# Patient Record
Sex: FEMALE | Employment: OTHER | URBAN - METROPOLITAN AREA
[De-identification: names, ages, dates, MRNs, and addresses within clinical notes are randomized per-mention and may not be internally consistent; named-entity substitution may affect disease eponyms.]

---

## 2018-10-21 ENCOUNTER — HOSPITAL ENCOUNTER (INPATIENT)
Age: 78
LOS: 3 days | Discharge: NURSING, REHAB OR LONG-TERM CARE | DRG: 536 | End: 2018-10-25
Attending: EMERGENCY MEDICINE | Admitting: FAMILY MEDICINE
Payer: MEDICARE
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
## 2018-10-21 DIAGNOSIS — S72.115A NONDISPLACED FRACTURE OF GREATER TROCHANTER OF LEFT FEMUR, INITIAL ENCOUNTER FOR CLOSED FRACTURE (HCC): Primary | ICD-10-CM

## 2018-10-21 PROCEDURE — 99284 EMERGENCY DEPT VISIT MOD MDM: CPT

## 2018-10-21 PROCEDURE — 96374 THER/PROPH/DIAG INJ IV PUSH: CPT

## 2018-10-21 PROCEDURE — 96376 TX/PRO/DX INJ SAME DRUG ADON: CPT

## 2018-10-21 RX ORDER — ASPIRIN 81 MG/1
81 TABLET ORAL DAILY
COMMUNITY
End: 2021-12-03

## 2018-10-22 ENCOUNTER — APPOINTMENT (OUTPATIENT)
Dept: GENERAL RADIOLOGY | Age: 78
DRG: 536 | End: 2018-10-22
Attending: FAMILY MEDICINE
Payer: MEDICARE

## 2018-10-22 ENCOUNTER — APPOINTMENT (OUTPATIENT)
Dept: GENERAL RADIOLOGY | Age: 78
DRG: 536 | End: 2018-10-22
Attending: NURSE PRACTITIONER
Payer: MEDICARE

## 2018-10-22 ENCOUNTER — APPOINTMENT (OUTPATIENT)
Dept: CT IMAGING | Age: 78
DRG: 536 | End: 2018-10-22
Attending: NURSE PRACTITIONER
Payer: MEDICARE

## 2018-10-22 PROBLEM — M25.552 ACUTE HIP PAIN, LEFT: Status: ACTIVE | Noted: 2018-10-22

## 2018-10-22 PROBLEM — S72.112A FRACTURE OF GREATER TROCHANTER OF LEFT FEMUR (HCC): Status: ACTIVE | Noted: 2018-10-22

## 2018-10-22 LAB
ABO + RH BLD: NORMAL
ALBUMIN SERPL-MCNC: 3.3 G/DL (ref 3.5–5)
ALBUMIN/GLOB SERPL: 1 {RATIO} (ref 1.1–2.2)
ALP SERPL-CCNC: 141 U/L (ref 45–117)
ALT SERPL-CCNC: 20 U/L (ref 12–78)
ANION GAP SERPL CALC-SCNC: 8 MMOL/L (ref 5–15)
APTT PPP: 26.9 SEC (ref 22.1–32)
AST SERPL-CCNC: 14 U/L (ref 15–37)
ATRIAL RATE: 96 BPM
BASOPHILS # BLD: 0.1 K/UL (ref 0–0.1)
BASOPHILS NFR BLD: 0 % (ref 0–1)
BILIRUB SERPL-MCNC: 0.3 MG/DL (ref 0.2–1)
BLOOD GROUP ANTIBODIES SERPL: NORMAL
BUN SERPL-MCNC: 17 MG/DL (ref 6–20)
BUN/CREAT SERPL: 16 (ref 12–20)
CALCIUM SERPL-MCNC: 8.5 MG/DL (ref 8.5–10.1)
CALCULATED P AXIS, ECG09: 35 DEGREES
CALCULATED R AXIS, ECG10: 26 DEGREES
CALCULATED T AXIS, ECG11: 34 DEGREES
CHLORIDE SERPL-SCNC: 109 MMOL/L (ref 97–108)
CK MB CFR SERPL CALC: 1.8 % (ref 0–2.5)
CK MB SERPL-MCNC: 1.4 NG/ML (ref 5–25)
CK SERPL-CCNC: 76 U/L (ref 26–192)
CO2 SERPL-SCNC: 25 MMOL/L (ref 21–32)
COMMENT, HOLDF: NORMAL
CREAT SERPL-MCNC: 1.07 MG/DL (ref 0.55–1.02)
DIAGNOSIS, 93000: NORMAL
DIFFERENTIAL METHOD BLD: ABNORMAL
EOSINOPHIL # BLD: 0.4 K/UL (ref 0–0.4)
EOSINOPHIL NFR BLD: 3 % (ref 0–7)
ERYTHROCYTE [DISTWIDTH] IN BLOOD BY AUTOMATED COUNT: 12.9 % (ref 11.5–14.5)
GLOBULIN SER CALC-MCNC: 3.4 G/DL (ref 2–4)
GLUCOSE SERPL-MCNC: 102 MG/DL (ref 65–100)
HCT VFR BLD AUTO: 36.2 % (ref 35–47)
HGB BLD-MCNC: 11.9 G/DL (ref 11.5–16)
IMM GRANULOCYTES # BLD: 0.1 K/UL (ref 0–0.04)
IMM GRANULOCYTES NFR BLD AUTO: 1 % (ref 0–0.5)
INR PPP: 1 (ref 0.9–1.1)
LYMPHOCYTES # BLD: 1.9 K/UL (ref 0.8–3.5)
LYMPHOCYTES NFR BLD: 13 % (ref 12–49)
MAGNESIUM SERPL-MCNC: 2.1 MG/DL (ref 1.6–2.4)
MCH RBC QN AUTO: 30.5 PG (ref 26–34)
MCHC RBC AUTO-ENTMCNC: 32.9 G/DL (ref 30–36.5)
MCV RBC AUTO: 92.8 FL (ref 80–99)
MONOCYTES # BLD: 1 K/UL (ref 0–1)
MONOCYTES NFR BLD: 7 % (ref 5–13)
NEUTS SEG # BLD: 11.1 K/UL (ref 1.8–8)
NEUTS SEG NFR BLD: 76 % (ref 32–75)
NRBC # BLD: 0 K/UL (ref 0–0.01)
NRBC BLD-RTO: 0 PER 100 WBC
P-R INTERVAL, ECG05: 152 MS
PLATELET # BLD AUTO: 302 K/UL (ref 150–400)
PMV BLD AUTO: 10 FL (ref 8.9–12.9)
POTASSIUM SERPL-SCNC: 3.9 MMOL/L (ref 3.5–5.1)
PROT SERPL-MCNC: 6.7 G/DL (ref 6.4–8.2)
PROTHROMBIN TIME: 9.8 SEC (ref 9–11.1)
Q-T INTERVAL, ECG07: 340 MS
QRS DURATION, ECG06: 72 MS
QTC CALCULATION (BEZET), ECG08: 429 MS
RBC # BLD AUTO: 3.9 M/UL (ref 3.8–5.2)
SAMPLES BEING HELD,HOLD: NORMAL
SODIUM SERPL-SCNC: 142 MMOL/L (ref 136–145)
SPECIMEN EXP DATE BLD: NORMAL
THERAPEUTIC RANGE,PTTT: NORMAL SECS (ref 58–77)
TROPONIN I SERPL-MCNC: <0.05 NG/ML
VENTRICULAR RATE, ECG03: 96 BPM
WBC # BLD AUTO: 14.6 K/UL (ref 3.6–11)

## 2018-10-22 PROCEDURE — 84484 ASSAY OF TROPONIN QUANT: CPT | Performed by: FAMILY MEDICINE

## 2018-10-22 PROCEDURE — 85025 COMPLETE CBC W/AUTO DIFF WBC: CPT | Performed by: FAMILY MEDICINE

## 2018-10-22 PROCEDURE — 97165 OT EVAL LOW COMPLEX 30 MIN: CPT | Performed by: OCCUPATIONAL THERAPIST

## 2018-10-22 PROCEDURE — 74011250636 HC RX REV CODE- 250/636: Performed by: NURSE PRACTITIONER

## 2018-10-22 PROCEDURE — 73700 CT LOWER EXTREMITY W/O DYE: CPT

## 2018-10-22 PROCEDURE — 93005 ELECTROCARDIOGRAM TRACING: CPT

## 2018-10-22 PROCEDURE — 71045 X-RAY EXAM CHEST 1 VIEW: CPT

## 2018-10-22 PROCEDURE — 86900 BLOOD TYPING SEROLOGIC ABO: CPT | Performed by: FAMILY MEDICINE

## 2018-10-22 PROCEDURE — 77030036660

## 2018-10-22 PROCEDURE — 97116 GAIT TRAINING THERAPY: CPT

## 2018-10-22 PROCEDURE — 74011250636 HC RX REV CODE- 250/636

## 2018-10-22 PROCEDURE — 82550 ASSAY OF CK (CPK): CPT | Performed by: FAMILY MEDICINE

## 2018-10-22 PROCEDURE — 83735 ASSAY OF MAGNESIUM: CPT | Performed by: FAMILY MEDICINE

## 2018-10-22 PROCEDURE — 85730 THROMBOPLASTIN TIME PARTIAL: CPT | Performed by: FAMILY MEDICINE

## 2018-10-22 PROCEDURE — 73552 X-RAY EXAM OF FEMUR 2/>: CPT

## 2018-10-22 PROCEDURE — 74011250637 HC RX REV CODE- 250/637: Performed by: PHYSICIAN ASSISTANT

## 2018-10-22 PROCEDURE — 36415 COLL VENOUS BLD VENIPUNCTURE: CPT | Performed by: NURSE PRACTITIONER

## 2018-10-22 PROCEDURE — 80053 COMPREHEN METABOLIC PANEL: CPT | Performed by: FAMILY MEDICINE

## 2018-10-22 PROCEDURE — 85610 PROTHROMBIN TIME: CPT | Performed by: FAMILY MEDICINE

## 2018-10-22 PROCEDURE — 97535 SELF CARE MNGMENT TRAINING: CPT | Performed by: OCCUPATIONAL THERAPIST

## 2018-10-22 PROCEDURE — 74011250637 HC RX REV CODE- 250/637: Performed by: FAMILY MEDICINE

## 2018-10-22 PROCEDURE — 73502 X-RAY EXAM HIP UNI 2-3 VIEWS: CPT

## 2018-10-22 PROCEDURE — 97161 PT EVAL LOW COMPLEX 20 MIN: CPT

## 2018-10-22 PROCEDURE — 74011250636 HC RX REV CODE- 250/636: Performed by: FAMILY MEDICINE

## 2018-10-22 PROCEDURE — 73590 X-RAY EXAM OF LOWER LEG: CPT

## 2018-10-22 PROCEDURE — 65270000029 HC RM PRIVATE

## 2018-10-22 RX ORDER — FENTANYL CITRATE 50 UG/ML
25 INJECTION, SOLUTION INTRAMUSCULAR; INTRAVENOUS
Status: COMPLETED | OUTPATIENT
Start: 2018-10-22 | End: 2018-10-22

## 2018-10-22 RX ORDER — SODIUM CHLORIDE 0.9 % (FLUSH) 0.9 %
5-10 SYRINGE (ML) INJECTION EVERY 8 HOURS
Status: DISCONTINUED | OUTPATIENT
Start: 2018-10-22 | End: 2018-10-25 | Stop reason: HOSPADM

## 2018-10-22 RX ORDER — ACETAMINOPHEN 325 MG/1
650 TABLET ORAL
Status: DISCONTINUED | OUTPATIENT
Start: 2018-10-22 | End: 2018-10-25 | Stop reason: HOSPADM

## 2018-10-22 RX ORDER — PANTOPRAZOLE SODIUM 40 MG/1
40 TABLET, DELAYED RELEASE ORAL
Status: DISCONTINUED | OUTPATIENT
Start: 2018-10-22 | End: 2018-10-25 | Stop reason: HOSPADM

## 2018-10-22 RX ORDER — FENTANYL CITRATE 50 UG/ML
25 INJECTION, SOLUTION INTRAMUSCULAR; INTRAVENOUS
Status: DISCONTINUED | OUTPATIENT
Start: 2018-10-22 | End: 2018-10-25 | Stop reason: HOSPADM

## 2018-10-22 RX ORDER — HYDROCODONE BITARTRATE AND ACETAMINOPHEN 5; 325 MG/1; MG/1
1 TABLET ORAL
Status: DISCONTINUED | OUTPATIENT
Start: 2018-10-22 | End: 2018-10-25 | Stop reason: HOSPADM

## 2018-10-22 RX ORDER — SODIUM CHLORIDE 0.9 % (FLUSH) 0.9 %
5-10 SYRINGE (ML) INJECTION AS NEEDED
Status: DISCONTINUED | OUTPATIENT
Start: 2018-10-22 | End: 2018-10-25 | Stop reason: HOSPADM

## 2018-10-22 RX ORDER — FENTANYL CITRATE 50 UG/ML
INJECTION, SOLUTION INTRAMUSCULAR; INTRAVENOUS
Status: DISPENSED
Start: 2018-10-22 | End: 2018-10-22

## 2018-10-22 RX ORDER — BUPROPION HYDROCHLORIDE 150 MG/1
150 TABLET, EXTENDED RELEASE ORAL DAILY
Status: DISCONTINUED | OUTPATIENT
Start: 2018-10-22 | End: 2018-10-25 | Stop reason: HOSPADM

## 2018-10-22 RX ORDER — LANSOPRAZOLE 15 MG/1
15 TABLET, ORALLY DISINTEGRATING, DELAYED RELEASE ORAL
Status: DISCONTINUED | OUTPATIENT
Start: 2018-10-22 | End: 2018-10-22 | Stop reason: CLARIF

## 2018-10-22 RX ADMIN — Medication 10 ML: at 05:10

## 2018-10-22 RX ADMIN — HYDROCODONE BITARTRATE AND ACETAMINOPHEN 1 TABLET: 5; 325 TABLET ORAL at 09:51

## 2018-10-22 RX ADMIN — FENTANYL CITRATE 25 MCG: 50 INJECTION, SOLUTION INTRAMUSCULAR; INTRAVENOUS at 01:16

## 2018-10-22 RX ADMIN — BUPROPION HYDROCHLORIDE 150 MG: 150 TABLET, EXTENDED RELEASE ORAL at 09:51

## 2018-10-22 RX ADMIN — HYDROCODONE BITARTRATE AND ACETAMINOPHEN 1 TABLET: 5; 325 TABLET ORAL at 22:22

## 2018-10-22 RX ADMIN — ACETAMINOPHEN 650 MG: 325 TABLET, FILM COATED ORAL at 07:46

## 2018-10-22 RX ADMIN — PANTOPRAZOLE SODIUM 40 MG: 40 TABLET, DELAYED RELEASE ORAL at 07:30

## 2018-10-22 RX ADMIN — HYDROCODONE BITARTRATE AND ACETAMINOPHEN 1 TABLET: 5; 325 TABLET ORAL at 18:14

## 2018-10-22 RX ADMIN — FENTANYL CITRATE 25 MCG: 50 INJECTION, SOLUTION INTRAMUSCULAR; INTRAVENOUS at 03:01

## 2018-10-22 RX ADMIN — FENTANYL CITRATE 25 MCG: 50 INJECTION, SOLUTION INTRAMUSCULAR; INTRAVENOUS at 05:10

## 2018-10-22 RX ADMIN — Medication 5 ML: at 22:00

## 2018-10-22 RX ADMIN — Medication 10 ML: at 13:15

## 2018-10-22 RX ADMIN — HYDROCODONE BITARTRATE AND ACETAMINOPHEN 1 TABLET: 5; 325 TABLET ORAL at 13:45

## 2018-10-22 NOTE — H&P
[address]  HISTORY AND PHYSICAL Brigitte Lira MR#: [id number] : 1940 ACCOUNT #: [de-identified] ADMIT DATE: 10/21/2018 CHIEF COMPLAINT:  Left hip pain. HISTORY OF PRESENT ILLNESS:  A 81-year-old white female with past medical history of GERD, hiatal hernia who presented to the emergency department with chief complaint of left hip pain status post fall. The patient reportedly was outside of the [facility] [address] [facility] [address] where she tripped and fell over a construction sign or object, falling to the ground. She subsequently had left hip pain which was severe to the point where she was unable to bear any weight. Pain remained constant, aching, 10/10, involving the left hip, aggravated with any movement or palpation, or weightbearing or touch. The pain with mostly located around the left hip and radiating towards her left buttock, left thigh region. On arrival to the emergency department, initial recorded vital signs of blood pressure 154/80, heart rate 94, respiratory rate 18, O2 saturation 98% room air. Workup included CT of the left lower extremity without contrast shows subtle nondisplaced fracture of the left greater trochanter. Orthopedic surgery service was consulted. The patient is now seen for admission to the hospitalist service. The patient was given fentanyl 25 mcg IV x2 doses for pain. She does not report hitting her head. She does not report any loss of consciousness. There are no reports of dizziness, lightheadedness, syncope, new onset numbness, paresthesias, slurred speech, facial droop, visual disturbance, headache, neck pain, back pain, chest pain, shortness of breath, cough, congestion, abdominal pain, nausea, vomiting, diarrhea, melena, dysuria, hematuria, calf pain, swelling, edema, fever, chills, or rash. PAST MEDICAL HISTORY:   
1. GERD. 2.  Hiatal hernia. 3.  Depression -- question on Wellbutrin. MEDICATIONS:  Complete medication list reviewed and noted on chart records. Taking Last Dose Start Date End Date Provider   
 aspirin delayed-release 81 mg tablet    --  -- Yesenia Noonan MD  
 Take 81 mg by mouth daily. buPROPion SR (WELLBUTRIN SR) 150 mg SR tablet    03/29/11  --  Provider, Historical  
 Take 150 mg by mouth daily. Notes:  Patient instructed to take pre-op day of surgery with sip of water per anesthesia guidelines. lansoprazole (PREVACID) 15 mg disintegrating tablet    --  --  Provider, Historical  
 Take 15 mg by mouth daily (before breakfast). Notes:  Patient instructed to take pre-op day of surgery with sip of water per anesthesia guidelines. ALLERGIES:  NO KNOWN DRUG ALLERGIES. SOCIAL HISTORY:  Patient denies smoking. Reportedly quit. Denies alcohol. No reports of illicit drugs. PAST SURGICAL HISTORY:   
1.  Lumbar laminectomy in 2009.   
2.  Appendectomy as a child. 3.  Cholecystectomy in 2012. FAMILY HISTORY:   
1. Hypertension, father. 2.  Heart disease, mother. 3.  Hypertension, mother and father. 4.  Unspecified cancer, mother. REVIEW OF SYSTEMS:  Pertinent positives as noted in the HPI. All other systems are reviewed and negative. PHYSICAL EXAMINATION:   
VITAL SIGNS:  Temperature 98.0 degrees Fahrenheit, blood pressure 143/76, heart rate of 97, respiratory rate of 14, O2 saturation 94% on room air. GENERAL:  Elderly female in no acute respiratory distress. PSYCHIATRIC:  The patient is awake, alert, and oriented x3. NEUROLOGIC:  GCS 15. Moves extremities x4. Sensation grossly intact. No slurred speech or facial droop. HEENT:  Normocephalic, atraumatic. PERRLA. EOMs intact. Sclerae are anicteric. Conjunctivae are clear. Nares are patent. Oropharynx is clear. Tongue is midline, not edematous. NECK:  Supple, without lymphadenopathy, JVD, carotid bruits, or thyromegaly. LYMPHATIC:  Negative for cervical or supraclavicular adenopathy. RESPIRATORY:  Lungs clear to auscultation bilaterally. CARDIOVASCULAR:  Heart regular rate and rhythm, normal S1, S2, without murmurs, rubs, or gallops. GASTROINTESTINAL:  Abdomen is soft, nontender, nondistended, normoactive bowel sounds. No rebound, no guarding, no rigidity, no auscultated abdominal bruits, no pulsatile or palpable abdominal mass. BACK:  No CVA tenderness, no step-off deformity. MUSCULOSKELETAL:  Tenderness on palpation of the left hip. No other acute palpable bony deformity. Negative for calf tenderness. VASCULAR:  2+ radial, 1+ dorsalis pedis pulse without cyanosis, clubbing, edema. SKIN:  Warm and dry. LABORATORY DATA:  I reviewed as follows. Sodium 142, potassium 3.9, chloride 109, CO2 of 25, BUN of 17, creatinine 1.07, glucose 102, anion gap of 8, calcium is 8.5, magnesium 2.1, GFR 50, total bilirubin 0.3, total protein 6.7, albumin is 3.3, ALT is 20, AST of 14, alkaline phosphatase 141, CK total of 76, CK-MB of 1.4, troponin I less of than 0.05, WBC of 14.6, hemoglobin 11.9, hematocrit of 36.2, platelets of 994, neutrophils 76%, INR 1.0, PT of 9.8, and PTT of 26.9. IMAGING:  CT of the left lower extremity without contrast results I reviewed and noted in the HPI. Chest x-ray, portable:  No acute process. Left femur 2-view x-ray showed no acute fracture and left hip x-rays results showed no acute fracture. Left tib/fib x-ray:  No acute fracture. Twelve-lead EKG:  Normal sinus rhythm at 96 beats a minute, which I have reviewed. IMPRESSION AND PLAN:     
1. Left greater trochanter nondisplaced closed fracture. Admit patient to orthopedic floor. Consult with orthopedic surgeon today for further evaluation and treatments. Placed on fall precautions. 2.  Status post fall. Plan as noted above. 3.  Acute left hip pain. Provide pain management and supportive cares. 4. Inability to walk due to noted fracture. Consult with physical and occupational therapy. 5.  Generalized weakness. Plan as noted above. 6.  Gastroesophageal reflux disease. Continue on home medication. 7.  Venous thromboembolism prophylaxis. Sequential compression devices to bilateral lower extremities. MD TAZ Herrera/ 
D: 10/22/2018 05:15    
T: 10/22/2018 06:07 JOB #: [id number]

## 2018-10-22 NOTE — ED TRIAGE NOTES
TRIAGE: Pt arrives with friend for GLF outside at the [facility], pt reports tripping over contruction sign, denies hitting head. Pain to Left hip, reports inability to bear weight, friends helped pt walk

## 2018-10-22 NOTE — PROGRESS NOTES
Bedside and Verbal shift change report given to CRISTAL Banegas (oncoming nurse) by Annika Carlton RN (offgoing nurse). Report included the following information SBAR, Kardex, Intake/Output, MAR and Recent Results.

## 2018-10-22 NOTE — CONSULTS
FRACTURE CONSULT NOTE    Subjective:     Date of Consultation:  2018      Rukhsana Bustamante is a 69 y.o. female who is being seen for left hip greater trochanter fracture. She was walking last night after seeing a Psych show at [address] [facility] theatre when she tripped over a sign that was propped up in the side walk. She landed on the left side of her body and immediately had pain and was unable to bear weight. Her pain was sharp in nature and located primarily over the lateral aspect of her hip. She denied radicular pain or paresthesias. Severity was 10/10 when she attempted to weight bear. Currently her pain is minimal when resting but it still does bother her when she tries to move. Patient Active Problem List    Diagnosis Date Noted    Acute hip pain, left 10/22/2018    Fracture of greater trochanter of left femur ([address] 10/22/2018     Family History   Problem Relation Age of Onset    Hypertension Mother     Heart Disease Mother     Cancer Mother     Hypertension Father       Social History     Tobacco Use    Smoking status: Former Smoker     Packs/day: 1.00     Last attempt to quit: 2011     Years since quittin.9   Substance Use Topics    Alcohol use: Not on file     Comment: 2 drinks per month     Past Medical History:   Diagnosis Date    GERD (gastroesophageal reflux disease)     Hiatal hernia     Nausea & vomiting     Unspecified adverse effect of anesthesia     prolonged sedation      Past Surgical History:   Procedure Laterality Date    HX APPENDECTOMY  child, open    HX CHOLECYSTECTOMY      HX ORTHOPAEDIC  2009    lumbar gonzalez.      Prior to Admission medications    Medication Sig Start Date End Date Taking? Authorizing Provider   aspirin delayed-release 81 mg tablet Take 81 mg by mouth daily. Yes Other, MD Yesenia   lansoprazole (PREVACID) 15 mg disintegrating tablet Take 15 mg by mouth daily (before breakfast).     Provider, Historical   buPROPion SR [facility] SR) 150 mg SR tablet Take 150 mg by mouth daily. 3/29/11   Provider, Historical     Current Facility-Administered Medications   Medication Dose Route Frequency    buPROPion SR (WELLBUTRIN SR) tablet 150 mg  150 mg Oral DAILY    sodium chloride (NS) flush 5-10 mL  5-10 mL IntraVENous Q8H    sodium chloride (NS) flush 5-10 mL  5-10 mL IntraVENous PRN    acetaminophen (TYLENOL) tablet 650 mg  650 mg Oral Q6H PRN    fentaNYL citrate (PF) injection 25 mcg  25 mcg IntraVENous Q4H PRN    pantoprazole (PROTONIX) tablet 40 mg  40 mg Oral ACB      No Known Allergies     Review of Systems:    Negative for fevers, chills, nausea, vomiting, chest pain, shortness of breath, headaches. Positive for         Objective:     Patient Vitals for the past 24 hrs:   Temp Pulse Resp BP SpO2   10/22/18 0503 98.2 °F (36.8 °C) 97 16 127/74 97 %   10/22/18 0410 97.9 °F (36.6 °C) 94 18 143/76 94 %   10/21/18 2242 98 °F (36.7 °C) 94 18 164/80 98 %       Temp (24hrs), Av °F (36.7 °C), Min:97.9 °F (36.6 °C), Max:98.2 °F (36.8 °C)      Gen: NAD, A&Ox3  Resp: Non-labored breathing  CV: Extremities well perfused  Abd: soft, NT  LLE: TTP over the greater trochanter. Skin intact with no erythema or ecchymosis. She has no TTP over the knee or ankle. No pain with passive hip flexion or log roll. Full pain free ROM of the knee and ankle. SILT over DP/SP/S/S/T distributions. RLE: no pain with ROM, no swelling about knee or ankle, skin intact, warm well perfused, SILT in al distributions L3-S1, palpable pedal pulses, no calf tenderness    Imaging Review: x rays and CT of the left hip show a non displaced greater trochanter fracture.      Labs:   Recent Results (from the past 24 hour(s))   SAMPLES BEING HELD    Collection Time: 10/22/18  2:52 AM   Result Value Ref Range    SAMPLES BEING HELD  1red, 1blue, 1green, 1lav     COMMENT        Add-on orders for these samples will be processed based on acceptable specimen integrity and analyte stability, which may vary by analyte. CBC WITH AUTOMATED DIFF    Collection Time: 10/22/18  2:52 AM   Result Value Ref Range    WBC 14.6 (H) 3.6 - 11.0 K/uL    RBC 3.90 3.80 - 5.20 M/uL    HGB 11.9 11.5 - 16.0 g/dL    HCT 36.2 35.0 - 47.0 %    MCV 92.8 80.0 - 99.0 FL    MCH 30.5 26.0 - 34.0 PG    MCHC 32.9 30.0 - 36.5 g/dL    RDW 12.9 11.5 - 14.5 %    PLATELET 054 678 - 054 K/uL    MPV 10.0 8.9 - 12.9 FL    NRBC 0.0 0  WBC    ABSOLUTE NRBC 0.00 0.00 - 0.01 K/uL    NEUTROPHILS 76 (H) 32 - 75 %    LYMPHOCYTES 13 12 - 49 %    MONOCYTES 7 5 - 13 %    EOSINOPHILS 3 0 - 7 %    BASOPHILS 0 0 - 1 %    IMMATURE GRANULOCYTES 1 (H) 0.0 - 0.5 %    ABS. NEUTROPHILS 11.1 (H) 1.8 - 8.0 K/UL    ABS. LYMPHOCYTES 1.9 0.8 - 3.5 K/UL    ABS. MONOCYTES 1.0 0.0 - 1.0 K/UL    ABS. EOSINOPHILS 0.4 0.0 - 0.4 K/UL    ABS. BASOPHILS 0.1 0.0 - 0.1 K/UL    ABS. IMM. GRANS. 0.1 (H) 0.00 - 0.04 K/UL    DF AUTOMATED     CK W/ CKMB & INDEX    Collection Time: 10/22/18  2:52 AM   Result Value Ref Range    CK 76 26 - 192 U/L    CK - MB 1.4 <3.6 NG/ML    CK-MB Index 1.8 0 - 2.5     MAGNESIUM    Collection Time: 10/22/18  2:52 AM   Result Value Ref Range    Magnesium 2.1 1.6 - 2.4 mg/dL   METABOLIC PANEL, COMPREHENSIVE    Collection Time: 10/22/18  2:52 AM   Result Value Ref Range    Sodium 142 136 - 145 mmol/L    Potassium 3.9 3.5 - 5.1 mmol/L    Chloride 109 (H) 97 - 108 mmol/L    CO2 25 21 - 32 mmol/L    Anion gap 8 5 - 15 mmol/L    Glucose 102 (H) 65 - 100 mg/dL    BUN 17 6 - 20 MG/DL    Creatinine 1.07 (H) 0.55 - 1.02 MG/DL    BUN/Creatinine ratio 16 12 - 20      GFR est AA >60 >60 ml/min/1.73m2    GFR est non-AA 50 (L) >60 ml/min/1.73m2    Calcium 8.5 8.5 - 10.1 MG/DL    Bilirubin, total 0.3 0.2 - 1.0 MG/DL    ALT (SGPT) 20 12 - 78 U/L    AST (SGOT) 14 (L) 15 - 37 U/L    Alk.  phosphatase 141 (H) 45 - 117 U/L    Protein, total 6.7 6.4 - 8.2 g/dL    Albumin 3.3 (L) 3.5 - 5.0 g/dL    Globulin 3.4 2.0 - 4.0 g/dL    A-G Ratio 1.0 (L) 1.1 - 2.2     PROTHROMBIN TIME + INR    Collection Time: 10/22/18  2:52 AM   Result Value Ref Range    INR 1.0 0.9 - 1.1      Prothrombin time 9.8 9.0 - 11.1 sec   PTT    Collection Time: 10/22/18  2:52 AM   Result Value Ref Range    aPTT 26.9 22.1 - 32.0 sec    aPTT, therapeutic range     58.0 - 77.0 SECS   TROPONIN I    Collection Time: 10/22/18  2:52 AM   Result Value Ref Range    Troponin-I, Qt. <0.05 <0.05 ng/mL   TYPE & SCREEN    Collection Time: 10/22/18  2:52 AM   Result Value Ref Range    Crossmatch Expiration 10/25/2018     ABO/Rh(D) Tam Guzman NEGATIVE     Antibody screen NEG    EKG, 12 LEAD, INITIAL    Collection Time: 10/22/18  3:41 AM   Result Value Ref Range    Ventricular Rate 96 BPM    Atrial Rate 96 BPM    P-R Interval 152 ms    QRS Duration 72 ms    Q-T Interval 340 ms    QTC Calculation (Bezet) 429 ms    Calculated P Axis 35 degrees    Calculated R Axis 26 degrees    Calculated T Axis 34 degrees    Diagnosis Normal sinus rhythm  No previous ECGs available            Current Facility-Administered Medications   Medication Dose Route Frequency    buPROPion SR (WELLBUTRIN SR) tablet 150 mg  150 mg Oral DAILY    sodium chloride (NS) flush 5-10 mL  5-10 mL IntraVENous Q8H    sodium chloride (NS) flush 5-10 mL  5-10 mL IntraVENous PRN    acetaminophen (TYLENOL) tablet 650 mg  650 mg Oral Q6H PRN    fentaNYL citrate (PF) injection 25 mcg  25 mcg IntraVENous Q4H PRN    pantoprazole (PROTONIX) tablet 40 mg  40 mg Oral ACB         Impression:     Principal Problem:    Fracture of greater trochanter of left femur (HCC) (10/22/2018)    Active Problems:    Acute hip pain, left (10/22/2018)        Plan:   We discussed that this fracture is amenable to conservative management. At this time we will allow her to mobilize with protective weight bearing on the left side. She should mobilize with PT. No active abduction of the hip. She can follow up with Dr. Yaritza Bernal in the office in 2 weeks.          Luiza Reece MD

## 2018-10-22 NOTE — PROGRESS NOTES
Hospitalist Progress Note Lola Sellers MD 
Office: [phone] Date of Service:  10/22/2018 NAME:  Rukhsana Bustamante :  1940 MRN:  [id number] Pt seen and examined discussed care plan Conservative MGMT per ortho not operable Hospital Problems  Date Reviewed: 10/22/2018 Codes Class Noted POA Acute hip pain, left ICD-10-CM: M25.552 ICD-9-CM: 719.45  10/22/2018 Unknown * (Principal) Fracture of greater trochanter of left femur ([address] ICD-10-CM: F14.492Z ICD-9-CM: 820.20  10/22/2018 Unknown Review of Systems: A comprehensive review of systems was negative. Vital Signs:  
 Last 24hrs VS reviewed since prior progress note. Most recent are: 
Visit Vitals /71 (BP 1 Location: Right arm, BP Patient Position: At rest) Pulse (!) 102 Temp 98.1 °F (36.7 °C) Resp 16 SpO2 92% Physical Examination:  
 
 
     
   
   
Resp:  CTA bilaterally. No wheezing/rhonchi/rales. No accessory muscle use CV:  Regular rhythm, normal rate, no murmurs, gallops, rubs GI:  Soft, non distended, non tender. normoactive bowel sounds, no hepatosplenomegaly Musculoskeletal:  No edema, Neurologic:  alert and awake PLAN:  
 
1. Supportive care , conservative MGMT 2. CM - need rehab  
______________________________________________________________________ EXPECTED LENGTH OF STAY: - - - 
ACTUAL LENGTH OF STAY:          0 Lola Sellers MD

## 2018-10-22 NOTE — PROGRESS NOTES
Problem: Mobility Impaired (Adult and Pediatric) Goal: *Acute Goals and Plan of Care (Insert Text) Physical Therapy Goals Initiated 10/22/2018 1. Patient will move from supine to sit and sit to supine  and roll side to side in bed with modified independence within 7 day(s). 2.  Patient will transfer from bed to chair and chair to bed with modified independence using the least restrictive device within 7 day(s). 3.  Patient will perform sit to stand with modified independence within 7 day(s). 4.  Patient will ambulate with modified independence for 150 feet with the least restrictive device within 7 day(s). 5.  Patient will ascend/descend 4 stairs with 1 handrail(s) with modified independence within 7 day(s). physical Therapy EVALUATION Patient: Jennyfer Smith (29 y.o. female) Date: 10/22/2018 Primary Diagnosis: Fracture of greater trochanter of left femur (HCC) Acute hip pain, left Precautions:   Fall, PWB(LLE, no active L hip abduction) ASSESSMENT : 
Based on the objective data described below, the patient presents with decreased mobility after GLF with L greater trochanter fracture. It is being managed non-operatively with limiting weight bearing and no abduction of the LLE. Prior to admission, patient was independent with mobility without an assistive device, but reports that she \"drags\" her R foot due to \"arthritis. \"  She has had a spine surgery in the distant past and note weakness in the R foot DF. Her gait was slow with the walker and she did need cues to maintain limited weight bearing on the LLE. Although her VS were initially stable with position change, she then became hypotensive and lightheaded and was returned to supine. Her VS recovered to baseline quickly as did her symptoms. Patient tends to move somewhat impulsively but has a supportive daughter and son-in-law with whom she will be staying on discharge.   Note that patient reports that her  passed away only 3 weeks ago. Educated patient and family about limiting her activity to short bouts of walking until she is cleared for increased weight bearing. She will need a walker for home use and one will be ordered today. Expect that she will progress well as her pain and VS are more stable and that she will be able to return home with family support in the next several days. Patient will benefit from skilled intervention to address the above impairments. Patients rehabilitation potential is considered to be Good Factors which may influence rehabilitation potential include:  
[]         None noted 
[]         Mental ability/status [x]         Medical condition 
[]         Home/family situation and support systems 
[x]         Safety awareness [x]         Pain tolerance/management 
[]         Other: PLAN : 
Recommendations and Planned Interventions: 
[x]           Bed Mobility Training             []    Neuromuscular Re-Education 
[x]           Transfer Training                   []    Orthotic/Prosthetic Training 
[x]           Gait Training                         []    Modalities [x]           Therapeutic Exercises           []    Edema Management/Control 
[x]           Therapeutic Activities            [x]    Patient and Family Training/Education 
[]           Other (comment): Frequency/Duration: Patient will be followed by physical therapy  daily to address goals. Discharge Recommendations: Home Health Further Equipment Recommendations for Discharge: rolling walker SUBJECTIVE:  
Patient stated I don't know how well I am going to be able to do.  OBJECTIVE DATA SUMMARY:  
HISTORY:   
Past Medical History:  
Diagnosis Date  GERD (gastroesophageal reflux disease)  Hiatal hernia  Nausea & vomiting  Unspecified adverse effect of anesthesia 9-2009  
 prolonged sedation Past Surgical History:  
Procedure Laterality Date  HX APPENDECTOMY  child, open  HX CHOLECYSTECTOMY  2012  HX ORTHOPAEDIC  2009  
 lumbar gonzalez.  
 
Prior Level of Function/Home Situation: independent, lives alone but full bath is upstairs, so plan is for D/C to daughter's home Personal factors and/or comorbidities impacting plan of care: L hip pain and fracture, restricted weight bearing, impulsive Home Situation Home Environment: Private residence # Steps to Enter: 4 Rails to Enter: Yes One/Two Story Residence: Two story(will stay with daughter at discharge) Living Alone: Yes Support Systems: Child(mayra), Family member(s) Patient Expects to be Discharged to[de-identified] Private residence Current DME Used/Available at Home: Grab bars Tub or Shower Type: Shower EXAMINATION/PRESENTATION/DECISION MAKING: Critical Behavior: 
Neurologic State: Alert, Appropriate for age Orientation Level: Oriented X4 Cognition: Appropriate for age attention/concentration, Follows commands Safety/Judgement: Awareness of environment, Fall prevention, Driving appropriateness, Good awareness of safety precautions, Insight into deficits, Home safety Hearing: Auditory Auditory Impairment: NoneSkin:  intact Edema: none noted Range Of Motion: 
AROM: Generally decreased, functional 
  
  
  
PROM: Within functional limits Strength:   
Strength: Generally decreased, functional 
  
  
  
  
  
  
Tone & Sensation:  
Tone: Normal 
  
  
  
  
Sensation: Intact Coordination: 
Coordination: Within functional limits Vision:  
Acuity: Impaired near vision Corrective Lenses: Glasses Functional Mobility: 
Bed Mobility: 
  
Supine to Sit: Supervision Sit to Supine: Minimum assistance; Additional time(A for no L hip abd) Transfers: 
Sit to Stand: Minimum assistance; Additional time;Assist x1(for safety) Stand to Sit: Minimum assistance; Additional time;Assist x1(for safety) Balance:  
Sitting: Intact Standing: Intact; With support(RW and LLE PWB)Ambulation/Gait Training:Distance (ft): 30 Feet (ft) Assistive Device: Gait belt;Walker, rolling Ambulation - Level of Assistance: Minimal assistance; Adaptive equipment; Additional time Gait Abnormalities: Antalgic;Decreased step clearance; Step to gait Right Side Weight Bearing: Full Left Side Weight Bearing: Partial (%) Base of Support: Shift to left Stance: Left decreased Speed/Anusha: Pace decreased (<100 feet/min) Step Length: Left shortened;Right shortened Swing Pattern: Left asymmetrical 
  
Interventions: Safety awareness training; Tactile cues; Verbal cues; Visual/Demos Stairs: Therapeutic Exercises:  
 
 
Functional Measure: 
Barthel Index: 
 
Bathin Bladder: 10 Bowels: 10 
Groomin Dressin Feeding: 10 Mobility: 0 Stairs: 0 Toilet Use: 5 Transfer (Bed to Chair and Back): 10 Total: 55 Barthel and G-code impairment scale: 
Percentage of impairment CH 
0% CI 
1-19% CJ 
20-39% CK 
40-59% CL 
60-79% CM 
80-99% CN 
100% Barthel Score 0-100 100 99-80 79-60 59-40 20-39 1-19 
 0 Barthel Score 0-20 20 17-19 13-16 9-12 5-8 1-4 0 The Barthel ADL Index: Guidelines 1. The index should be used as a record of what a patient does, not as a record of what a patient could do. 2. The main aim is to establish degree of independence from any help, physical or verbal, however minor and for whatever reason. 3. The need for supervision renders the patient not independent. 4. A patient's performance should be established using the best available evidence. Asking the patient, friends/relatives and nurses are the usual sources, but direct observation and common sense are also important. However direct testing is not needed. 5. Usually the patient's performance over the preceding 24-48 hours is important, but occasionally longer periods will be relevant. 6. Middle categories imply that the patient supplies over 50 per cent of the effort. 7. Use of aids to be independent is allowed. Staci Duran., Barthel, D.W. (0314). Functional evaluation: the Barthel Index. [address] (14)2. DENNIS Weber, Arleen Rizzo., [facility]., [place], [address] (1999). Measuring the change indisability after inpatient rehabilitation; comparison of the responsiveness of the Barthel Index and Functional [place] Measure. Journal of Neurology, Neurosurgery, and Psychiatry, 66(4), 089-226. IVELISSE VazquezA, ARIANA Anderson, & Louie Chavez M.A. (2004.) Assessment of post-stroke quality of life in cost-effectiveness studies: The usefulness of the Barthel Index and the EuroQoL-5D. [facility], 13, 216-20 G codes: In compliance with CMSs Claims Based Outcome Reporting, the following G-code set was chosen for this patient based on their primary functional limitation being treated: The outcome measure chosen to determine the severity of the functional limitation was the Barthel with a score of 55/100 which was correlated with the impairment scale. ? Mobility - Walking and Moving Around:  
  - CURRENT STATUS: CK - 40%-59% impaired, limited or restricted  - GOAL STATUS: CI - 1%-19% impaired, limited or restricted  - D/C STATUS:  ---------------To be determined--------------- Physical Therapy Evaluation Charge Determination History Examination Presentation Decision-Making HIGH Complexity :3+ comorbidities / personal factors will impact the outcome/ POC  MEDIUM Complexity : 3 Standardized tests and measures addressing body structure, function, activity limitation and / or participation in recreation  MEDIUM Complexity : Evolving with changing characteristics  LOW Complexity : FOTO score of  Based on the above components, the patient evaluation is determined to be of the following complexity level: LOW Pain: 
Pain Scale 1: Visual 
Pain Intensity 1: 6 Pain Location 1: Hip Pain Orientation 1: Left Pain Description 1: Aching Pain Intervention(s) 1: Medication (see MAR) Activity Tolerance:  
Fair Please refer to the flowsheet for vital signs taken during this treatment. After treatment:  
[]         Patient left in no apparent distress sitting up in chair 
[x]         Patient left in no apparent distress in bed, ice in place [x]         Call bell left within reach [x]         Nursing notified 
[x]         Caregiver present 
[]         Bed alarm activated COMMUNICATION/EDUCATION:  
The patients plan of care was discussed with: Occupational Therapist and Registered Nurse. [x]         Fall prevention education was provided and the patient/caregiver indicated understanding. [x]         Patient/family have participated as able in goal setting and plan of care. [x]         Patient/family agree to work toward stated goals and plan of care. []         Patient understands intent and goals of therapy, but is neutral about his/her participation. []         Patient is unable to participate in goal setting and plan of care. Thank you for this referral. 
Aissatou Briceno, PT Time Calculation: 30 mins

## 2018-10-22 NOTE — ED PROVIDER NOTES
69 y.o. female with past medical history significant for GERD, hiatal hernia, lumbar lamenectomy who presents from home accompanied by family with chief complaint of fall. Patient reports that she fell around 2200 tonight. She was leaving the theater and tripped over a construction sign. She landed on her left hip and leg. Denies hitting her head. Denies LOC. Since then, has been unable to ambulate. Currently rates her pain a 7/10, has not had anything for pain PTA. Her last tdap was in 2017. Denies any previous injuries or surgeries to the hips. Notes that she has chronic right hip pain that her primary care has been treated for arthritis. Denies fever, vision changes, numbness, syncope, dizziness, back pain, abdominal pain, vomiting/diarrhea, urinary symptoms. There are no other acute medical concerns at this time. Social hx: former smoker, EtOH on occasion PCP: Margarita Huang MD 
 
 
 
 
The history is provided by the patient. Fall Pertinent negatives include no fever, no numbness, no abdominal pain and no vomiting. Past Medical History:  
Diagnosis Date  GERD (gastroesophageal reflux disease)  Hiatal hernia  Nausea & vomiting  Unspecified adverse effect of anesthesia 9-2009  
 prolonged sedation Past Surgical History:  
Procedure Laterality Date  HX APPENDECTOMY  child, open  HX CHOLECYSTECTOMY  2012  HX ORTHOPAEDIC  2009  
 lumbar gonzalez.  
 
   
Family History:  
Problem Relation Age of Onset  Hypertension Mother  Heart Disease Mother  Cancer Mother  Hypertension Father Social History Socioeconomic History  Marital status:  Spouse name: Not on file  Number of children: Not on file  Years of education: Not on file  Highest education level: Not on file Social Needs  Financial resource strain: Not on file  Food insecurity - worry: Not on file  Food insecurity - inability: Not on file  Transportation needs - medical: Not on file  Transportation needs - non-medical: Not on file Occupational History  Not on file Tobacco Use  Smoking status: Former Smoker Packs/day: 1.00 Last attempt to quit: 2011 Years since quittin.9 Substance and Sexual Activity  Alcohol use: Not on file Comment: 2 drinks per month  Drug use: Not on file  Sexual activity: Not on file Other Topics Concern  Not on file Social History Narrative  Not on file ALLERGIES: Patient has no known allergies. Review of Systems Constitutional: Negative for fever. HENT: Negative for facial swelling. Eyes: Negative for visual disturbance. Respiratory: Negative for shortness of breath. Cardiovascular: Negative for chest pain. Gastrointestinal: Negative for abdominal pain, diarrhea and vomiting. Genitourinary: Negative for difficulty urinating and dysuria. Musculoskeletal: Positive for arthralgias, back pain and gait problem. Skin: Positive for wound (abrasion to left knee). Neurological: Negative for dizziness, syncope and numbness. Psychiatric/Behavioral: Negative for confusion and decreased concentration. All other systems reviewed and are negative. Vitals:  
 10/21/18 2242 BP: 164/80 Pulse: 94 Resp: 18 Temp: 98 °F (36.7 °C) SpO2: 98% Physical Exam  
Constitutional: She is oriented to person, place, and time. She appears well-developed and well-nourished. No distress. HENT:  
Head: Normocephalic and atraumatic. Head is without raccoon's eyes and without Stone's sign. Right Ear: Tympanic membrane and external ear normal. No hemotympanum. Left Ear: Tympanic membrane and external ear normal. No hemotympanum. Mouth/Throat: No trismus in the jaw. Eyes: Conjunctivae and EOM are normal. Pupils are equal, round, and reactive to light. Neck: Normal range of motion. Neck supple.  No spinous process tenderness present. No neck rigidity. No edema, no erythema and normal range of motion present. Cardiovascular: Normal rate, regular rhythm, normal heart sounds and intact distal pulses. No murmur heard. Pulmonary/Chest: Effort normal and breath sounds normal. No respiratory distress. She has no wheezes. She has no rales. Abdominal: Soft. Bowel sounds are normal. She exhibits no distension and no mass. There is no tenderness. Musculoskeletal: She exhibits no edema or deformity. Left hip: She exhibits decreased range of motion and bony tenderness. Left knee: She exhibits normal range of motion, no swelling, no effusion, no ecchymosis, no deformity, no erythema and normal patellar mobility. Left ankle: Normal.  
     Left lower leg: She exhibits no tenderness and no bony tenderness. Left foot: Normal.  
Left hip: pain with abduction of the left hip. Sensation intact distally. Brisk capillary refill. 2+ DP and PT pulses. No Tib/fib tenderness but small area of bruising noted over anterior lower leg. Dime sized abrasion noted to the left knee. No TLS spine tenderness. Neurological: She is alert and oriented to person, place, and time. Coordination normal.  
CN II-XII intact. 5/5 strength upper and lower extremities. Sensation intact Skin: Skin is warm and dry. Capillary refill takes less than 2 seconds. No rash noted. She is not diaphoretic. Psychiatric: She has a normal mood and affect. Her behavior is normal. Judgment and thought content normal.  
Nursing note and vitals reviewed. MDM Number of Diagnoses or Management Options Nondisplaced fracture of greater trochanter of left femur, initial encounter for closed fracture [facility]):  
Diagnosis management comments: Patient is a very well appearing 70 yo female who presents to the ER for left hip and leg pain after falling onto the sidewalk PTA.  Fall was not preceded by chest pain or shortness of breath. She is neurologically intact and is in no acute distress. No midline neck or TLS spine tenderness. XR left hip with pelvis, left femur, and left tib/fib unremarkable. Patient has been unable to ambulate since the fall CT left lower extremity ordered which revealed a subtle nondisplaced fracture of the left greater trochanter Consulted ortho and hospitalist for admission. Patient in agreement with this plan Attending Wendy Dawn MD examined the patient and is in agreement with the plan of care Amount and/or Complexity of Data Reviewed Discuss the patient with other providers: yes (Attending Wendy Dawn MD) Procedures 2:22 AM 
Consult: spoke with ortho (Dr. Janette Sung) via [employer] who recommended admission for pain control and PT. Recommended touch down weight bearing and no abduction of the hip. They will see the patient in the morning to further evaluate Michael Germain NP 
 
2:40 AM 
Consult: spoke with hospitalist Krystal Fraser MD via [employer] to admit the patient.  Patient in agreement to stay in the hospital   
Michael Germain NP

## 2018-10-22 NOTE — PROGRESS NOTES
Care Management Interventions PCP Verified by CM: Yes(Dr. Corrine Rubinstein) Palliative Care Criteria Met (RRAT>21 & CHF Dx)?: No 
Mode of Transport at Discharge: Other (see comment)(family) Transition of Care Consult (CM Consult): Home Health, Discharge Planning [address] Yes MyChart Signup: No 
Discharge Durable Medical Equipment: Yes(PT ordering RW) Health Maintenance Reviewed: Yes Physical Therapy Consult: Yes Occupational Therapy Consult: Yes Speech Therapy Consult: No 
Current Support Network: Own Home, Lives Alone, Family Lives Nearby(patient will be staying at her daughter's house.) Confirm Follow Up Transport: Family Plan discussed with Pt/Family/Caregiver: Yes Freedom of Choice Offered: Yes [employer] Information Provided?: No 
Discharge Location Discharge Placement: Home with home health Reason for Admission:  Fracture of greater trochanter of left femur RRAT Score:     5 Plan for utilizing home health:  [facility]. Referral sent. Orders pending. CM left FYI for MD requesting orders. Likelihood of Readmission:  low Transition of Care Plan:    Home with home health. Patient will stay at daughter's house post discharge. Daughter's address is [address]. [address] [place]/[place]

## 2018-10-22 NOTE — ED NOTES
Patient voided in bed luciano. 0400-Dr. Lyons at bedside. 0415-EKG given to Dr. Lani Lester.  2nd sample sent to blood bank per policy.

## 2018-10-22 NOTE — PROGRESS NOTES
Problem: Self Care Deficits Care Plan (Adult) Goal: *Acute Goals and Plan of Care (Insert Text) Occupational Therapy Goals Initiated 10/22/2018 1. Patient will perform lower body dressing using adaptive equipment as needed at supervision/set-up level within 7 days. 2. Patient will perform toilet transfers at supervision/set-up level using Walkers, Type: Rolling Walker and LLE PWB  within 7 days. 3. Patient will perform all aspects of toileting at supervision/set-up level within 7 days. 4. Patient will demonstrate her L hip precautions of PWB and no active L hip abduction without cues within 7 days. Occupational Therapy EVALUATION Patient: Marcus Andrews (85 y.o. female) Date: 10/22/2018 Primary Diagnosis: Fracture of greater trochanter of left femur (HCC) Acute hip pain, left Precautions:  Fall, PWB(LLE, no active L hip abduction) ASSESSMENT : 
Based on the objective data described below, the patient presents with c/o 5/10 L hip pain, decreased endurance, mobility and safety following fall last night after leaving a show [place] [address] with resulting non-operative L femur fx. She is LLE PWB and no active abduction. She currently requires up to min A for LE ADLs, toileting and functional mobility using RW to amb and following the above precautions. Pt lives alone as her  passed away 3 weeks ago and will stay with her daughter who is a RN at discharge. Recommend HH therapy at discharge. Patient will benefit from skilled intervention to address the above impairments. Patients rehabilitation potential is considered to be Good Factors which may influence rehabilitation potential include:  
[]                None noted []                Mental ability/status []                Medical condition []                Home/family situation and support systems [x]                Safety awareness [x]                Pain tolerance/management 
[]                Other: PLAN : 
 Recommendations and Planned Interventions: 
[x]                  Self Care Training                  [x]           Therapeutic Activities [x]                  Functional Mobility Training    []           Cognitive Retraining 
[x]                  Therapeutic Exercises           [x]           Endurance Activities [x]                  Balance Training                   []           Neuromuscular Re-Education []                  Visual/Perceptual Training     [x]      Home Safety Training 
[x]                  Patient Education                 [x]           Family Training/Education []                  Other (comment): Frequency/Duration: Patient will be followed by occupational therapy 5 times a week to address goals. Discharge Recommendations: Home Health Further Equipment Recommendations for Discharge: rolling walker, possible BSC SUBJECTIVE:  
Patient stated I feel ok, but know I can't walk.  The patient stated 1/2 hip precautions. Reviewed all 2 with patient. OBJECTIVE DATA SUMMARY:  
HISTORY:  
Past Medical History:  
Diagnosis Date  GERD (gastroesophageal reflux disease)  Hiatal hernia  Nausea & vomiting  Unspecified adverse effect of anesthesia 9-2009  
 prolonged sedation Past Surgical History:  
Procedure Laterality Date  HX APPENDECTOMY  child, open  HX CHOLECYSTECTOMY  2012  HX ORTHOPAEDIC  2009  
 lumbar gonzalez.  
 
 
Prior Level of Function/Environment/Context: Independent, active, drives, her  passed away 3 weeks ago Home Situation Home Environment: Private residence # Steps to Enter: 4 Rails to Enter: Yes One/Two Story Residence: Two story(will stay with daughter at discharge) Living Alone: Yes Support Systems: Child(mayra), Family member(s) Patient Expects to be Discharged to[de-identified] Private residence Current DME Used/Available at Home: Grab bars Tub or Shower Type: Shower Hand dominance: RightEXAMINATION OF PERFORMANCE DEFICITS: 
 Cognitive/Behavioral Status: 
Neurologic State: Alert; Appropriate for age Orientation Level: Oriented X4 Cognition: Appropriate for age attention/concentration; Follows commands Perception: Appears intact Perseveration: No perseveration noted Safety/Judgement: Awareness of environment; Fall prevention;Driving appropriateness;Good awareness of safety precautions; Insight into deficits;Home safety Hearing: Auditory Auditory Impairment: None Vision/Perceptual:   
 
Acuity: Impaired near vision Corrective Lenses: Glasses Range of Motion: 
AROM: Generally decreased, functional 
PROM: Within functional limits Strength: 
Strength: Generally decreased, functional 
  
  
  
  
Coordination: 
Coordination: Within functional limits Fine Motor Skills-Upper: Left Intact; Right Intact Gross Motor Skills-Upper: Left Intact; Right Intact Tone & Sensation: 
Tone: Normal 
Sensation: Intact Balance: 
Sitting: Intact Standing: Intact; With support(RW and LLE PWB) Functional Mobility and Transfers for ADLs:Bed Mobility: 
Supine to Sit: Supervision Sit to Supine: Minimum assistance; Additional time(A for no L hip abd) Transfers: 
Sit to Stand: Minimum assistance; Additional time;Assist x1(for safety) Stand to Sit: Minimum assistance; Additional time;Assist x1(for safety) Toilet Transfer : Minimum assistance; Additional time;Assist x1(using BSC and RW, LLE PWB) ADL Assessment and Intervention: 
Feeding: Independent Oral Facial Hygiene/Grooming: Setup; Additional time(seated) Bathing: Minimum assistance; Additional time;Assist x1(seated- bending forward to reach feet) Upper Body Dressing: Setup Lower Body Dressing: Minimum assistance; Additional time(seated- bending forward to reach feet- A for standing) Toileting: Minimum assistance; Additional time;Assist x1(for safety with standing) ADL Intervention and task modifications: 
Cognitive Retraining Safety/Judgement: Awareness of environment; Fall prevention;Driving appropriateness;Good awareness of safety precautions; Insight into deficits;Home safety Functional Measure: 
Barthel Index: 
 
Bathin Bladder: 10 Bowels: 10 
Groomin Dressin Feeding: 10 Mobility: 0 Stairs: 0 Toilet Use: 5 Transfer (Bed to Chair and Back): 10 Total: 55 Barthel and G-code impairment scale: 
Percentage of impairment CH 
0% CI 
1-19% CJ 
20-39% CK 
40-59% CL 
60-79% CM 
80-99% CN 
100% Barthel Score 0-100 100 99-80 79-60 59-40 20-39 1-19 
 0 Barthel Score 0-20 20 17-19 13-16 9-12 5-8 1-4 0 The Barthel ADL Index: Guidelines 1. The index should be used as a record of what a patient does, not as a record of what a patient could do. 2. The main aim is to establish degree of independence from any help, physical or verbal, however minor and for whatever reason. 3. The need for supervision renders the patient not independent. 4. A patient's performance should be established using the best available evidence. Asking the patient, friends/relatives and nurses are the usual sources, but direct observation and common sense are also important. However direct testing is not needed. 5. Usually the patient's performance over the preceding 24-48 hours is important, but occasionally longer periods will be relevant. 6. Middle categories imply that the patient supplies over 50 per cent of the effort. 7. Use of aids to be independent is allowed. Berry Loja., Barthel, D.W. (5404). Functional evaluation: the Barthel Index. [address] [facility] (14)2. [place]  donaldo DENNIS Reagan, Johanna Alcaraz., Will Means., Jocelyn, [address] (). Measuring the change indisability after inpatient rehabilitation; comparison of the responsiveness of the Barthel Index and Functional [place] Measure. Journal of Neurology, Neurosurgery, and Psychiatry, 66(4), 242-477. ANNA Ozuna, ARIANA Anderson, & Starr Mesa M.A. (2004.) Assessment of post-stroke quality of life in cost-effectiveness studies: The usefulness of the Barthel Index and the EuroQoL-5D. [facility], 34, 346-77 G codes: In compliance with CMSs Claims Based Outcome Reporting, the following G-code set was chosen for this patient based on their primary functional limitation being treated: The outcome measure chosen to determine the severity of the functional limitation was the Barthel Index with a score of 55/100 which was correlated with the impairment scale. ? Self Care:  
  - CURRENT STATUS: CK - 40%-59% impaired, limited or restricted  - GOAL STATUS: CJ - 20%-39% impaired, limited or restricted  - D/C STATUS:  ---------------To be determined--------------- Occupational Therapy Evaluation Charge Determination History Examination Decision-Making LOW Complexity : Brief history review  MEDIUM Complexity : 3-5 performance deficits relating to physical, cognitive , or psychosocial skils that result in activity limitations and / or participation restrictions LOW Complexity : No comorbidities that affect functional and no verbal or physical assistance needed to complete eval tasks Based on the above components, the patient evaluation is determined to be of the following complexity level: LOW Pain: 
Pain Scale 1: Numeric (0 - 10) Pain Intensity 1: 6 Pain Location 1: Hip Pain Orientation 1: Left Pain Description 1: Aching Pain Intervention(s) 1: Medication (see MAR) Activity Tolerance:  
fair Please refer to the flowsheet for vital signs taken during this treatment. After treatment:  
[] Patient left in no apparent distress sitting up in chair 
[x] Patient left in no apparent distress in bed 
[x] Call bell left within reach [x] Nursing notified 
[x] Caregiver present 
[] Bed alarm activated COMMUNICATION/EDUCATION:  
 The patients plan of care was discussed with: Registered Nurse. [x]    Home safety education was provided and the patient/caregiver indicated understanding. [x]    Patient/family have participated as able in goal setting and plan of care. [x]    Patient/family agree to work toward stated goals and plan of care. []    Patient understands intent and goals of therapy, but is neutral about his/her participation. []    Patient is unable to participate in goal setting and plan of care. This patients plan of care is appropriate for delegation to [facility]. Thank you for this referral. 
Jaiden Loja, OT Time Calculation: 33 mins

## 2018-10-22 NOTE — ROUTINE PROCESS
TRANSFER - OUT REPORT: 
 
Verbal report given to RN (name) on Facundo Show  being transferred to [id number] (unit) for routine progression of care Report consisted of patients Situation, Background, Assessment and  
Recommendations(SBAR). Information from the following report(s) SBAR, ED Summary, [facility] and Recent Results was reviewed with the receiving nurse. Lines:  
Peripheral IV 10/22/18 Right Antecubital (Active) Site Assessment Clean, dry, & intact 10/22/2018  2:00 AM  
Phlebitis Assessment 0 10/22/2018  2:00 AM  
Infiltration Assessment 0 10/22/2018  2:00 AM  
Dressing Status Clean, dry, & intact 10/22/2018  2:00 AM  
Dressing Type Transparent 10/22/2018  2:00 AM  
Hub Color/Line Status Pink 10/22/2018  2:00 AM  
  
 
Opportunity for questions and clarification was provided.    
 
Patient transported with:

## 2018-10-22 NOTE — PROGRESS NOTES
TRANSFER - IN REPORT: 
 
Verbal report received from [facility] RN(name) on Nick Asher  being received from ED(unit) for routine progression of care Report consisted of patients Situation, Background, Assessment and  
Recommendations(SBAR). Information from the following report(s) ED Summary was reviewed with the receiving nurse. Opportunity for questions and clarification was provided. Assessment completed upon patients arrival to unit and care assumed.

## 2018-10-23 ENCOUNTER — APPOINTMENT (OUTPATIENT)
Dept: CT IMAGING | Age: 78
DRG: 536 | End: 2018-10-23
Attending: HOSPITALIST
Payer: MEDICARE

## 2018-10-23 LAB
ANION GAP SERPL CALC-SCNC: 8 MMOL/L (ref 5–15)
BASOPHILS # BLD: 0 K/UL (ref 0–0.1)
BASOPHILS NFR BLD: 1 % (ref 0–1)
BUN SERPL-MCNC: 17 MG/DL (ref 6–20)
BUN/CREAT SERPL: 17 (ref 12–20)
CALCIUM SERPL-MCNC: 8.8 MG/DL (ref 8.5–10.1)
CHLORIDE SERPL-SCNC: 107 MMOL/L (ref 97–108)
CO2 SERPL-SCNC: 24 MMOL/L (ref 21–32)
CREAT SERPL-MCNC: 1.01 MG/DL (ref 0.55–1.02)
DIFFERENTIAL METHOD BLD: ABNORMAL
EOSINOPHIL # BLD: 0.6 K/UL (ref 0–0.4)
EOSINOPHIL NFR BLD: 7 % (ref 0–7)
ERYTHROCYTE [DISTWIDTH] IN BLOOD BY AUTOMATED COUNT: 13 % (ref 11.5–14.5)
GLUCOSE SERPL-MCNC: 100 MG/DL (ref 65–100)
HCT VFR BLD AUTO: 35.9 % (ref 35–47)
HGB BLD-MCNC: 11.6 G/DL (ref 11.5–16)
IMM GRANULOCYTES # BLD: 0 K/UL (ref 0–0.04)
IMM GRANULOCYTES NFR BLD AUTO: 0 % (ref 0–0.5)
LYMPHOCYTES # BLD: 2.1 K/UL (ref 0.8–3.5)
LYMPHOCYTES NFR BLD: 26 % (ref 12–49)
MCH RBC QN AUTO: 30 PG (ref 26–34)
MCHC RBC AUTO-ENTMCNC: 32.3 G/DL (ref 30–36.5)
MCV RBC AUTO: 92.8 FL (ref 80–99)
MONOCYTES # BLD: 0.8 K/UL (ref 0–1)
MONOCYTES NFR BLD: 9 % (ref 5–13)
NEUTS SEG # BLD: 4.6 K/UL (ref 1.8–8)
NEUTS SEG NFR BLD: 58 % (ref 32–75)
NRBC # BLD: 0 K/UL (ref 0–0.01)
NRBC BLD-RTO: 0 PER 100 WBC
PLATELET # BLD AUTO: 264 K/UL (ref 150–400)
PMV BLD AUTO: 9.5 FL (ref 8.9–12.9)
POTASSIUM SERPL-SCNC: 4.1 MMOL/L (ref 3.5–5.1)
RBC # BLD AUTO: 3.87 M/UL (ref 3.8–5.2)
SODIUM SERPL-SCNC: 139 MMOL/L (ref 136–145)
WBC # BLD AUTO: 8 K/UL (ref 3.6–11)

## 2018-10-23 PROCEDURE — 80048 BASIC METABOLIC PNL TOTAL CA: CPT | Performed by: FAMILY MEDICINE

## 2018-10-23 PROCEDURE — 36415 COLL VENOUS BLD VENIPUNCTURE: CPT | Performed by: FAMILY MEDICINE

## 2018-10-23 PROCEDURE — 65270000029 HC RM PRIVATE

## 2018-10-23 PROCEDURE — 85025 COMPLETE CBC W/AUTO DIFF WBC: CPT | Performed by: FAMILY MEDICINE

## 2018-10-23 PROCEDURE — 74011250637 HC RX REV CODE- 250/637: Performed by: HOSPITALIST

## 2018-10-23 PROCEDURE — 74011250637 HC RX REV CODE- 250/637: Performed by: FAMILY MEDICINE

## 2018-10-23 PROCEDURE — 74011250637 HC RX REV CODE- 250/637: Performed by: PHYSICIAN ASSISTANT

## 2018-10-23 PROCEDURE — 70450 CT HEAD/BRAIN W/O DYE: CPT

## 2018-10-23 PROCEDURE — 77030027138 HC INCENT SPIROMETER -A

## 2018-10-23 PROCEDURE — 97116 GAIT TRAINING THERAPY: CPT

## 2018-10-23 PROCEDURE — 93306 TTE W/DOPPLER COMPLETE: CPT

## 2018-10-23 PROCEDURE — 97530 THERAPEUTIC ACTIVITIES: CPT

## 2018-10-23 RX ORDER — POLYETHYLENE GLYCOL 3350 17 G/17G
17 POWDER, FOR SOLUTION ORAL DAILY
Status: DISCONTINUED | OUTPATIENT
Start: 2018-10-24 | End: 2018-10-25 | Stop reason: HOSPADM

## 2018-10-23 RX ORDER — ASPIRIN 81 MG/1
81 TABLET ORAL DAILY
Status: DISCONTINUED | OUTPATIENT
Start: 2018-10-24 | End: 2018-10-25 | Stop reason: HOSPADM

## 2018-10-23 RX ORDER — DOCUSATE SODIUM 100 MG/1
100 CAPSULE, LIQUID FILLED ORAL EVERY 12 HOURS
Status: DISCONTINUED | OUTPATIENT
Start: 2018-10-23 | End: 2018-10-25 | Stop reason: HOSPADM

## 2018-10-23 RX ADMIN — HYDROCODONE BITARTRATE AND ACETAMINOPHEN 1 TABLET: 5; 325 TABLET ORAL at 09:45

## 2018-10-23 RX ADMIN — BUPROPION HYDROCHLORIDE 150 MG: 150 TABLET, EXTENDED RELEASE ORAL at 09:45

## 2018-10-23 RX ADMIN — HYDROCODONE BITARTRATE AND ACETAMINOPHEN 1 TABLET: 5; 325 TABLET ORAL at 05:38

## 2018-10-23 RX ADMIN — PANTOPRAZOLE SODIUM 40 MG: 40 TABLET, DELAYED RELEASE ORAL at 07:11

## 2018-10-23 RX ADMIN — Medication 10 ML: at 05:10

## 2018-10-23 RX ADMIN — DOCUSATE SODIUM 100 MG: 100 CAPSULE, LIQUID FILLED ORAL at 15:51

## 2018-10-23 RX ADMIN — HYDROCODONE BITARTRATE AND ACETAMINOPHEN 1 TABLET: 5; 325 TABLET ORAL at 18:21

## 2018-10-23 RX ADMIN — HYDROCODONE BITARTRATE AND ACETAMINOPHEN 1 TABLET: 5; 325 TABLET ORAL at 13:46

## 2018-10-23 RX ADMIN — HYDROCODONE BITARTRATE AND ACETAMINOPHEN 1 TABLET: 5; 325 TABLET ORAL at 22:25

## 2018-10-23 NOTE — PROGRESS NOTES
ORTH FRACTURE PROGRESS NOTE 2018 Admit Date:  
10/21/2018 Post Op day: * No surgery found * Subjective:   
Earnest Angel states that she is feeling relatively well. Pain still an issue with movement or when rolling over. States she felt good mobilizing (PT states that she is not maintaining PWB status). No new complaints. Remains orthostatic. Tolerating diet Denies N/V/SOB or CP  
 
PT/OT:  
Gait:  Gait Base of Support: Shift to left Speed/Anusha: Pace decreased (<100 feet/min) Step Length: Left shortened, Right shortened Swing Pattern: Left asymmetrical 
Stance: Left decreased Gait Abnormalities: Antalgic, Decreased step clearance, Step to gait Ambulation - Level of Assistance: Minimal assistance, Adaptive equipment, Additional time Distance (ft): 30 Feet (ft) Assistive Device: Gait belt, Walker, rolling Interventions: Safety awareness training, Tactile cues, Verbal cues, Visual/Demos Interventions: Safety awareness training, Tactile cues, Verbal cues, Visual/Demos Vital Signs:   
Patient Vitals for the past 8 hrs: 
 BP Temp Pulse Resp SpO2  
10/23/18 1014 131/75  88    
10/23/18 1011 96/62  94    
10/23/18 1003 139/77  88    
10/23/18 0957 142/64  87    
10/23/18 0945 151/73  82    
10/23/18 0826 108/64 97.9 °F (36.6 °C) 81 18 93 % 10/23/18 0427 115/67 98 °F (36.7 °C) 90 20 91 % Temp (24hrs), Av.2 °F (36.8 °C), Min:97.9 °F (36.6 °C), Max:98.7 °F (37.1 °C) Pain Control:  
Pain Assessment Pain Scale 1: Numeric (0 - 10) Pain Intensity 1: 5 Pain Onset 1: with movement Pain Location 1: Leg 
Pain Orientation 1: Left Pain Description 1: Aching Pain Intervention(s) 1: Medication (see MAR) Meds: 
 
Current Facility-Administered Medications Medication Dose Route Frequency  [START ON 10/24/2018] aspirin delayed-release tablet 81 mg  81 mg Oral DAILY  buPROPion SR (WELLBUTRIN SR) tablet 150 mg  150 mg Oral DAILY  sodium chloride (NS) flush 5-10 mL  5-10 mL IntraVENous Q8H  
 sodium chloride (NS) flush 5-10 mL  5-10 mL IntraVENous PRN  
 acetaminophen (TYLENOL) tablet 650 mg  650 mg Oral Q6H PRN  
 fentaNYL citrate (PF) injection 25 mcg  25 mcg IntraVENous Q4H PRN  pantoprazole (PROTONIX) tablet 40 mg  40 mg Oral ACB  
 HYDROcodone-acetaminophen (NORCO) 5-325 mg per tablet 1 Tab  1 Tab Oral Q4H PRN  
 
 
LAB:   
Recent Labs 10/23/18 
3042 10/22/18 
2181 HCT 35.9 36.2 HGB 11.6 11.9 INR  --  1.0 Transfuse PRBC's:   
 
Assessment & Physician's Comment: 
Neurovascular checks within normal limits Orientation:  Oriented Principal Problem: 
  Fracture of greater trochanter of left femur ([address] (10/22/2018) Active Problems: 
  Acute hip pain, left (10/22/2018) Plan: 
 
Cont PT/OT - PWB through left leg Cont current pain management Ice packs to hip PRN 
ASA for DVT prophylaxis Medical management per primary team 
Case Management for disposition planning - will need rehab or snf Lee Alvarenga PA-C [facility]

## 2018-10-23 NOTE — PROGRESS NOTES
Problem: Pressure Injury - Risk of 
Goal: *Prevention of pressure injury Document Robe Scale and appropriate interventions in the flowsheet. Outcome: Progressing Towards Goal 
Pressure Injury Interventions: 
Sensory Interventions: Check visual cues for pain Moisture Interventions: Limit adult briefs Activity Interventions: Increase time out of bed, Pressure redistribution bed/mattress(bed type), PT/OT evaluation Mobility Interventions: HOB 30 degrees or less, Pressure redistribution bed/mattress (bed type), PT/OT evaluation Nutrition Interventions: Discuss nutritional consult with provider, Document food/fluid/supplement intake Problem: Falls - Risk of 
Goal: *Absence of Falls Document Rivera Levels Fall Risk and appropriate interventions in the flowsheet. Outcome: Progressing Towards Goal 
Fall Risk Interventions: 
Mobility Interventions: Patient to call before getting OOB Medication Interventions: Patient to call before getting OOB, Evaluate medications/consider consulting pharmacy Elimination Interventions: Toilet paper/wipes in reach, Elevated toilet seat, Patient to call for help with toileting needs History of Falls Interventions: Investigate reason for fall, Evaluate medications/consider consulting pharmacy

## 2018-10-23 NOTE — PROGRESS NOTES
Chart reviewed. CM met with patient and daughter at bedside. Patient has decided that she would like to go to [facility]. Preference is [address] [place] [address] location. CM sent referral via [employer]. Received notification from [facility], they are unable to accept this patient. CM met with patient/daughter to discuss alternate plan. Patient does not want to go to SNF. She would like to try for [facility]. CM sent referral. 
 
LUBNA Mills/CRM

## 2018-10-23 NOTE — PROGRESS NOTES
Bedside and Verbal shift change report given to [facility] (oncoming nurse) by Deborah Smith (offgoing nurse). Report included the following information SBAR, Kardex, Procedure Summary, Intake/Output, MAR and Recent Results.

## 2018-10-23 NOTE — PROGRESS NOTES
Bedside shift change report given to [employer] (oncoming nurse) by Damian Stewart (offgoing nurse). Report included the following information SBAR.

## 2018-10-23 NOTE — PROGRESS NOTES
Occupational Therapy Attempted to see for treatment however off the floor at this time at [place]. Will continue to follow.   
Laura Avila OTR/L

## 2018-10-23 NOTE — PROGRESS NOTES
Problem: Mobility Impaired (Adult and Pediatric) Goal: *Acute Goals and Plan of Care (Insert Text) Physical Therapy Goals Initiated 10/22/2018 1. Patient will move from supine to sit and sit to supine  and roll side to side in bed with modified independence within 7 day(s). 2.  Patient will transfer from bed to chair and chair to bed with modified independence using the least restrictive device within 7 day(s). 3.  Patient will perform sit to stand with modified independence within 7 day(s). 4.  Patient will ambulate with modified independence for 150 feet with the least restrictive device within 7 day(s). 5.  Patient will ascend/descend 4 stairs with 1 handrail(s) with modified independence within 7 day(s). physical Therapy TREATMENT Patient: Noreen Weiss (65 y.o. female) Date: 10/23/2018 Diagnosis: Fracture of greater trochanter of left femur (HCC) Acute hip pain, left Fracture of greater trochanter of left femur ([address] Precautions: Fall, PWB(LLE, no active L hip abduction) Chart, physical therapy assessment, plan of care and goals were reviewed. ASSESSMENT: 
Patient continues to struggle with dizziness and decreased BP with activity. Upon further questioning, this has been an issue for her for quite some time and she reports frequent falls at home, as often as every week. As noted yesterday, she does have some chronic R DF weakness with complaints of foot drop and on further testing today, note that she has some mild weakness on the R side and a subtle R facial droop. She also reports that she has noticed that she tends to drop things with her R hand (she is R hand dominant). Discussed with MD and he is ordering further work up. At this time, she is not maintaining her PWB consistently due to the R side weakness, some element of impulsivity and the symptomatic decrease in BP with longer time out of bed.   Recommend rehab once she is medically stable to address her balance and weakness issues. She may even benefit from an AFO on the R. 
Vitals:  
 10/23/18 0957 10/23/18 1003 10/23/18 1011 10/23/18 1014 BP: 142/64 139/77 96/62 131/75 BP 1 Location:      
BP Patient Position: During activity; Sitting During activity; Sitting During activity; Walking;Sitting Post activity;Supine Pulse: 87 88 94 88 Resp:      
Temp:      
SpO2:      
 
Progression toward goals: 
[]    Improving appropriately and progressing toward goals [x]    Improving slowly and progressing toward goals 
[]    Not making progress toward goals and plan of care will be adjusted PLAN: 
Patient continues to benefit from skilled intervention to address the above impairments. Continue treatment per established plan of care. Discharge Recommendations:  Inpatient Rehab Further Equipment Recommendations for Discharge: To be determined SUBJECTIVE:  
Patient stated I fall all the time, so do my sisters.  OBJECTIVE DATA SUMMARY:  
Critical Behavior: 
Neurologic State: Alert Orientation Level: Oriented X4 Cognition: Appropriate decision making Safety/Judgement: Awareness of environment, Fall prevention, Driving appropriateness, Good awareness of safety precautions, Insight into deficits, Home safety Functional Mobility Training: 
Bed Mobility: 
  
Supine to Sit: Minimum assistance; Additional time Sit to Supine: Additional time; Moderate assistance Transfers: 
Sit to Stand: Minimum assistance; Adaptive equipment; Additional time Stand to Sit: Minimum assistance; Adaptive equipment; Additional time Bed to Chair: Moderate assistance; Adaptive equipment; Additional time Balance: 
Sitting: Intact; Without support Standing: Intact; With support(needs hands on the walker at all times)Ambulation/Gait Training: 
Distance (ft): 40 Feet (ft) Assistive Device: Gait belt;Walker, rolling Ambulation - Level of Assistance: Minimal assistance; Adaptive equipment; Additional time Gait Abnormalities: Antalgic;Decreased step clearance; Step to gait;Trunk sway increased(mild foot drop RLE, does not maintain PWB consistently) Right Side Weight Bearing: Full Left Side Weight Bearing: Partial (%) Base of Support: Shift to left Stance: Left decreased Speed/Anusha: Pace decreased (<100 feet/min) Step Length: Left shortened;Right shortened Swing Pattern: Left asymmetrical 
  
Interventions: Safety awareness training; Tactile cues; Verbal cues; Visual/Demos Stairs: 
  
  
   
 
Neuro Re-Education: 
 
Therapeutic Exercises:  
 
Pain: 
Pain Scale 1: Numeric (0 - 10) Pain Intensity 1: 5 Pain Location 1: Leg 
Pain Orientation 1: Left Pain Description 1: Aching Pain Intervention(s) 1: Medication (see MAR) Activity Tolerance:  
Poor Please refer to the flowsheet for vital signs taken during this treatment. After treatment:  
[]    Patient left in no apparent distress sitting up in chair 
[x]    Patient left in no apparent distress in bed 
[x]    Call bell left within reach [x]    Nursing notified 
[x]    Caregiver present 
[]    Bed alarm activated COMMUNICATION/COLLABORATION:  
The patients plan of care was discussed with: Occupational Therapist, Registered Nurse, Physician and  Yuko Quintana, PT Time Calculation: 31 mins

## 2018-10-23 NOTE — PROGRESS NOTES
Patient's IV infiltrated and removed. Dr. Valerie Tidwell notified that patient does not have IV access now.

## 2018-10-23 NOTE — PROGRESS NOTES
Hospitalist Progress Note Martha Wing MD 
Answering service: [phone] OR [phone] from in house phone Date of Service:  10/23/2018 NAME:  Kisha Velasquez :  1940 MRN:  [id number] Admission Summary: A 67-year-old white female with past medical history of GERD, hiatal hernia who presented to the emergency department with chief complaint of left hip pain status post fall. The [facility] [address] [facility] [address] where she tripped and fell over a construction sign or object, falling to the ground. She subsequently had left hip pain which was severe to the point where she was unable to bear any weight. Pain remained constant, aching, 10/10, involving the left hip, aggravated with any movement or palpation, or weightbearing or touch. The pain with mostly located around the left hip and radiating towards her left buttock, left thigh region Interval history / Subjective:  
Pt feels OK, pain controlled She became orthostatic today with PT so boarded for CT head and ECHO as having rt sided weakness as well Assessment & Plan: 1. Left greater trochanter nondisplaced closed fracture. - fall precaution , MGMT per ortho non operable - paln to send to rehab [facility] 
- get CT and ECHO as she get orthostatic will give IVF 2.  Status post fall. Plan as noted above. 3.  Acute left hip pain. Provide pain management and supportive cares. - PRN norco and fentanyl 4. Cont heme meds for depression Code status: Full DVT prophylaxis: SCD Care Plan discussed with: Patient/Family and Nurse Disposition: TBD Hospital Problems  Date Reviewed: 10/22/2018 Codes Class Noted POA Acute hip pain, left ICD-10-CM: M25.552 ICD-9-CM: 719.45  10/22/2018 Unknown * (Principal) Fracture of greater trochanter of left femur ([facility] [address] ICD-10-CM: O82.057M ICD-9-CM: 820.20  10/22/2018 Unknown Review of Systems: A comprehensive review of systems was negative. Vital Signs:  
 Last 24hrs VS reviewed since prior progress note. Most recent are: 
Visit Vitals /75 (BP Patient Position: Post activity;Supine) Pulse 88 Temp 97.9 °F (36.6 °C) Resp 18 SpO2 93% Breastfeeding? No  
 
 
No intake or output data in the 24 hours ending 10/23/18 1113 Physical Examination:  
 
 
     
Constitutional:  No acute distress, ENT:  Oral mucous moist, Resp:  CTA bilaterally. CV:  Regular rhythm, normal rate, GI:  Soft, non distended, non tender bs+ Musculoskeletal:  No edema, warm, 2+ pulses throughout Neurologic:  Moves all extremities. AAOx3, CN II-XII reviewed Psych:  Good insight, Not anxious nor agitated. Data Review:  
 I personally reviewed  Image and labs Xr Hip Lt W Or Wo Pelv 2-3 Vws Result Date: 10/22/2018 IMPRESSION: No acute fracture. Xr Femur Lt 2 V Result Date: 10/22/2018 IMPRESSION: No acute fracture. Xr Tib/fib Lt Result Date: 10/22/2018 IMPRESSION: No acute fracture. Ct Low Ext Lt Wo Cont Result Date: 10/22/2018 IMPRESSION: 1. Subtle nondisplaced fracture of the left femoral greater trochanter. 2. No femoral neck fracture. 3. Soft tissue contusion is lateral to the proximal left femur. 4. 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 Xr Chest [facility] Result Date: 10/22/2018 IMPRESSION: No acute process. Labs:  
 
Recent Labs 10/23/18 
6417 10/22/18 
8866 WBC 8.0 14.6* HGB 11.6 11.9 HCT 35.9 36.2  302 Recent Labs 10/23/18 
6870 10/22/18 
2467  142  
K 4.1 3.9  109* CO2 24 25 BUN 17 17 CREA 1.01 1.07*  102* CA 8.8 8.5 MG  --  2.1 Recent Labs 10/22/18 
5532 SGOT 14* ALT 20 * TBILI 0.3 TP 6.7 ALB 3.3*  
GLOB 3.4 Recent Labs 10/22/18 
1986 INR 1.0 PTP 9.8 APTT 26.9 No results for input(s): FE, TIBC, PSAT, FERR in the last 72 hours. No results found for: FOL, RBCF No results for input(s): PH, PCO2, PO2 in the last 72 hours. Recent Labs 10/22/18 
2206 CPK 76 CKNDX 1.8  
TROIQ <0.05 No results found for: CHOL, CHOLX, CHLST, CHOLV, HDL, LDL, LDLC, DLDLP, TGLX, TRIGL, TRIGP, CHHD, CHHDX No results found for: Cecilia Rein No results found for: COLOR, APPRN, SPGRU, REFSG, HECTOR, PROTU, GLUCU, KETU, BILU, UROU, JAMES, LEUKU, GLUKE, EPSU, BACTU, WBCU, RBCU, CASTS, UCRY Medications Reviewed:  
 
Current Facility-Administered Medications Medication Dose Route Frequency  buPROPion SR (WELLBUTRIN SR) tablet 150 mg  150 mg Oral DAILY  sodium chloride (NS) flush 5-10 mL  5-10 mL IntraVENous Q8H  
 sodium chloride (NS) flush 5-10 mL  5-10 mL IntraVENous PRN  
 acetaminophen (TYLENOL) tablet 650 mg  650 mg Oral Q6H PRN  
 fentaNYL citrate (PF) injection 25 mcg  25 mcg IntraVENous Q4H PRN  pantoprazole (PROTONIX) tablet 40 mg  40 mg Oral ACB  
 HYDROcodone-acetaminophen (NORCO) 5-325 mg per tablet 1 Tab  1 Tab Oral Q4H PRN  
 
______________________________________________________________________ EXPECTED LENGTH OF STAY: 2d 21h ACTUAL LENGTH OF STAY:          1 Karen Underwood MD

## 2018-10-24 LAB — CORTIS AM PEAK SERPL-MCNC: 11.3 UG/DL (ref 4.3–22.45)

## 2018-10-24 PROCEDURE — 97116 GAIT TRAINING THERAPY: CPT | Performed by: PHYSICAL THERAPIST

## 2018-10-24 PROCEDURE — 82533 TOTAL CORTISOL: CPT | Performed by: HOSPITALIST

## 2018-10-24 PROCEDURE — 74011250637 HC RX REV CODE- 250/637: Performed by: HOSPITALIST

## 2018-10-24 PROCEDURE — 97530 THERAPEUTIC ACTIVITIES: CPT | Performed by: PHYSICAL THERAPIST

## 2018-10-24 PROCEDURE — 74011250637 HC RX REV CODE- 250/637: Performed by: FAMILY MEDICINE

## 2018-10-24 PROCEDURE — 97535 SELF CARE MNGMENT TRAINING: CPT | Performed by: OCCUPATIONAL THERAPIST

## 2018-10-24 PROCEDURE — 36415 COLL VENOUS BLD VENIPUNCTURE: CPT | Performed by: HOSPITALIST

## 2018-10-24 PROCEDURE — 74011000250 HC RX REV CODE- 250: Performed by: HOSPITALIST

## 2018-10-24 PROCEDURE — 74011250637 HC RX REV CODE- 250/637: Performed by: PHYSICIAN ASSISTANT

## 2018-10-24 PROCEDURE — 65270000029 HC RM PRIVATE

## 2018-10-24 PROCEDURE — 74011250636 HC RX REV CODE- 250/636: Performed by: HOSPITALIST

## 2018-10-24 RX ORDER — MIDODRINE HYDROCHLORIDE 2.5 MG/1
2.5 TABLET ORAL 2 TIMES DAILY WITH MEALS
Status: DISCONTINUED | OUTPATIENT
Start: 2018-10-24 | End: 2018-10-25 | Stop reason: HOSPADM

## 2018-10-24 RX ADMIN — Medication 10 ML: at 21:35

## 2018-10-24 RX ADMIN — DOCUSATE SODIUM 100 MG: 100 CAPSULE, LIQUID FILLED ORAL at 09:25

## 2018-10-24 RX ADMIN — ASPIRIN 81 MG: 81 TABLET, COATED ORAL at 09:25

## 2018-10-24 RX ADMIN — SODIUM CHLORIDE 500 ML: 900 INJECTION, SOLUTION INTRAVENOUS at 12:57

## 2018-10-24 RX ADMIN — MIDODRINE HYDROCHLORIDE 2.5 MG: 2.5 TABLET ORAL at 17:11

## 2018-10-24 RX ADMIN — HYDROCODONE BITARTRATE AND ACETAMINOPHEN 1 TABLET: 5; 325 TABLET ORAL at 17:11

## 2018-10-24 RX ADMIN — HYDROCODONE BITARTRATE AND ACETAMINOPHEN 1 TABLET: 5; 325 TABLET ORAL at 07:56

## 2018-10-24 RX ADMIN — HYDROCODONE BITARTRATE AND ACETAMINOPHEN 1 TABLET: 5; 325 TABLET ORAL at 02:20

## 2018-10-24 RX ADMIN — HYDROCODONE BITARTRATE AND ACETAMINOPHEN 1 TABLET: 5; 325 TABLET ORAL at 12:23

## 2018-10-24 RX ADMIN — POLYETHYLENE GLYCOL 3350 17 G: 17 POWDER, FOR SOLUTION ORAL at 09:25

## 2018-10-24 RX ADMIN — BUPROPION HYDROCHLORIDE 150 MG: 150 TABLET, EXTENDED RELEASE ORAL at 09:25

## 2018-10-24 RX ADMIN — HYDROCODONE BITARTRATE AND ACETAMINOPHEN 1 TABLET: 5; 325 TABLET ORAL at 21:35

## 2018-10-24 RX ADMIN — MIDODRINE HYDROCHLORIDE 2.5 MG: 2.5 TABLET ORAL at 09:45

## 2018-10-24 RX ADMIN — PANTOPRAZOLE SODIUM 40 MG: 40 TABLET, DELAYED RELEASE ORAL at 06:54

## 2018-10-24 NOTE — PROGRESS NOTES
Bedside shift change report given to Vince Fairbanks RN (oncoming nurse) by Negro Peacock RN (offgoing nurse). Report included the following information SBAR, Kardex and Recent Results.

## 2018-10-24 NOTE — ROUTINE PROCESS
Bedside shift change report given to Alber Wilson (oncoming nurse) by Derrel Severs (offgoing nurse). Report included the following information SBAR.

## 2018-10-24 NOTE — PROGRESS NOTES
Chart reviewed. JACKIE received notification from Marian Lyons with [facility] rehab that this patient has been approved. They are requesting transport be after 5:30 PM. Spoke with MD, patient is not medically stable for discharge. Will hold discharge until tomorrow. CM placed [facility] on will call for tomorrow. LUBNA Charles/VENKATA

## 2018-10-24 NOTE — PROGRESS NOTES
Hospitalist Progress Note Bobby Nicolas MD 
Answering service: [phone] OR [phone] from in house phone Date of Service:  10/24/2018 NAME:  Imani Castillo :  1940 MRN:  [id number] Admission Summary: A 67-year-old white female with past medical history of GERD, hiatal hernia who presented to the emergency department with chief complaint of left hip pain status post fall. [facility] [address] she tripped and fell over a construction sign or object, falling to the ground. She subsequently had left hip pain which was severe to the point where she was unable to bear any weight. Pain remained constant, aching, 10/10, involving the left hip, aggravated with any movement or palpation, or weightbearing or touch. The pain with mostly located around the left hip and radiating towards her left buttock, left thigh region Interval history / Subjective:  
Pt feels OK, pain controlled but gets dizzy orthostatic when tries to do PT 
CT/ ECHO WNL Assessment & Plan: 1. Left greater trochanter nondisplaced closed fracture. - fall precaution , MGMT per ortho non operable - paln to send to rehab [facility] 
- CT/ ECHO - WNL 
- will start midodrine - check cortisol 2. Status post fall. Plan as noted above. 3.  Acute left hip pain. Provide pain management and supportive cares. - PRN norco and fentanyl 4. Cont heme meds for depression Code status: Full DVT prophylaxis: SCD Care Plan discussed with: Patient/Family and Nurse Disposition: TBD Hospital Problems  Date Reviewed: 10/22/2018 Codes Class Noted POA Acute hip pain, left ICD-10-CM: M25.552 ICD-9-CM: 719.45  10/22/2018 Unknown * (Principal) [facility] ICD-10-CM: S22.668Q ICD-9-CM: 820.20  10/22/2018 Unknown Review of Systems: A comprehensive review of systems was negative. Vital Signs:  
 Last 24hrs VS reviewed since prior progress note. Most recent are: 
Visit Vitals BP (!) 159/96 Pulse 81 Temp 97.5 °F (36.4 °C) Resp 16 SpO2 91% Breastfeeding? No  
 
 
 
Intake/Output Summary (Last 24 hours) at 10/24/2018 1033 Last data filed at 10/23/2018 2302 Gross per 24 hour Intake 425 ml Output  Net 425 ml Physical Examination:  
 
 
     
Constitutional:  No acute distress, ENT:  Oral mucous moist, Resp:  CTA bilaterally. CV:  Regular rhythm, normal rate, GI:  Soft, non distended, non tender bs+ Musculoskeletal:  No edema, warm, 2+ pulses throughout Neurologic:  Moves all extremities. AAOx3, CN II-XII reviewed Psych:  Good insight, Not anxious nor agitated. Data Review:  
 I personally reviewed  Image and labs Xr Hip Lt W Or Wo Pelv 2-3 Vws Result Date: 10/22/2018 IMPRESSION: No acute fracture. Xr Femur Lt 2 V Result Date: 10/22/2018 IMPRESSION: No acute fracture. Xr Tib/fib Lt Result Date: 10/22/2018 IMPRESSION: No acute fracture. Ct Head Wo Cont Result Date: 10/23/2018 IMPRESSION: No acute intracranial process identified. Ct Low Ext Lt Wo Cont Result Date: 10/22/2018 IMPRESSION: 1. Subtle nondisplaced fracture of the left femoral greater trochanter. 2. No femoral neck fracture. 3. Soft tissue contusion is lateral to the proximal left femur. 4. 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 Xr Chest Nika Decker Result Date: 10/22/2018 IMPRESSION: No acute process. Labs:  
 
Recent Labs 10/23/18 
6450 10/22/18 
7449 WBC 8.0 14.6* HGB 11.6 11.9 HCT 35.9 36.2  302 Recent Labs 10/23/18 
3893 10/22/18 
0183  142  
K 4.1 3.9  109* CO2 24 25 BUN 17 17 CREA 1.01 1.07*  102* CA 8.8 8.5 MG  --  2.1 Recent Labs 10/22/18 
8996 SGOT 14* ALT 20 * TBILI 0.3 TP 6.7 ALB 3.3*  
GLOB 3.4 Recent Labs 10/22/18 
0613 INR 1.0 PTP 9.8 APTT 26.9 No results for input(s): FE, TIBC, PSAT, FERR in the last 72 hours. No results found for: FOL, RBCF No results for input(s): PH, PCO2, PO2 in the last 72 hours. Recent Labs 10/22/18 
9217 CPK 76 CKNDX 1.8  
TROIQ <0.05 No results found for: CHOL, CHOLX, CHLST, CHOLV, HDL, LDL, LDLC, DLDLP, TGLX, TRIGL, TRIGP, CHHD, CHHDX No results found for: Bhaskar Smita No results found for: COLOR, APPRN, SPGRU, REFSG, HECTOR, PROTU, GLUCU, KETU, BILU, UROU, JAMES, LEUKU, GLUKE, EPSU, BACTU, WBCU, RBCU, CASTS, UCRY Medications Reviewed:  
 
Current Facility-Administered Medications Medication Dose Route Frequency  midodrine (PROAMITINE) tablet 2.5 mg  2.5 mg Oral BID WITH MEALS  
 aspirin delayed-release tablet 81 mg  81 mg Oral DAILY  docusate sodium (COLACE) capsule 100 mg  100 mg Oral Q12H  polyethylene glycol (MIRALAX) packet 17 g  17 g Oral DAILY  buPROPion SR (WELLBUTRIN SR) tablet 150 mg  150 mg Oral DAILY  sodium chloride (NS) flush 5-10 mL  5-10 mL IntraVENous Q8H  
 sodium chloride (NS) flush 5-10 mL  5-10 mL IntraVENous PRN  
 acetaminophen (TYLENOL) tablet 650 mg  650 mg Oral Q6H PRN  
 fentaNYL citrate (PF) injection 25 mcg  25 mcg IntraVENous Q4H PRN  pantoprazole (PROTONIX) tablet 40 mg  40 mg Oral ACB  
 HYDROcodone-acetaminophen (NORCO) 5-325 mg per tablet 1 Tab  1 Tab Oral Q4H PRN  
 
______________________________________________________________________ EXPECTED LENGTH OF STAY: 2d 21h ACTUAL LENGTH OF STAY:          2 Brigida Hall MD

## 2018-10-24 NOTE — PROGRESS NOTES
Problem: Falls - Risk of 
Goal: *Absence of Falls Document Shruti Falk Fall Risk and appropriate interventions in the flowsheet. Outcome: Progressing Towards Goal 
Fall Risk Interventions: 
Mobility Interventions: PT Consult for mobility concerns Medication Interventions: Patient to call before getting OOB Elimination Interventions: Call light in reach History of Falls Interventions: Door open when patient unattended

## 2018-10-24 NOTE — PROGRESS NOTES
Problem: Self Care Deficits Care Plan (Adult) Goal: *Acute Goals and Plan of Care (Insert Text) Occupational Therapy Goals Initiated 10/22/2018 1. Patient will perform lower body dressing using adaptive equipment as needed at supervision/set-up level within 7 days. 2. Patient will perform toilet transfers at supervision/set-up level using Walkers, Type: Rolling Walker and LLE PWB  within 7 days. 3. Patient will perform all aspects of toileting at supervision/set-up level within 7 days. 4. Patient will demonstrate her L hip precautions of PWB and no active L hip abduction without cues within 7 days. Occupational Therapy TREATMENT Patient: Nabeel Dawn (30 y.o. female) Date: 10/24/2018 Diagnosis: Fracture of greater trochanter of left femur (HCC) Acute hip pain, left Fracture of greater trochanter of left femur ([address] Precautions: Fall, PWB(LLE, no active L hip abduction) Chart, occupational therapy assessment, plan of care, and goals were reviewed. ASSESSMENT: 
Patient with continued decreased activity tolerance, upon sitting blood pressure initially elevated and upon standing dropped, reported feeling foggy/lightheaded. Daughter present and patient using bed pan due to events of this morning in the bathroom. At this time recommend stand pivot to bedside commode for toileting with assist x's 1 and monitor signs/symptoms/blood pressure. Instructed to increase time out of bed and up in chair for all meals. Progression toward goals: 
[]       Improving appropriately and progressing toward goals [x]       Improving slowly and progressing toward goals 
[]       Not making progress toward goals and plan of care will be adjusted PLAN: 
Patient continues to benefit from skilled intervention to address the above impairments. Continue treatment per established plan of care. Discharge Recommendations:  Rehab Further Equipment Recommendations for Discharge:  Rolling walker delivered to room, ? If need to order in rehab instead SUBJECTIVE:  
Patient stated I really just want to stay in bed.  OBJECTIVE DATA SUMMARY:  
Cognitive/Behavioral Status: 
Neurologic State: Alert Orientation Level: Oriented X4 Cognition: No command following;Decreased attention/concentration;Decreased command following Perception: Appears intact Perseveration: No perseveration noted Safety/Judgement: Awareness of environment; Fall prevention;Home safety; Insight into deficits Functional Mobility and Transfers for ADLs:Bed Mobility: 
Supine to Sit: Minimum assistance; Additional time; Adaptive equipment; Other (comment)(head of bed elevated ~80 degrees) Sit to Supine: Minimum assistance; Adaptive equipment Scooting: Supervision Transfers: 
Sit to Stand: Minimum assistance;Assist x1(instructed on hand placement, max cues to follow) Functional Transfers Bathroom Mobility: Dependent/total assistance(unable to tolerate) Toilet Transfer : Minimum assistance; Adaptive equipment;Assist x1;Additional time(stand pivot, max cues for PWB and hand placement) Bed to Chair: Minimum assistance; Adaptive equipment; Additional time;Assist x1 ADL Intervention: 
 instructed to push to stand and reach back to sit bilateral hands, max cues to follow Patient instructed and indicated understanding the benefits of maintaining activity tolerance, functional mobility, and independence with self care tasks during acute stay  to ensure safe return home and to baseline. Encouraged patient to increase frequency and duration OOB, be out of bed for all meals, perform daily ADLs (as approved by RN/MD regarding bathing etc), and performing functional mobility to/from bedside commode with assist 
 
Toileting Toileting Assistance: Minimum assistance Bladder Hygiene: Supervision/set-up Clothing Management: Maximum assistance Adaptive Equipment: Walker(bedside commode) Cognitive Retraining Safety/Judgement: Awareness of environment; Fall prevention;Home safety; Insight into deficits Pain:No significant complaintActivity Tolerance:  
Poor to fair Please refer to the flowsheet for vital signs taken during this treatment. After treatment:  
[] Patient left in no apparent distress sitting up in chair 
[x] Patient left in no apparent distress in bed 
[x] Call bell left within reach [x] Nursing notified 
[x] Caregiver present 
[] Bed alarm activated COMMUNICATION/COLLABORATION:  
The patients plan of care was discussed with: Physical Therapist and Registered Nurse AJAY Eden/L Time Calculation: 30 mins

## 2018-10-24 NOTE — PROGRESS NOTES
Problem: Mobility Impaired (Adult and Pediatric) Goal: *Acute Goals and Plan of Care (Insert Text) Physical Therapy Goals Initiated 10/22/2018 1. Patient will move from supine to sit and sit to supine  and roll side to side in bed with modified independence within 7 day(s). 2.  Patient will transfer from bed to chair and chair to bed with modified independence using the least restrictive device within 7 day(s). 3.  Patient will perform sit to stand with modified independence within 7 day(s). 4.  Patient will ambulate with modified independence for 150 feet with the least restrictive device within 7 day(s). 5.  Patient will ascend/descend 4 stairs with 1 handrail(s) with modified independence within 7 day(s). physical Therapy TREATMENT Patient: Nataly Jung (17 y.o. female) Date: 10/24/2018 Diagnosis: Fracture of greater trochanter of left femur (HCC) Acute hip pain, left Fracture of greater trochanter of left femur ([address] Precautions: Fall, PWB(LLE, no active L hip abduction) Chart, physical therapy assessment, plan of care and goals were reviewed. ASSESSMENT: 
Patient making steady progress toward goals. Patient currently needing Kapil for supine to sit transfer with cues to avoid L hip abd. Instructed in use of gait belt to assist leg in/out of bed. Sit to stand with Kapil x 1 with cues needed for hand placement and safety. Worked on transfers and maintaining PWB as this was difficult for her to maintain. BP was stable through session:  121/72 supine, 140/72 sitting, 121/74 standing. Patient is safe to use bedside commode and notified RN. Continue to recommend inpt rehab setting. Progression toward goals: 
[]    Improving appropriately and progressing toward goals [x]    Improving slowly and progressing toward goals 
[]    Not making progress toward goals and plan of care will be adjusted PLAN: 
Patient continues to benefit from skilled intervention to address the above impairments. Continue treatment per established plan of care. Discharge Recommendations:  Inpatient Rehab Further Equipment Recommendations for Discharge:  rolling walker SUBJECTIVE:  
Patient stated I'm sorry I'm not being a good patient.  OBJECTIVE DATA SUMMARY:  
Critical Behavior: 
Neurologic State: Alert Orientation Level: Oriented X4 Cognition: No command following, Decreased attention/concentration, Decreased command following Safety/Judgement: Awareness of environment, Fall prevention, Home safety, Insight into deficits Functional Mobility Training: 
Bed Mobility: 
  
Supine to Sit: Minimum assistance; Additional time; Adaptive equipment; Other (comment)(head of bed elevated ~80 degrees) Sit to Supine: Minimum assistance; Adaptive equipment Scooting: Supervision Transfers: 
Sit to Stand: Minimum assistance;Assist x1(instructed on hand placement, max cues to follow) Stand to Sit: Contact guard assistance(cues for technique) Bed to Chair: Minimum assistance; Adaptive equipment; Additional time;Assist x1 Balance: 
Sitting: Intact Standing: Intact; With supportAmbulation/Gait Training: 
Distance (ft): 5 Feet (ft)(x 2) Assistive Device: Gait belt;Walker, rolling Ambulation - Level of Assistance: Minimal assistance;Assist x1 Gait Abnormalities: Antalgic;Decreased step clearance; Step to gait(difficulty maintaining PWB) Base of Support: Shift to left Stance: Left decreased Speed/Anusha: Pace decreased (<100 feet/min); Slow Pain: 
Pain Scale 1: Numeric (0 - 10) Pain Intensity 1: 5 Pain Location 1: Hip Pain Orientation 1: Left Pain Description 1: Aching Pain Intervention(s) 1: Medication (see MAR); Rest 
Activity Tolerance: VSS Please refer to the flowsheet for vital signs taken during this treatment. After treatment:  
[]    Patient left in no apparent distress sitting up in chair 
[x]    Patient left in no apparent distress in bed [x]    Call bell left within reach [x]    Nursing notified 
[x]    Caregiver present 
[]    Bed alarm activated COMMUNICATION/COLLABORATION:  
The patients plan of care was discussed with: Physical Therapist, Occupational Therapist and Registered Nurse Gabby Phillip PT, DPT Time Calculation: 28 mins

## 2018-10-24 NOTE — PROGRESS NOTES
Bedside shift change report given to Kelton Mixon (oncoming nurse) by Kathleen Wood (offgoing nurse). Report included the following information SBAR.

## 2018-10-24 NOTE — PROGRESS NOTES
ORTH FRACTURE PROGRESS NOTE 2018 Admit Date:  
10/21/2018 Post Op day: * No surgery found * Subjective:   
Janice Renae states that she has some pain in left hip with movement but it is improved. Continues to have orthostatic hypotension issues and nearly passed out on BSC this morning. She states that she has issues like this at home frequently primarily associated with positional changes. Had CT and Echo yesterday. No new complaints Tolerating diet Denies N/V/SOB or CP  
 
PT/OT:  
Gait:  Gait Base of Support: Shift to left Speed/Anusha: Pace decreased (<100 feet/min) Step Length: Left shortened, Right shortened Swing Pattern: Left asymmetrical 
Stance: Left decreased Gait Abnormalities: Antalgic, Decreased step clearance, Step to gait, Trunk sway increased(mild foot drop RLE, does not maintain PWB consistently) Ambulation - Level of Assistance: Minimal assistance, Adaptive equipment, Additional time Distance (ft): 40 Feet (ft) Assistive Device: Gait belt, Walker, rolling Interventions: Safety awareness training, Tactile cues, Verbal cues, Visual/Demos Interventions: Safety awareness training, Tactile cues, Verbal cues, Visual/Demos Vital Signs:   
Patient Vitals for the past 8 hrs: 
 BP Temp Pulse Resp SpO2  
10/24/18 0817 (!) 159/96 97.5 °F (36.4 °C) 81 16 91 % 10/24/18 0213 123/72 98.2 °F (36.8 °C) 82 16 96 % Temp (24hrs), Av.9 °F (36.6 °C), Min:97.5 °F (36.4 °C), Max:98.2 °F (36.8 °C) Pain Control:  
Pain Assessment Pain Scale 1: Numeric (0 - 10) Pain Intensity 1: 2 Pain Onset 1: with movement Pain Location 1: Hip Pain Orientation 1: Left Pain Description 1: Aching Pain Intervention(s) 1: Medication (see MAR) Meds: 
 
Current Facility-Administered Medications Medication Dose Route Frequency  midodrine (PROAMITINE) tablet 2.5 mg  2.5 mg Oral BID WITH MEALS  
 aspirin delayed-release tablet 81 mg  81 mg Oral DAILY  docusate sodium (COLACE) capsule 100 mg  100 mg Oral Q12H  polyethylene glycol (MIRALAX) packet 17 g  17 g Oral DAILY  buPROPion SR (WELLBUTRIN SR) tablet 150 mg  150 mg Oral DAILY  sodium chloride (NS) flush 5-10 mL  5-10 mL IntraVENous Q8H  
 sodium chloride (NS) flush 5-10 mL  5-10 mL IntraVENous PRN  
 acetaminophen (TYLENOL) tablet 650 mg  650 mg Oral Q6H PRN  
 fentaNYL citrate (PF) injection 25 mcg  25 mcg IntraVENous Q4H PRN  pantoprazole (PROTONIX) tablet 40 mg  40 mg Oral ACB  
 HYDROcodone-acetaminophen (NORCO) 5-325 mg per tablet 1 Tab  1 Tab Oral Q4H PRN  
 
 
LAB:   
Recent Labs 10/23/18 
3948 10/22/18 
3094 HCT 35.9 36.2 HGB 11.6 11.9 INR  --  1.0 Transfuse PRBC's:   
 
Assessment & Physician's Comment: 
Neurovascular checks within normal limits Orientation:  Oriented Principal Problem: 
  Fracture of greater trochanter of left femur ([address] (10/22/2018) Active Problems: 
  Acute hip pain, left (10/22/2018) Plan: 
 
Cont PT/OT - PWB through left leg Cont current pain management Hospitalist to try Midodrine and check cortisol levels to further eval of orthostasis Medical management per primary teams Ok to d/c from Ortho standpoint but will likely need further medical work-up Afia Bailey PA-C [id number]

## 2018-10-25 VITALS
OXYGEN SATURATION: 93 % | TEMPERATURE: 97.7 F | SYSTOLIC BLOOD PRESSURE: 108 MMHG | RESPIRATION RATE: 16 BRPM | DIASTOLIC BLOOD PRESSURE: 75 MMHG | HEART RATE: 74 BPM

## 2018-10-25 PROBLEM — F32.A DEPRESSION: Status: ACTIVE | Noted: 2018-10-25

## 2018-10-25 PROCEDURE — 74011250637 HC RX REV CODE- 250/637: Performed by: HOSPITALIST

## 2018-10-25 PROCEDURE — 74011250637 HC RX REV CODE- 250/637: Performed by: FAMILY MEDICINE

## 2018-10-25 PROCEDURE — 74011250637 HC RX REV CODE- 250/637: Performed by: PHYSICIAN ASSISTANT

## 2018-10-25 RX ORDER — ACETAMINOPHEN 325 MG/1
650 TABLET ORAL
Qty: 30 TAB | Refills: 0 | Status: SHIPPED
Start: 2018-10-25 | End: 2021-10-14

## 2018-10-25 RX ORDER — DOCUSATE SODIUM 100 MG/1
100 CAPSULE, LIQUID FILLED ORAL EVERY 12 HOURS
Qty: 60 CAP | Refills: 2 | Status: SHIPPED
Start: 2018-10-25 | End: 2019-01-23

## 2018-10-25 RX ORDER — POLYETHYLENE GLYCOL 3350 17 G/17G
17 POWDER, FOR SOLUTION ORAL DAILY
Qty: 30 PACKET | Refills: 0 | Status: SHIPPED
Start: 2018-10-25 | End: 2018-11-24

## 2018-10-25 RX ORDER — MIDODRINE HYDROCHLORIDE 2.5 MG/1
2.5 TABLET ORAL 2 TIMES DAILY WITH MEALS
Qty: 60 TAB | Refills: 0 | Status: SHIPPED
Start: 2018-10-25 | End: 2018-11-24

## 2018-10-25 RX ORDER — HYDROCODONE BITARTRATE AND ACETAMINOPHEN 5; 325 MG/1; MG/1
1 TABLET ORAL
Qty: 30 TAB | Refills: 0 | Status: SHIPPED | OUTPATIENT
Start: 2018-10-25 | End: 2021-10-14

## 2018-10-25 RX ADMIN — DOCUSATE SODIUM 100 MG: 100 CAPSULE, LIQUID FILLED ORAL at 09:05

## 2018-10-25 RX ADMIN — PANTOPRAZOLE SODIUM 40 MG: 40 TABLET, DELAYED RELEASE ORAL at 07:29

## 2018-10-25 RX ADMIN — HYDROCODONE BITARTRATE AND ACETAMINOPHEN 1 TABLET: 5; 325 TABLET ORAL at 07:34

## 2018-10-25 RX ADMIN — ASPIRIN 81 MG: 81 TABLET, COATED ORAL at 09:05

## 2018-10-25 RX ADMIN — BUPROPION HYDROCHLORIDE 150 MG: 150 TABLET, EXTENDED RELEASE ORAL at 09:05

## 2018-10-25 RX ADMIN — Medication 10 ML: at 07:32

## 2018-10-25 RX ADMIN — MIDODRINE HYDROCHLORIDE 2.5 MG: 2.5 TABLET ORAL at 07:29

## 2018-10-25 RX ADMIN — ACETAMINOPHEN 650 MG: 325 TABLET, FILM COATED ORAL at 12:06

## 2018-10-25 RX ADMIN — HYDROCODONE BITARTRATE AND ACETAMINOPHEN 1 TABLET: 5; 325 TABLET ORAL at 02:35

## 2018-10-25 NOTE — PROGRESS NOTES
The patient will be taken to [facility] via [facility] for transport. [facility] EMTALA, KARDEX< MAR, and D/C instructions were included.

## 2018-10-25 NOTE — DISCHARGE INSTRUCTIONS
Discharge SNF/Rehab Instructions/LTAC       PATIENT ID: Jennyfer Smith  MRN: [id number]   YOB: 1940    DATE OF ADMISSION: 10/21/2018 11:42 PM    DATE OF DISCHARGE: 10/25/2018  PRIMARY CARE PROVIDER: Pelon Maki MD     ATTENDING PHYSICIAN: Andre Lowe MD  DISCHARGING PROVIDER: Andre Lowe MD    To contact this individual call [phone] and ask the  to page. If unavailable ask to be transferred the Adult Hospitalist Department. CONSULTATIONS: IP CONSULT TO ORTHOPEDIC SURGERY  IP CONSULT TO HOSPITALIST    PROCEDURES/SURGERIES: * No surgery found *    ADMITTING DIAGNOSES & HOSPITAL COURSE:   L greater trochanter displaced closed fracture - non operable. Orthostatic hypotension   Vasovagal episode      DISCHARGE DIAGNOSES / PLAN:      Left greater trochanter nondisplaced closed fracture - non operable, medical management.     - fall precautions  - PT/OT following, and recommend rehab   - Norco PRN, tylenol PRN (do not exceed 3g tylenol total per day, careful with co administration with norco and tylenol)    Episode of hypotension on 10/23 - in the setting of getting a bath and brushing teeth. - s/p IVF bolus  - Midodrine started 10/24, with higher BPs, and now assytmpomatic.   - Please HOLD midodrine if BP systolic is over or equal to 140, this medication is unlikely to be needed long term. Should follow up with primary care as well. - Advised to get up slowly, sit on the side of the bed.    - Cortisol 10/25 11.2, normal    Depression - home wellbutrin  GERD - home PPI    Code status: FULL  DVT Ppx - SCD         PENDING TEST RESULTS:   At the time of discharge the following test results are still pending: None    FOLLOW UP APPOINTMENTS:    Follow-up Information     Follow up With Specialties Details Why Contact Info    Pelon Maki MD Internal Medicine In 2 weeks Hospital follow up  [address] [place] [address] Ronda [place] [address]  [phone]     Dr. Leslie Sullivan of [address] [place] [address] in 2 weeks regarding left hip fracture follow-up. ADDITIONAL CARE RECOMMENDATIONS:   1. Please take all medications as prescribed. Note changes as below. Important not to take over 3grams of tylenol per day. 2. Please make sure to follow up with your primary care physician within 1-2 weeks of discharge for hospital follow up  3. Please make continue to monitor for signs of low blood pressure, including dizziness, lightheadedness, and diaphoresis  4. You came into the hospital after a fall, and was found to have an acute fracture. You will be treated with pain medications, physical therapy and rehab. 5. Please make sure to get up slowly from seated position. You should allow yourself about 5 min on the edge of the bed before getting up. 6. Midodrine should be HELD if your blood pressure is above or equal to 097 systolic. DIET: Regular Diet    ACTIVITY: PT/OT Eval and Treat    WOUND CARE: None    EQUIPMENT needed: Per PT/OT      DISCHARGE MEDICATIONS:  Current Discharge Medication List      START taking these medications    Details   acetaminophen (TYLENOL) 325 mg tablet Take 2 Tabs by mouth every six (6) hours as needed. Patient not to take over 3grams per day of tylenol (including Norco)  Qty: 30 Tab, Refills: 0      docusate sodium (COLACE) 100 mg capsule Take 1 Cap by mouth every twelve (12) hours for 90 days. Qty: 60 Cap, Refills: 2      HYDROcodone-acetaminophen (NORCO) 5-325 mg per tablet Take 1 Tab by mouth every four (4) hours as needed. Max Daily Amount: 6 Tabs. Qty: 30 Tab, Refills: 0    Associated Diagnoses: Nondisplaced fracture of greater trochanter of left femur, initial encounter for closed fracture (HCC)      midodrine (PROAMITINE) 2.5 mg tablet Take 1 Tab by mouth two (2) times daily (with meals) for 30 days.   Qty: 60 Tab, Refills: 0      polyethylene glycol (MIRALAX) 17 gram packet Take 1 Packet by mouth daily for 30 days. Qty: 30 Packet, Refills: 0         CONTINUE these medications which have NOT CHANGED    Details   aspirin delayed-release 81 mg tablet Take 81 mg by mouth daily. lansoprazole (PREVACID) 15 mg disintegrating tablet Take 15 mg by mouth daily (before breakfast). buPROPion SR (WELLBUTRIN SR) 150 mg SR tablet Take 150 mg by mouth daily. NOTIFY YOUR PHYSICIAN FOR ANY OF THE FOLLOWING:   Fever over 101 degrees for 24 hours. Chest pain, shortness of breath, fever, chills, nausea, vomiting, diarrhea, change in mentation, falling, weakness, bleeding. Severe pain or pain not relieved by medications. Or, any other signs or symptoms that you may have questions about. DISPOSITION:    Home With:   OT  PT  HH  RN      X Long term SNF/Inpatient Rehab    Independent/assisted living    Hospice    Other:       PATIENT CONDITION AT DISCHARGE:     Functional status    Poor    X Deconditioned     Independent      Cognition   X  Lucid     Forgetful     Dementia      Catheters/lines (plus indication)    Santos     PICC     PEG    X None      Code status    X Full code     DNR      PHYSICAL EXAMINATION AT DISCHARGE  Visit Vitals  /70 (BP 1 Location: Right arm, BP Patient Position: At rest)   Pulse 75   Temp 97.9 °F (36.6 °C)   Resp 16   SpO2 91%   Breastfeeding? No                                                     Constitutional:  No acute distress, laying in bed   ENT:  Oral mucous moist,    Resp:  CTA bilaterally. CV:  Regular rhythm, normal rate,     GI:  Soft, non distended, non tender bs+    Musculoskeletal:  No edema, warm, 2+ pulses throughout. Tenderness over the L upper thigh, able to move LLE    Neurologic:  Moves all extremities.   AAOx3, CN II-XII reviewed                         Psych:  Good insight, Not anxious nor agitated.      :     CHRONIC MEDICAL DIAGNOSES:  Problem List as of 10/25/2018 Date Reviewed: 10/22/2018          Codes Class Noted - Resolved Depression ICD-10-CM: F32.9  ICD-9-CM: 202  10/25/2018 - Present        Acute hip pain, left ICD-10-CM: M25.552  ICD-9-CM: 719.45  10/22/2018 - Present        * (Principal) Fracture of greater trochanter of left femur ([facility] [address] ICD-10-CM: T50.582D  ICD-9-CM: 820.20  10/22/2018 - Present              Greater than 30 minutes were spent with the patient on counseling and coordination of care    CDMP Checked: Yes X     PROBLEM LIST Updated:   Yes X         Signed:   Rio Plummer MD  10/25/2018  8:31 AM     .

## 2018-10-25 NOTE — PROGRESS NOTES
Informed Dr. Cinda hC of the patient's orthostatic BP taken  this morning. The patient did experience some dizziness in the process. Per Md. Discharge will continue; patient will need to take it easy.

## 2018-10-25 NOTE — DISCHARGE SUMMARY
Discharge Summary       PATIENT ID: Garcia Kennedy  MRN: [id number]   YOB: 1940    DATE OF ADMISSION: 10/21/2018 11:42 PM    DATE OF DISCHARGE: 10/25/2018  PRIMARY CARE PROVIDER: Pillo Sanchez MD     ATTENDING PHYSICIAN: Holly Severin, MD  DISCHARGING PROVIDER: Holly Severin, MD    To contact this individual call [phone] and ask the  to page. If unavailable ask to be transferred the Adult Hospitalist Department. CONSULTATIONS: IP CONSULT TO ORTHOPEDIC SURGERY  IP CONSULT TO HOSPITALIST    PROCEDURES/SURGERIES: * No surgery found *    ADMITTING DIAGNOSES & HOSPITAL COURSE:   L greater trochanter displaced closed fracture - non operable. Orthostatic hypotension   Vasovagal episode      DISCHARGE DIAGNOSES / PLAN:      Left greater trochanter nondisplaced closed fracture - non operable, medical management.     - fall precautions  - PT/OT following, and recommend rehab   - Norco PRN, tylenol PRN (do not exceed 3g tylenol total per day, careful with co administration with norco and tylenol)    Episode of hypotension on 10/23 - in the setting of getting a bath and brushing teeth. - s/p IVF bolus  - Midodrine started 10/24, with higher BPs, and now assytmpomatic.   - Please HOLD midodrine if BP systolic is over or equal to 140, this medication is unlikely to be needed long term. Should follow up with primary care as well. - Advised to get up slowly, sit on the side of the bed.    - Cortisol 10/25 11.2, normal    Depression - home wellbutrin  GERD - home PPI    Code status: FULL  DVT Ppx - SCD         PENDING TEST RESULTS:   At the time of discharge the following test results are still pending: None    FOLLOW UP APPOINTMENTS:    Follow-up Information     Follow up With Specialties Details Why Contact Info    Pillo Sanchez MD Internal Medicine In 2 weeks Hospital follow up  [address] [facility] [address]  [phone]             ADDITIONAL CARE RECOMMENDATIONS:   1. Please take all medications as prescribed. Note changes as below. Important not to take over 3grams of tylenol per day. 2. Please make sure to follow up with your primary care physician within 1-2 weeks of discharge for hospital follow up  3. Please make continue to monitor for signs of low blood pressure, including dizziness, lightheadedness, and diaphoresis  4. You came into the hospital after a fall, and was found to have an acute fracture. You will be treated with pain medications, physical therapy and rehab. 5. Please make sure to get up slowly from seated position. You should allow yourself about 5 min on the edge of the bed before getting up. 6. Midodrine should be HELD if your blood pressure is above or equal to 048 systolic. DIET: Regular Diet    ACTIVITY: PT/OT Eval and Treat    WOUND CARE: None    EQUIPMENT needed: Per PT/OT      DISCHARGE MEDICATIONS:  Current Discharge Medication List      START taking these medications    Details   acetaminophen (TYLENOL) 325 mg tablet Take 2 Tabs by mouth every six (6) hours as needed. Patient not to take over 3grams per day of tylenol (including Norco)  Qty: 30 Tab, Refills: 0      docusate sodium (COLACE) 100 mg capsule Take 1 Cap by mouth every twelve (12) hours for 90 days. Qty: 60 Cap, Refills: 2      HYDROcodone-acetaminophen (NORCO) 5-325 mg per tablet Take 1 Tab by mouth every four (4) hours as needed. Max Daily Amount: 6 Tabs. Qty: 30 Tab, Refills: 0    Associated Diagnoses: Nondisplaced fracture of greater trochanter of left femur, initial encounter for closed fracture (HCC)      midodrine (PROAMITINE) 2.5 mg tablet Take 1 Tab by mouth two (2) times daily (with meals) for 30 days. Qty: 60 Tab, Refills: 0      polyethylene glycol (MIRALAX) 17 gram packet Take 1 Packet by mouth daily for 30 days.   Qty: 30 Packet, Refills: 0         CONTINUE these medications which have NOT CHANGED    Details   aspirin delayed-release 81 mg tablet Take 81 mg by mouth daily. lansoprazole (PREVACID) 15 mg disintegrating tablet Take 15 mg by mouth daily (before breakfast). buPROPion SR (WELLBUTRIN SR) 150 mg SR tablet Take 150 mg by mouth daily. NOTIFY YOUR PHYSICIAN FOR ANY OF THE FOLLOWING:   Fever over 101 degrees for 24 hours. Chest pain, shortness of breath, fever, chills, nausea, vomiting, diarrhea, change in mentation, falling, weakness, bleeding. Severe pain or pain not relieved by medications. Or, any other signs or symptoms that you may have questions about. DISPOSITION:    Home With:   OT  PT  HH  RN      X Long term SNF/Inpatient Rehab    Independent/assisted living    Hospice    Other:       PATIENT CONDITION AT DISCHARGE:     Functional status    Poor    X Deconditioned     Independent      Cognition   X  Lucid     Forgetful     Dementia      Catheters/lines (plus indication)    Santos     PICC     PEG    X None      Code status    X Full code     DNR      PHYSICAL EXAMINATION AT DISCHARGE  Visit Vitals  /70 (BP 1 Location: Right arm, BP Patient Position: At rest)   Pulse 75   Temp 97.9 °F (36.6 °C)   Resp 16   SpO2 91%   Breastfeeding? No                                                     Constitutional:  No acute distress, laying in bed   ENT:  Oral mucous moist,    Resp:  CTA bilaterally. CV:  Regular rhythm, normal rate,     GI:  Soft, non distended, non tender bs+    Musculoskeletal:  No edema, warm, 2+ pulses throughout. Tenderness over the L upper thigh, able to move LLE    Neurologic:  Moves all extremities.   AAOx3, CN II-XII reviewed                         Psych:  Good insight, Not anxious nor agitated.      :     CHRONIC MEDICAL DIAGNOSES:  Problem List as of 10/25/2018 Date Reviewed: 10/22/2018          Codes Class Noted - Resolved    Depression ICD-10-CM: F32.9  ICD-9-CM: 790  10/25/2018 - Present        Acute hip pain, left ICD-10-CM: M25.552  ICD-9-CM: 719.45  10/22/2018 - Present        * (Principal) Fracture of greater trochanter of left femur [facility]) ICD-10-CM: X15.112U  ICD-9-CM: 820.20  10/22/2018 - Present              Greater than 30 minutes were spent with the patient on counseling and coordination of care    Signed:   Carlota Shepherd MD  10/25/2018  8:31 AM

## 2018-10-25 NOTE — PROGRESS NOTES
Problem: Falls - Risk of 
Goal: *Absence of Falls Document Belia Pimentel Fall Risk and appropriate interventions in the flowsheet. Outcome: Progressing Towards Goal 
Fall Risk Interventions: 
Mobility Interventions: OT consult for ADLs, Patient to call before getting OOB, PT Consult for mobility concerns, PT Consult for assist device competence Medication Interventions: Patient to call before getting OOB, Teach patient to arise slowly Elimination Interventions: Call light in reach, Patient to call for help with toileting needs, Toilet paper/wipes in reach, Toileting schedule/hourly rounds History of Falls Interventions: Room close to nurse's station

## 2018-10-25 NOTE — PROGRESS NOTES
ORTHO PROGRESS NOTE 2018 Admit Date:  
10/21/2018 SUBJECTIVE: 
   
Enrique Alejandre left hip pain at rest but feels discomfort with movement of leg. Still lightheaded with transfer/amb. OBJECTIVE: 
   
Physical Exam: 
Temp (24hrs), Av.9 °F (36.6 °C), Min:97.6 °F (36.4 °C), Max:98.1 °F (36.7 °C) Patient Vitals for the past 8 hrs: 
 BP Temp Pulse Resp SpO2  
10/25/18 0903 129/73  82    
10/25/18 0901 135/75  82    
10/25/18 0859 142/65  65    
10/25/18 0802 137/70 97.9 °F (36.6 °C) 75 16 91 % 10/25/18 0729 140/73  76    
10/25/18 0227 146/68 98.1 °F (36.7 °C) 74 16 95 % General: Alert, cooperative, no distress. Sitting in chair. Respiratory: Respirations unlabored Neurological:  SILT Musculoskeletal: Moves ankles well. Calves soft, non-tender. LAB:   
Recent Results (from the past 24 hour(s)) CORTISOL, AM  
 Collection Time: 10/24/18 10:46 AM  
Result Value Ref Range Cortisol, a.m. 11.3 4.30 - 22.45 ug/dL PT/OT:  
Gait:  Gait Base of Support: Shift to left Speed/Anusha: Pace decreased (<100 feet/min), Slow Step Length: Left shortened, Right shortened Swing Pattern: Left asymmetrical 
Stance: Left decreased Gait Abnormalities: Antalgic, Decreased step clearance, Step to gait(difficulty maintaining PWB) Ambulation - Level of Assistance: Minimal assistance, Assist x1 Distance (ft): 5 Feet (ft)(x 2) Assistive Device: Gait belt, Walker, rolling Interventions: Safety awareness training, Tactile cues, Verbal cues, Visual/Demos Interventions: Safety awareness training, Tactile cues, Verbal cues, Visual/Demos ASSESSMENT & PLAN: 
   
Principal Problem: 
  Fracture of greater trochanter of left femur ([address] (10/22/2018) Active Problems: 
  Acute hip pain, left (10/22/2018) Depression (10/25/2018) Plan: 
Medical team working up orthostatic issues DVT proph: asa 81 Qday. PT/OT PWB left Norco prn pain. Rehab when accepted. Dr. Mardel Nageotte aware and in agreement.  
 
TITI Banegas

## 2018-10-25 NOTE — PROGRESS NOTES
Chart reviewed. Noted discharge orders. Plan is for discharge to [facility] today. Spoke with Agustina Cotter with [facility], confirmed bed is available today. CM requested stretcher transport with [facility]. Completed CM portion of [place]. [facility] will transport patient via stretcher to [facility] at 2:00 PM. Patient/facility aware. Discharge folder located on hard chart to include ambulance form and discharge instructions. RN to follow with RIGOBERTO Tapia, and Hyun. RN to call report to #[phone]. Maryjane Castellano

## 2021-10-14 ENCOUNTER — HOSPITAL ENCOUNTER (INPATIENT)
Age: 81
LOS: 9 days | Discharge: NURSING, REHAB OR LONG-TERM CARE | DRG: 035 | End: 2021-10-23
Attending: EMERGENCY MEDICINE | Admitting: FAMILY MEDICINE
Payer: MEDICARE

## 2021-10-14 ENCOUNTER — ANESTHESIA EVENT (OUTPATIENT)
Dept: INTERVENTIONAL RADIOLOGY/VASCULAR | Age: 81
DRG: 035 | End: 2021-10-14
Payer: MEDICARE

## 2021-10-14 ENCOUNTER — APPOINTMENT (OUTPATIENT)
Dept: CT IMAGING | Age: 81
DRG: 035 | End: 2021-10-14
Attending: EMERGENCY MEDICINE
Payer: MEDICARE

## 2021-10-14 ENCOUNTER — HOSPITAL ENCOUNTER (OUTPATIENT)
Dept: INTERVENTIONAL RADIOLOGY/VASCULAR | Age: 81
DRG: 035 | End: 2021-10-14
Attending: STUDENT IN AN ORGANIZED HEALTH CARE EDUCATION/TRAINING PROGRAM
Payer: MEDICARE

## 2021-10-14 ENCOUNTER — APPOINTMENT (OUTPATIENT)
Dept: MRI IMAGING | Age: 81
DRG: 035 | End: 2021-10-14
Attending: NURSE PRACTITIONER
Payer: MEDICARE

## 2021-10-14 ENCOUNTER — ANESTHESIA (OUTPATIENT)
Dept: INTERVENTIONAL RADIOLOGY/VASCULAR | Age: 81
DRG: 035 | End: 2021-10-14
Payer: MEDICARE

## 2021-10-14 DIAGNOSIS — I63.9 CEREBROVASCULAR ACCIDENT (CVA), UNSPECIFIED MECHANISM (HCC): Primary | ICD-10-CM

## 2021-10-14 DIAGNOSIS — I63.312 CEREBROVASCULAR ACCIDENT (CVA) DUE TO THROMBOSIS OF LEFT MIDDLE CEREBRAL ARTERY (HCC): ICD-10-CM

## 2021-10-14 DIAGNOSIS — I95.1 ORTHOSTATIC HYPOTENSION: ICD-10-CM

## 2021-10-14 DIAGNOSIS — I63.232 ACUTE CEREBROVASCULAR ACCIDENT (CVA) DUE TO STENOSIS OF LEFT CAROTID ARTERY (HCC): ICD-10-CM

## 2021-10-14 LAB
ALBUMIN SERPL-MCNC: 3.2 G/DL (ref 3.5–5)
ALBUMIN/GLOB SERPL: 0.8 {RATIO} (ref 1.1–2.2)
ALP SERPL-CCNC: 135 U/L (ref 45–117)
ALT SERPL-CCNC: 17 U/L (ref 12–78)
ANION GAP SERPL CALC-SCNC: 3 MMOL/L (ref 5–15)
ASPIRIN TEST, ASPIRN: 396 ARU
AST SERPL-CCNC: 12 U/L (ref 15–37)
ATRIAL RATE: 73 BPM
ATRIAL RATE: 75 BPM
BASOPHILS # BLD: 0.1 K/UL (ref 0–0.1)
BASOPHILS NFR BLD: 1 % (ref 0–1)
BILIRUB SERPL-MCNC: 0.3 MG/DL (ref 0.2–1)
BUN SERPL-MCNC: 17 MG/DL (ref 6–20)
BUN/CREAT SERPL: 14 (ref 12–20)
CALCIUM SERPL-MCNC: 9.1 MG/DL (ref 8.5–10.1)
CALCULATED P AXIS, ECG09: 62 DEGREES
CALCULATED P AXIS, ECG09: 69 DEGREES
CALCULATED R AXIS, ECG10: 33 DEGREES
CALCULATED R AXIS, ECG10: 36 DEGREES
CALCULATED T AXIS, ECG11: 55 DEGREES
CALCULATED T AXIS, ECG11: 60 DEGREES
CHLORIDE SERPL-SCNC: 110 MMOL/L (ref 97–108)
CHOLEST SERPL-MCNC: 201 MG/DL
CO2 SERPL-SCNC: 27 MMOL/L (ref 21–32)
COMMENT, HOLDF: NORMAL
CREAT SERPL-MCNC: 1.22 MG/DL (ref 0.55–1.02)
DIAGNOSIS, 93000: NORMAL
DIAGNOSIS, 93000: NORMAL
DIFFERENTIAL METHOD BLD: ABNORMAL
EOSINOPHIL # BLD: 0.7 K/UL (ref 0–0.4)
EOSINOPHIL NFR BLD: 8 % (ref 0–7)
ERYTHROCYTE [DISTWIDTH] IN BLOOD BY AUTOMATED COUNT: 13 % (ref 11.5–14.5)
EST. AVERAGE GLUCOSE BLD GHB EST-MCNC: 114 MG/DL
GLOBULIN SER CALC-MCNC: 4.1 G/DL (ref 2–4)
GLUCOSE SERPL-MCNC: 112 MG/DL (ref 65–100)
HBA1C MFR BLD: 5.6 % (ref 4–5.6)
HCT VFR BLD AUTO: 39.5 % (ref 35–47)
HDLC SERPL-MCNC: 76 MG/DL
HDLC SERPL: 2.6 {RATIO} (ref 0–5)
HGB BLD-MCNC: 12.9 G/DL (ref 11.5–16)
IMM GRANULOCYTES # BLD AUTO: 0 K/UL (ref 0–0.04)
IMM GRANULOCYTES NFR BLD AUTO: 0 % (ref 0–0.5)
LDLC SERPL CALC-MCNC: 93.8 MG/DL (ref 0–100)
LYMPHOCYTES # BLD: 2.1 K/UL (ref 0.8–3.5)
LYMPHOCYTES NFR BLD: 24 % (ref 12–49)
MCH RBC QN AUTO: 29.6 PG (ref 26–34)
MCHC RBC AUTO-ENTMCNC: 32.7 G/DL (ref 30–36.5)
MCV RBC AUTO: 90.6 FL (ref 80–99)
MONOCYTES # BLD: 0.7 K/UL (ref 0–1)
MONOCYTES NFR BLD: 8 % (ref 5–13)
NEUTS SEG # BLD: 5.2 K/UL (ref 1.8–8)
NEUTS SEG NFR BLD: 59 % (ref 32–75)
NRBC # BLD: 0 K/UL (ref 0–0.01)
NRBC BLD-RTO: 0 PER 100 WBC
P-R INTERVAL, ECG05: 158 MS
P-R INTERVAL, ECG05: 164 MS
P2Y12 PLT RESPONSE,PPPR: 192 PRU (ref 194–418)
P2Y12 PLT RESPONSE,PPPR: 208 PRU (ref 194–418)
PLATELET # BLD AUTO: 356 K/UL (ref 150–400)
PMV BLD AUTO: 10.4 FL (ref 8.9–12.9)
POTASSIUM SERPL-SCNC: 3.6 MMOL/L (ref 3.5–5.1)
PROT SERPL-MCNC: 7.3 G/DL (ref 6.4–8.2)
Q-T INTERVAL, ECG07: 378 MS
Q-T INTERVAL, ECG07: 380 MS
QRS DURATION, ECG06: 72 MS
QRS DURATION, ECG06: 74 MS
QTC CALCULATION (BEZET), ECG08: 418 MS
QTC CALCULATION (BEZET), ECG08: 422 MS
RBC # BLD AUTO: 4.36 M/UL (ref 3.8–5.2)
SAMPLES BEING HELD,HOLD: NORMAL
SODIUM SERPL-SCNC: 140 MMOL/L (ref 136–145)
TRIGL SERPL-MCNC: 156 MG/DL (ref ?–150)
TROPONIN-HIGH SENSITIVITY: 6 NG/L (ref 0–51)
VENTRICULAR RATE, ECG03: 73 BPM
VENTRICULAR RATE, ECG03: 75 BPM
VLDLC SERPL CALC-MCNC: 31.2 MG/DL
WBC # BLD AUTO: 8.7 K/UL (ref 3.6–11)

## 2021-10-14 PROCEDURE — 70551 MRI BRAIN STEM W/O DYE: CPT

## 2021-10-14 PROCEDURE — 93005 ELECTROCARDIOGRAM TRACING: CPT

## 2021-10-14 PROCEDURE — 65660000000 HC RM CCU STEPDOWN

## 2021-10-14 PROCEDURE — 36415 COLL VENOUS BLD VENIPUNCTURE: CPT

## 2021-10-14 PROCEDURE — 85576 BLOOD PLATELET AGGREGATION: CPT

## 2021-10-14 PROCEDURE — 80053 COMPREHEN METABOLIC PANEL: CPT

## 2021-10-14 PROCEDURE — 74011000636 HC RX REV CODE- 636: Performed by: RADIOLOGY

## 2021-10-14 PROCEDURE — 0042T CT CODE NEURO PERF W CBF: CPT

## 2021-10-14 PROCEDURE — 70496 CT ANGIOGRAPHY HEAD: CPT

## 2021-10-14 PROCEDURE — 74011250636 HC RX REV CODE- 250/636: Performed by: NURSE PRACTITIONER

## 2021-10-14 PROCEDURE — 74011250637 HC RX REV CODE- 250/637: Performed by: EMERGENCY MEDICINE

## 2021-10-14 PROCEDURE — 84484 ASSAY OF TROPONIN QUANT: CPT

## 2021-10-14 PROCEDURE — 99285 EMERGENCY DEPT VISIT HI MDM: CPT

## 2021-10-14 PROCEDURE — 85025 COMPLETE CBC W/AUTO DIFF WBC: CPT

## 2021-10-14 PROCEDURE — 83036 HEMOGLOBIN GLYCOSYLATED A1C: CPT

## 2021-10-14 PROCEDURE — 80061 LIPID PANEL: CPT

## 2021-10-14 PROCEDURE — 70450 CT HEAD/BRAIN W/O DYE: CPT

## 2021-10-14 PROCEDURE — 4A03X5D MEASUREMENT OF ARTERIAL FLOW, INTRACRANIAL, EXTERNAL APPROACH: ICD-10-PCS | Performed by: RADIOLOGY

## 2021-10-14 PROCEDURE — 99223 1ST HOSP IP/OBS HIGH 75: CPT | Performed by: PSYCHIATRY & NEUROLOGY

## 2021-10-14 PROCEDURE — 74011250637 HC RX REV CODE- 250/637: Performed by: NURSE PRACTITIONER

## 2021-10-14 RX ORDER — BUPROPION HYDROCHLORIDE 300 MG/1
300 TABLET ORAL DAILY
COMMUNITY
End: 2021-12-03

## 2021-10-14 RX ORDER — PANTOPRAZOLE SODIUM 40 MG/1
40 TABLET, DELAYED RELEASE ORAL
Status: DISCONTINUED | OUTPATIENT
Start: 2021-10-15 | End: 2021-10-16

## 2021-10-14 RX ORDER — CLOPIDOGREL BISULFATE 75 MG/1
75 TABLET ORAL DAILY
Status: DISCONTINUED | OUTPATIENT
Start: 2021-10-15 | End: 2021-10-15

## 2021-10-14 RX ORDER — SODIUM CHLORIDE 9 MG/ML
100 INJECTION, SOLUTION INTRAVENOUS CONTINUOUS
Status: DISPENSED | OUTPATIENT
Start: 2021-10-14 | End: 2021-10-15

## 2021-10-14 RX ORDER — GUAIFENESIN 100 MG/5ML
325 LIQUID (ML) ORAL
Status: COMPLETED | OUTPATIENT
Start: 2021-10-14 | End: 2021-10-14

## 2021-10-14 RX ORDER — ACETAMINOPHEN 325 MG/1
650 TABLET ORAL
Status: DISCONTINUED | OUTPATIENT
Start: 2021-10-14 | End: 2021-10-16

## 2021-10-14 RX ORDER — ASPIRIN 325 MG
325 TABLET ORAL DAILY
Status: DISCONTINUED | OUTPATIENT
Start: 2021-10-15 | End: 2021-10-14

## 2021-10-14 RX ORDER — ASPIRIN 325 MG
325 TABLET ORAL ONCE
Status: DISCONTINUED | OUTPATIENT
Start: 2021-10-14 | End: 2021-10-14

## 2021-10-14 RX ORDER — ATORVASTATIN CALCIUM 40 MG/1
80 TABLET, FILM COATED ORAL
Status: DISCONTINUED | OUTPATIENT
Start: 2021-10-14 | End: 2021-10-14

## 2021-10-14 RX ORDER — ACETAMINOPHEN 650 MG/1
650 SUPPOSITORY RECTAL
Status: DISCONTINUED | OUTPATIENT
Start: 2021-10-14 | End: 2021-10-16

## 2021-10-14 RX ORDER — BUPROPION HYDROCHLORIDE 150 MG/1
150 TABLET, EXTENDED RELEASE ORAL EVERY 12 HOURS
Status: DISCONTINUED | OUTPATIENT
Start: 2021-10-14 | End: 2021-10-16

## 2021-10-14 RX ORDER — EVOLOCUMAB 140 MG/ML
140 INJECTION, SOLUTION SUBCUTANEOUS
Status: ON HOLD | COMMUNITY
End: 2021-10-16

## 2021-10-14 RX ORDER — CLOPIDOGREL BISULFATE 75 MG/1
300 TABLET ORAL
Status: DISCONTINUED | OUTPATIENT
Start: 2021-10-14 | End: 2021-10-14

## 2021-10-14 RX ORDER — GUAIFENESIN 100 MG/5ML
81 LIQUID (ML) ORAL DAILY
Status: DISCONTINUED | OUTPATIENT
Start: 2021-10-15 | End: 2021-10-24 | Stop reason: HOSPADM

## 2021-10-14 RX ORDER — ATORVASTATIN CALCIUM 40 MG/1
40 TABLET, FILM COATED ORAL
Status: DISCONTINUED | OUTPATIENT
Start: 2021-10-14 | End: 2021-10-15

## 2021-10-14 RX ORDER — ADHESIVE BANDAGE
30 BANDAGE TOPICAL DAILY PRN
Status: DISCONTINUED | OUTPATIENT
Start: 2021-10-14 | End: 2021-10-24 | Stop reason: HOSPADM

## 2021-10-14 RX ORDER — CLOPIDOGREL 300 MG/1
300 TABLET, FILM COATED ORAL
Status: COMPLETED | OUTPATIENT
Start: 2021-10-14 | End: 2021-10-14

## 2021-10-14 RX ORDER — CLOPIDOGREL BISULFATE 75 MG/1
75 TABLET ORAL DAILY
Status: DISCONTINUED | OUTPATIENT
Start: 2021-10-15 | End: 2021-10-14

## 2021-10-14 RX ADMIN — ASPIRIN 81 MG CHEWABLE TABLET 324 MG: 81 TABLET CHEWABLE at 10:22

## 2021-10-14 RX ADMIN — CLOPIDOGREL BISULFATE 300 MG: 300 TABLET, FILM COATED ORAL at 10:24

## 2021-10-14 RX ADMIN — SODIUM CHLORIDE 75 ML/HR: 9 INJECTION, SOLUTION INTRAVENOUS at 14:32

## 2021-10-14 RX ADMIN — IOPAMIDOL 100 ML: 755 INJECTION, SOLUTION INTRAVENOUS at 08:51

## 2021-10-14 RX ADMIN — BUPROPION HYDROCHLORIDE 150 MG: 150 TABLET, EXTENDED RELEASE ORAL at 12:20

## 2021-10-14 RX ADMIN — ACETAMINOPHEN 650 MG: 325 TABLET ORAL at 12:20

## 2021-10-14 RX ADMIN — IOPAMIDOL 20 ML: 755 INJECTION, SOLUTION INTRAVENOUS at 09:04

## 2021-10-14 NOTE — PROGRESS NOTES
Speech Therapy Contact Note    Orders received and appreciated. Chart reviewed. Patient admitted today with R sided weakness, slurred speech. 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 Pt passed STAND and has been initiated on regular diet. Therefore, will hold and follow up tomorrow to complete full speech/language and swallowing evaluation until post-procedure.     Thank you,   Maurizio Covington M.S. CF-SLP   Speech Language Pathologist

## 2021-10-14 NOTE — CONSULTS
Neurointerventional Surgery Consult    Patient: Sung Allen MRN: [id number]  SSN: [id number]    YOB: 1940  Age: [de-identified] y.o. Sex: female      History of Presenting Illness:      Sung Allen is a [de-identified] y.o. female with a past medical history of GERD, HLD, hypertension and depression who presented to the emergency department via EMS with slurred speech and right-sided weakness. She is unsure of time of onset and thinks she may have woken up this way however did not notice slurred speech until speaking with her daughter on the phone this morning at 715. She went to bed normal last night. She was deemed not a candidate for TPA by teleneurology due to unknown time of onset. The patient reports that she takes a baby aspirin every day along with Wellbutrin Prevacid. The patient reports that she is prescribed a medication for hypertension but cannot tell me the name and states she only takes one half of the pill. The patient also reports a recent appointment with an outpatient neurologist at [facility] for evaluation of right leg weakness which she states has been getting progressively worse over the past several years and causing her to have multiple falls. She reports having a brain MRI and being told that she has \"ischemia\" but is unsure where and what diagnosis is. The patient's daughter is going to bring in CD of MRI from [facility]. CTA head and neck reviewed reveals severe 80 to 90% stenosis of the left proximal internal carotid artery, MRI reveals multiple punctate embolic strokes in the left posterior superior temporal/parietal cortex, left parietal/occipital junction and left anterior frontal subcortical region. 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 Currently at time of my exam the patient endorses difficulty with speech, right facial droop, right arm and leg weakness, dysmetria of right hand.   Specifically she denies headache, dizziness , Chest pain, shortness of breath, visual disturbance. She states that she chronically has gait imbalance with right leg weakness.          Past Medical History:   Diagnosis Date    GERD (gastroesophageal reflux disease)     Hiatal hernia     Hypertension     Nausea & vomiting     Unspecified adverse effect of anesthesia     prolonged sedation     Past Surgical History:   Procedure Laterality Date    HX APPENDECTOMY  child, open    HX CHOLECYSTECTOMY  2012    HX ORTHOPAEDIC  2009    lumbar gonzalez.      Family History   Problem Relation Age of Onset    Hypertension Mother     Heart Disease Mother     Cancer Mother     Hypertension Father      Social History     Tobacco Use    Smoking status: Former Smoker     Packs/day: 1.00     Quit date: 2011     Years since quittin.8   Substance Use Topics    Alcohol use: Not on file     Comment: 2 drinks per month      Current Facility-Administered Medications   Medication Dose Route Frequency Provider Last Rate Last Admin    acetaminophen (TYLENOL) tablet 650 mg  650 mg Oral Q4H PRN Leno Lopez NP   650 mg at 10/14/21 1220    Or    acetaminophen (TYLENOL) solution 650 mg  650 mg Per NG tube Q4H PRN Renetta Kraus NP        Or    acetaminophen (TYLENOL) suppository 650 mg  650 mg Rectal Q4H PRN Renetta Kraus NP        0.9% sodium chloride infusion  75 mL/hr IntraVENous CONTINUOUS Leno Lopez NP 75 mL/hr at 10/14/21 1432 75 mL/hr at 10/14/21 1432    magnesium hydroxide (MILK OF MAGNESIA) 400 mg/5 mL oral suspension 30 mL  30 mL Oral DAILY PRN Leno Lopez NP        buPROPion SR [facility] SR) tablet 150 mg  150 mg Oral Q12H Gerri Kraus NP   150 mg at 10/14/21 1220    [START ON 10/15/2021] pantoprazole (PROTONIX) tablet 40 mg  40 mg Oral ACB Gerri Kraus NP        atorvastatin (LIPITOR) tablet 40 mg  40 mg Oral QHS Zora Gold MD            No Known Allergies    Review of Systems:  A comprehensive review of systems was negative except for that written in the History of Present Illness. Objective:     Vitals:    10/14/21 1205 10/14/21 1222 10/14/21 1357 10/14/21 1408   BP:  (!) 163/72 (!) 149/83    Pulse: 82 81 77 75   Resp:  16 13    Temp:  97.4 °F (36.3 °C) 97.7 °F (36.5 °C)    SpO2:  97% 96%    Height:  5' 4\" (1.626 m)          Physical Exam:  GENERAL: alert, cooperative, no distress, appears stated age  LUNG: clear to auscultation bilaterally  HEART: regular rate and rhythm, S1, S2 normal, no murmur, click, rub or gallop  ABDOMEN: soft, non-tender. Bowel sounds normal. No masses,  no organomegaly  EXTREMITIES:  extremities normal, atraumatic, no cyanosis or edema  SKIN: Normal.    Neurologic Exam:  Mental Status:  Alert and oriented x 3 ( unable to state month/date). Appropriate affect, mood                                       and behavior. Language:    Dysarthria, normal repetition, comprehension and naming. Cranial Nerves:   Pupils equal, round and reactive to light. Visual fields full to confrontation. Extraocular movements intact. Facial sensation intact V1 - V3. Right-sided facial asymmetry is present. Hearing intact bilaterally. Dysarthria present. Tongue protrudes to midline, palate elevates                                     symmetrically. Shoulder shrug 5/5 bilaterally. Motor:    Mild right arm pronator drift. Bulk and tone normal.      5/5 power in all extremities proximally and distally on the left                                       4+/5 power in extremities proximally and distally on the right     No involuntary movements. Sensation:    Sensation intact throughout to light touch, temperature    Coordination & Gait: Gait was deferred. Finger-to-nose and heel-to-shin is intact      Imaging:     All imaging reviewed by myself and Dr. Angelina Shirley Contrast:      IMPRESSION  Possible artifact left temporal lobe. Correlate clinically. Otherwise no  flow/perfusion abnormality demonstrated. CTA Head/Neck 10/14/21:     IMPRESSION  1. Severe 80-90% stenosis left proximal internal carotid artery. See above. 2. Mild/moderate stenosis origin left common carotid artery. 3. Approximately 20% stenosis proximal right internal carotid artery. 4. No definitive intraluminal filling defect or other CTA findings of large  vessel intracranial occlusion. 5. Asymmetric decreased size left MCA may be related to flow limitation from the  internal carotid artery stenosis.     MRI Brain WO Contrast 10/14/21:     IMPRESSION  1. Findings consistent with a degree of acute ischemic injury left parietal  lobe. See above descriptions. 2. Left sella/pituitary cystic mass. Incompletely evaluated. Depending on  clinical circumstance consider follow-up MRI with contrast and with attention to  the pituitary. Assessment:     Hospital Problems  Date Reviewed: 10/22/2018        Codes Class Noted POA    CVA (cerebral vascular accident) [facility]) ICD-10-CM: I63.9  ICD-9-CM: 434.91  10/14/2021 Unknown              Plan:     Hetal Estrada is an 81-year-old female who presents to the hospital with clinical stroke later confirmed by MRI. She is deemed not a candidate for TPA due to unknown time of onset. The patient has an 80 to 90% stenotic right ICA and punctate embolic strokes seen on MRI in the left parietal region primarily. She is maintained outpatient on baby aspirin and Repatha as well as one medication for hypertension that is unknown to this writer. Left thromboembolic ischemic infarct:  - Permissive hypertension, recommend not treating blood pressure unless 220/125  -Patient was started on Lipitor 40 mg this hospitalization however agree with neurology to resume Repatha outpatient  -Patient loaded with 300 mg of Plavix this morning as well as 325 mg of aspirin, aspirin assay reveals patient is therapeutic at 396.   Awaiting repeat P2 Y 12 test this evening  -Plans for endovascular stenting tomorrow with Dr. Vianney Rosas of the right ICA, patient will most likely need to recover in the ICU post procedure  -N.p.o. after midnight except meds  -Creatinine is currently 1.22 the GFR of 42, we do not have any previous laboratory values since 2018 for comparison, hydrate patient overnight with NS and recheck CMP in the morning  -Neurology has been consulted  -PT/OT/speech eval was ordered  -Will start DVT prophylaxis after procedure tomorrow    Plans for endovascular stenting including risks, benefits, and alternatives were discussed with the patient and her two daughters present along with Dr. Vianney Rosas. This procedure has been fully reviewed with the patient and written informed consent has been obtained. Thank you for this consult and participating in the care of this patient. I have discussed the diagnosis with the patient and the intended plan as seen in the above orders. Patient is in agreement.       Signed By: Sissy Soto NP     October 14, 2021

## 2021-10-14 NOTE — PROGRESS NOTES
Bedside and Verbal shift change report given to Lora Alvarez 70 (oncoming nurse) by Uche Jackman RN (offgoing nurse). Report included the following information SBAR, Kardex, Intake/Output and MAR.

## 2021-10-14 NOTE — PROGRESS NOTES
Spiritual Care Assessment/Progress Note  [facility]      NAME: Rah Treviño      MRN: [id number]  AGE: [de-identified] y.o.  SEX: female  Sikhism Affiliation: Non Buddhism   Language: English     10/14/2021     Total Time (in minutes): 29     Spiritual Assessment begun in [address] [facility] [address] DEP through conversation with:         []Patient        [x] Family    [] Friend(s)        Reason for Consult: Crisis (STROKE)     Spiritual beliefs: (Please include comment if needed)     [x] Identifies with a jo tradition:         [] Supported by a jo community:            [] Claims no spiritual orientation:           [] Seeking spiritual identity:                [] Adheres to an individual form of spirituality:           [] Not able to assess:                           Identified resources for coping:      [] Prayer                               [] Music                  [] Guided Imagery     [x] Family/friends                 [] Pet visits     [] Devotional reading                         [] Unknown     [] Other:                                             Interventions offered during this visit: (See comments for more details)          Family/Friend(s): Initial Assessment, Affirmation of emotions/emotional suffering, Affirmation of jo, Normalization of emotional/spiritual concerns, Coping skills reviewed/reinforced     Plan of Care:     [] Support spiritual and/or cultural needs    [] Support AMD and/or advance care planning process      [] Support grieving process   [] Coordinate Rites and/or Rituals    [] Coordination with community clergy   [] No spiritual needs identified at this time   [] Detailed Plan of Care below (See Comments)  [] Make referral to Music Therapy  [] Make referral to Pet Therapy     [] Make referral to Addiction services  [] Make referral to [facility]  [] Make referral to Spiritual Care Partner  [] No future visits requested        [] Follow up upon further referrals     Comments: Code Stroke in ED 13.  Patients' daughter Kiarra Saldana was present at bedside and other daughter-Florecita (nurse @ [facility]) and [facility] were in the ER waiting area. Assessment conducted with Noel Bruno. Kiarra Saldana shared about her mother's spiritual beliefs as Catholic. Provided empathic listening, explored spiritual and emotional needs, and cultivated relationship of care and support. Ngozivalerie Espinoza of  availability. Advised nurse to contact [facility] for any further referrals. Visited by: Rae Aguilera.  Melba Jorge, [address] [place] [address] Road paging Service [phone] ([phone])

## 2021-10-14 NOTE — ACP (ADVANCE CARE PLANNING)
Advance Care Planning     Advance Care Planning (ACP) Physician/NP/PA Conversation      Date of Conversation: 10/14/2021  Conducted with: Patient with Decision Making Capacity    Healthcare Decision Maker:     Primary Decision Maker: Mohan Cherry - Albar[place] - [phone]  Click here to complete [address] including selection of the Healthcare Decision Maker Relationship (ie \"Primary\")      Today we documented Decision Maker(s) consistent with Legal Next of Kin hierarchy. Care Preferences:    Hospitalization: \"If your health worsens and it becomes clear that your chance of recovery is unlikely, what would be your preference regarding hospitalization? \"  The patient would prefer comfort-focused treatment without hospitalization. Ventilation: \"If you were unable to breathe on your own and your chance of recovery was unlikely, what would be your preference about the use of a ventilator (breathing machine) if it was available to you? \"   The patient would NOT desire the use of a ventilator. Resuscitation: \"In the event your heart stopped as a result of an underlying serious health condition, would you want attempts to be made to restart your heart, or would you prefer a natural death? \"   Yes, attempt to resuscitate.     Additional topics discussed: treatment goals     Diagnosis: CVA    Conversation Outcomes / Follow-Up Plan:   ACP complete - no further action today  Reviewed DNR/DNI and patient; Partial. No intubation   Length of Voluntary ACP Conversation in minutes:  16 minutes    Noah Navarrete NP

## 2021-10-14 NOTE — PROGRESS NOTES
Problem: Pain  Goal: *Control of Pain  Outcome: Progressing Towards Goal  Goal: *PALLIATIVE CARE:  Alleviation of Pain  Outcome: Progressing Towards Goal     Problem: Patient Education: Go to Patient Education Activity  Goal: Patient/Family Education  Outcome: Progressing Towards Goal     Problem: Patient Education: Go to Patient Education Activity  Goal: Patient/Family Education  Outcome: Progressing Towards Goal     Problem: TIA/CVA Stroke: 0-24 hours  Goal: Off Pathway (Use only if patient is Off Pathway)  Outcome: Progressing Towards Goal  Goal: Activity/Safety  Outcome: Progressing Towards Goal  Goal: Consults, if ordered  Outcome: Progressing Towards Goal  Goal: Diagnostic Test/Procedures  Outcome: Progressing Towards Goal  Goal: Nutrition/Diet  Outcome: Progressing Towards Goal  Goal: Discharge Planning  Outcome: Progressing Towards Goal  Goal: Medications  Outcome: Progressing Towards Goal  Goal: Respiratory  Outcome: Progressing Towards Goal  Goal: Treatments/Interventions/Procedures  Outcome: Progressing Towards Goal  Goal: Minimize risk of bleeding post-thrombolytic infusion  Outcome: Progressing Towards Goal  Goal: Monitor for complications post-thrombolytic infusion  Outcome: Progressing Towards Goal  Goal: Psychosocial  Outcome: Progressing Towards Goal  Goal: *Hemodynamically stable  Outcome: Progressing Towards Goal  Goal: *Neurologically stable  Description: Absence of additional neurological deficits    Outcome: Progressing Towards Goal  Goal: *Verbalizes anxiety and depression are reduced or absent  Outcome: Progressing Towards Goal  Goal: *Absence of Signs of Aspiration on Current Diet  Outcome: Progressing Towards Goal  Goal: *Absence of deep venous thrombosis signs and symptoms(Stroke Metric)  Outcome: Progressing Towards Goal  Goal: *Ability to perform ADLs and demonstrates progressive mobility and function  Outcome: Progressing Towards Goal  Goal: *Stroke education started(Stroke Metric)  Outcome: Progressing Towards Goal  Goal: *Dysphagia screen performed(Stroke Metric)  Outcome: Progressing Towards Goal  Goal: *Rehab consulted(Stroke Metric)  Outcome: Progressing Towards Goal     Problem: Ischemic Stroke: Discharge Outcomes  Goal: *Verbalizes anxiety and depression are reduced or absent  Outcome: Progressing Towards Goal  Goal: *Verbalize understanding of risk factor modification(Stroke Metric)  Outcome: Progressing Towards Goal  Goal: *Hemodynamically stable  Outcome: Progressing Towards Goal  Goal: *Absence of aspiration pneumonia  Outcome: Progressing Towards Goal  Goal: *Aware of needed dietary changes  Outcome: Progressing Towards Goal  Goal: *Verbalize understanding of prescribed medications including anti-coagulants, anti-lipid, and/or anti-platelets(Stroke Metric)  Outcome: Progressing Towards Goal  Goal: *Tolerating diet  Outcome: Progressing Towards Goal  Goal: *Aware of follow-up diagnostics related to anticoagulants  Outcome: Progressing Towards Goal  Goal: *Ability to perform ADLs and demonstrates progressive mobility and function  Outcome: Progressing Towards Goal  Goal: *Absence of DVT(Stroke Metric)  Outcome: Progressing Towards Goal  Goal: *Absence of aspiration  Outcome: Progressing Towards Goal  Goal: *Optimal pain control at patient's stated goal  Outcome: Progressing Towards Goal  Goal: *Home safety concerns addressed  Outcome: Progressing Towards Goal  Goal: *Describes available resources and support systems  Outcome: Progressing Towards Goal  Goal: *Verbalizes understanding of activation of EMS(911) for stroke symptoms(Stroke Metric)  Outcome: Progressing Towards Goal  Goal: *Understands and describes signs and symptoms to report to providers(Stroke Metric)  Outcome: Progressing Towards Goal  Goal: *Neurolgocially stable (absence of additional neurological deficits)  Outcome: Progressing Towards Goal  Goal: *Verbalizes importance of follow-up with primary care physician(Stroke Metric)  Outcome: Progressing Towards Goal  Goal: *Smoking cessation discussed,if applicable(Stroke Metric)  Outcome: Progressing Towards Goal  Goal: *Depression screening completed(Stroke Metric)  Outcome: Progressing Towards Goal     Problem: Patient Education: Go to Patient Education Activity  Goal: Patient/Family Education  Outcome: Progressing Towards Goal     Problem: Pressure Injury - Risk of  Goal: *Prevention of pressure injury  Description: Document Robe Scale and appropriate interventions in the flowsheet.   Outcome: Progressing Towards Goal  Note: Pressure Injury Interventions:       Moisture Interventions: Minimize layers, Absorbent underpads, Limit adult briefs         Mobility Interventions: Pressure redistribution bed/mattress (bed type), PT/OT evaluation                          Problem: Patient Education: Go to Patient Education Activity  Goal: Patient/Family Education  Outcome: Progressing Towards Goal

## 2021-10-14 NOTE — ADVANCED PRACTICE NURSE
Neurocritical Care Code Stroke Documentation      Symptoms:   Right facial droop, right sided weakness, dysarthria    Last Known Well:  last night at bedtime    Medical hx: Active Problems:    * No active hospital problems. *  ** Of note patient reports that she has been having problems with her right leg dragging, weakness. Etiology is unclear. Anticoagulation: none   VAN:   Positive   NIHSS:   1a-LOC: 0    1b-Month/Age:1    1c-Open/Close Hand:0    2-Best Gaze:0    3-Visual Fields:0    4-Facial Palsy:2    5a-Left Arm:0    5b-Right Arm:1    6a-Left Le    6b-Right Le    7-Limb Ataxia:0    8-Sensory:0    9-Best Language:0    10-Dysarthria:2    11-Extinction/Inattention:0  TOTAL SCORE:7   Imaging:   CT- IMPRESSION  No acute process or change compared to the prior exam.    CTA- IMPRESSION  1. Severe 80-90% stenosis left proximal internal carotid artery. See above. 2. Mild/moderate stenosis origin left common carotid artery. 3. Approximately 20% stenosis proximal right internal carotid artery. 4. No definitive intraluminal filling defect or other CTA findings of large  vessel intracranial occlusion. 5. Asymmetric decreased size left MCA may be related to flow limitation from the  internal carotid artery stenosis.     Findings called to the ED M.D. by me. CTP   Plan:   TPA Candidate: NO    Mechanical thrombectomy Candidate: NO     Discussed with: ED Physician, ED Rn, Patient and her daughter     Arrival time: 200- arrival via EMS  Time spent: 20 minutes.      Renee Briggs NP  Neurocritical Care Nurse Practitioner  [phone]

## 2021-10-14 NOTE — PROGRESS NOTES
Occupational Therapy: hold    Orders received and chart reviewed. Patient admitted today with R sided weakness, slurred speech. Pt found to have Severe 80-90% stenosis left proximal internal carotid artery. Pt is to undergo stenting tomorrow. Will hold therapy evaluation until post-procedure.  Thank you    AJAY Kebede/MORGAN

## 2021-10-14 NOTE — H&P
[address] [facility] Adult  Hospitalist Group  History and Physical    Primary Care Provider: Geovany Camp MD  Date of Service:  10/14/2021    Subjective:     Aloysius Kayser is a [de-identified] y.o. female with a past medical history of GERD, HLD, HTN and depression who presented to the emergency department via ems with slurred speech and right-sided weakness. Level 1 Code stroke called. Hospitalist was consulted for admission. Upon examination, patient is awake, alert and oriented x4. Daughters at bedside. States that she was on the phone with her daughter this morning around 0715 when she developed slurred speech and right-sided weakness. Daughter states that she was unable to get her words out correctly. EMS was called and she was brought to the emergency department for further treatment. Reviewed medications with patient and daughters. Patient states she only takes Wellbutrin, Prevacid and aspirin. She was recently prescribed medication for hypertension and hyperlipidemia but does not recall the name of the medication and only takes half pill of the antihypertensive. In ED: Level 1 code stroke called. Evaluated by Neuro NP and teleneurology. CTA head/neck: Severe 80-90% stenosis left proximal internal carotid artery. NIS evaluated patient in ED. Review of Systems:    A comprehensive review of systems was negative except for that written in the History of Present Illness. Past Medical History:   Diagnosis Date    GERD (gastroesophageal reflux disease)     Hiatal hernia     Nausea & vomiting     Unspecified adverse effect of anesthesia 9-2009    prolonged sedation      Past Surgical History:   Procedure Laterality Date    HX APPENDECTOMY  child, open    HX CHOLECYSTECTOMY  2012    HX ORTHOPAEDIC  2009    lumbar gonzalez.     Prior to Admission medications    Medication Sig Start Date End Date Taking?  Authorizing Provider   acetaminophen (TYLENOL) 325 mg tablet Take 2 Tabs by mouth every six (6) hours as needed. Patient not to take over 3grams per day of tylenol (including Norco) 10/25/18   Tarri Lovely, Thelda Cabot, MD   HYDROcodone-acetaminophen [facility]) 5-325 mg per tablet Take 1 Tab by mouth every four (4) hours as needed. Max Daily Amount: 6 Tabs. Patient not taking: Reported on 10/14/2021 10/25/18   June MCFADDEN MD   aspirin delayed-release 81 mg tablet Take 81 mg by mouth daily. Other, MD Yesenia   lansoprazole (PREVACID) 15 mg disintegrating tablet Take 15 mg by mouth daily (before breakfast). Provider, Historical   buPROPion SR (WELLBUTRIN SR) 150 mg SR tablet Take 150 mg by mouth daily. 3/29/11   Provider, Historical     No Known Allergies   Family History   Problem Relation Age of Onset    Hypertension Mother     Heart Disease Mother     Cancer Mother     Hypertension Father         SOCIAL HISTORY:  Patient resides at Home. Patient ambulates with walker/cane. Smoking history: Quit smoking one year ago   Alcohol history: None         Objective:       Physical Exam:   Visit Vitals  BP (!) 165/91   Pulse 81   Temp 97.9 °F (36.6 °C)   Resp 17   SpO2 97%     General appearance: alert, cooperative, no distress, appears stated age  Lungs: clear to auscultation bilaterally  Heart: regular rate and rhythm, S1, S2 normal, no murmur, click, rub or gallop  Abdomen: soft, non-tender. Bowel sounds normal. No masses,  no organomegaly  Extremities: extremities normal, atraumatic, no cyanosis or edema  Pulses: 2+ and symmetric  Skin: Skin color, texture, turgor normal. No rashes or lesions  Neurologic: Grossly normal  Cap refill: Brisk, less than 3 seconds  Pulses: 2+, symmetric in all extremities    ECG:  normal EKG, normal sinus rhythm     Data Review: All diagnostic labs and studies have been reviewed.         Assessment:     Active Problems:    CVA (cerebral vascular accident) ([facility] [address] (10/14/2021)        Plan:     CVA   - Admit to Neuro   - CTA head/neck- Severe 80-90% stenosis left proximal internal carotid artery   - MRI and ECHO pending   - A1c and Lipid panel pending   - Nuerointerventional consulted- Stenting in AM   - Neurology consulted   - Speech/PT/OT consulted   - Start Atorvastatin 80mg   - Aspirin and Plavix- will defer to neurology   - Start NS @75ml/hr     Hypertension   - Allow for permissive HTN   - Patient states she was recently prescribed anti-hypertensives and has only been taking half dose. Does not know name.   - Hydralazine for SBP >180   - Monitor vital signs per unit routine     Elevated creatinine   - Creatinine 1.22  - IV fluids   - Monitor labs in am     GERD   - Continue PPI     Depression   - Supportive treatment   - Continue home dose wellbutrin     DVT prophylaxis: SCDs   Code status: Partial. No intubation   Next of Kin and Emergency Contact: Daughter Constance Musa [phone]     FUNCTIONAL STATUS PRIOR TO HOSPITALIZATION (including history of recent falls):    Signed By: Evette Mccord NP     October 14, 2021

## 2021-10-14 NOTE — CONSULTS
NEUROLOGY   INPATIENT EVALUATION/CONSULTATION       PATIENT NAME: Kaushal Mcmahan    MRN: [id number]    REASON FOR CONSULTATION: Dysarthria/aphasia, right hemiparesis    10/14/21      HISTORY OF PRESENT ILLNESS:  Kaushal Mcmahan is a [de-identified] y.o. right-hand-dominant female admitted to [facility] after she presented as a level 1 stroke alert. Patient went to bed in her usual state of health around 1 AM this morning upon awakening around 6:30 AM had trouble getting out of bed feelings of her right arm which is flopping around. She has noted some trouble with her speech and called her daughter at which point she really was not able to produce any intelligible speech. Daughter called 911 who then transported to [facility]. While here deficits were persistent though improving to degree, she underwent CT CTA which did not demonstrate any hemorrhage or developing ischemia though she was felt to not be a TPA candidate due to unknown time of onset. CTA demonstrated 89% proximal left carotid stenosis, stat MRI was obtained which demonstrated some degree of ischemic injury in the left parietal lobe predominantly. She was loaded with aspirin and Plavix and admitted to the floor with plans for stenting in the morning.     PAST MEDICAL HISTORY:  Past Medical History:   Diagnosis Date    GERD (gastroesophageal reflux disease)     Hiatal hernia     Hypertension     Nausea & vomiting     Unspecified adverse effect of anesthesia 9-2009    prolonged sedation       PAST SURGICAL HISTORY:  Past Surgical History:   Procedure Laterality Date    HX APPENDECTOMY  child, open    HX CHOLECYSTECTOMY  2012    HX ORTHOPAEDIC  2009    lumbar gonzalez.       FAMILY HISTORY:   Family History   Problem Relation Age of Onset    Hypertension Mother     Heart Disease Mother     Cancer Mother     Hypertension Father          SOCIAL HISTORY:  Social History     Socioeconomic History    Marital status:      Spouse name: Not on file    Number of children: Not on file    Years of education: Not on file    Highest education level: Not on file   Tobacco Use    Smoking status: Former Smoker     Packs/day: 1.00     Quit date: 2011     Years since quittin.8   Other Topics Concern     Social Determinants of Health     Financial Resource Strain:     Difficulty of Paying Living Expenses:    Food Insecurity:     Worried About Running Out of Food in the Last Year:     [address] in the Last Year:    Transportation Needs:     Lack of Transportation (Medical):      Lack of Transportation (Non-Medical):    Physical Activity:     Days of Exercise per Week:     Minutes of Exercise per Session:    Stress:     Feeling of Stress :    Social Connections:     Frequency of Communication with Friends and Family:     Frequency of Social Gatherings with Friends and Family:     Attends Muslim Services:     Active Member of Clubs or Organizations:     Attends Club or Organization Meetings:     Marital Status:          MEDICATIONS:   Current Facility-Administered Medications   Medication Dose Route Frequency Provider Last Rate Last Admin    acetaminophen (TYLENOL) tablet 650 mg  650 mg Oral Q4H PRN Merlyn Marie NP   650 mg at 10/14/21 1220    Or    acetaminophen (TYLENOL) solution 650 mg  650 mg Per NG tube Q4H PRN Merlyn Marie NP        Or    acetaminophen (TYLENOL) suppository 650 mg  650 mg Rectal Q4H PRN Yuni Kraus NP        0.9% sodium chloride infusion  75 mL/hr IntraVENous CONTINUOUS Yuni Kraus NP        magnesium hydroxide (MILK OF MAGNESIA) 400 mg/5 mL oral suspension 30 mL  30 mL Oral DAILY PRN Merlyn Marie NP        buPROPion SR [facility] SR) tablet 150 mg  150 mg Oral Q12H Gerri Kraus NP   150 mg at 10/14/21 1220    [START ON 10/15/2021] pantoprazole (PROTONIX) tablet 40 mg  40 mg Oral ACB Gerri Kraus NP        atorvastatin (LIPITOR) tablet 40 mg  40 mg Oral QHS Eva Pearce MD             ALLERGIES:  No Known Allergies      REVIEW OF SYSTEMS:  10 point ROS reviewed with patient and negative except for those listed above. PHYSICAL EXAM:  Vital Signs:   Visit Vitals  BP (!) 163/72 (BP 1 Location: Left arm, BP Patient Position: At rest)   Pulse 81   Temp 97.4 °F (36.3 °C)   Resp 16   SpO2 97%     Pleasant female was a good bleed exam room in no clear distress. HEENT appears grossly unremarkable, neck appears supple. Cardiovascular demonstrates constant S1/S2. Pulmonary demonstrates equal air entry bilaterally. Abdomen is nondistended. Extremities are warm/dry. Neurologically, patient appears alert and oriented attention is intact. Speech is clear, language is predominantly fluent with occasional paraphasic errors. There is subtle comprehension deficits. Naming reading writing not tested. Cranial through 12 appear grossly unremarkable. Motorically patient has normal bulk and tone has subtle drift with subtle pronation in right arm greater than leg. Strength in right deltoid is 5- out of 5, biceps is 5 out of 5, triceps 4+ out of 5 wrist extension is 4+ out of 5. Strength is 5 out of 5 in the left arm. Iliopsoas is 4+ out of 5 bilaterally. Knee flexion and extension are symmetric and 5- out of 5. Plantar flexion and dorsiflexion are equally symmetric at 5 out of 5. Sensation is grossly intact there is no cortical sensory loss noted. Coordination is intact in upper extremities. Remainder examination is deferred. PERTINENT DATA:  Py12 08  Aspirin assay 396  Chol 201, VLDL 31.2, LDL 93.8  HbA1c 5.6%    CT Results (maximum last 3):   Results from Hospital Encounter encounter on 10/14/21    CT CODE NEURO PERF W CBF    Narrative  EXAM:  CT CODE NEURO PERF W CBF  INDICATION:  code s, patient presents with history of acute on set at  approximately 0715 hours this morning with slurred speech and right-sided  weakness per daughter who was talking to her on the phone. Patient also noted  right arm weakness upon waking at 0630 hours this morning. TECHNIQUE:  During rapid bolus infusion 40 mL Isovue-370 CT perfusion acquisitions were  acquired with color coded mapping reconstruction of blood flow, blood volume and  mean transit time. CT dose reduction was achieved through use of a standardized  protocol tailored for this examination and automatic exposure control for dose  modulation. The VIZ A I algorithm was utilized for this exam.  COMPARISON: CT, CTA  FINDINGS:  Questionable minimal diminished flow/perfusion left posterior temporal lobe may  be artifactual. Otherwise no perfusion or flow abnormalities demonstrated in the  imaged cerebral hemispheres and cerebellum. Impression  Possible artifact left temporal lobe. Correlate clinically. Otherwise no  flow/perfusion abnormality demonstrated. CTA CODE NEURO HEAD AND NECK W CONT    Narrative  EXAM:  CTA CODE NEURO HEAD AND NECK W CONT  INDICATION:  Acute on set of speech disturbance and right-sided weakness while  talking on phone to daughter 0730 hours this morning. Also noted right-sided  weakness upon waking at 0630 hours. TECHNIQUE:  Axial spiral acquired CT angiography was performed from the aortic arch up  through the intracranial vessels. This was performed during intravenous bolus  infusion 100 mL of Isovue 370. Post-processing with  MIP reconstructions from  aortic arch up through the calvarium. Standard sagittal and coronal  reconstructions. CT dose reduction was achieved through use of a standardized  protocol tailored for this examination and automatic exposure control for dose  modulation. The VIZ A I algorithm was utilized with this examination. COMPARISON: CT, CTP  FINDINGS:  Mild atherosclerotic calcification aortic arch. There is a mild/moderate stenosis related atherosclerosis at the origin of the  left common carotid artery from the arch.  Respiratory and other beam hardening  artifact limits detail. The other origins brachycephalic arteries are adequately  maintained. The common carotid arteries are unremarkable up to the bifurcations. There is soft and some calcified atherosclerotic plaque involving the left  carotid bifurcation. Primarily soft plaque has resulted in severe stenosis with  the lumen of the left internal carotid artery measuring 1 mm or less. This  corresponds to an 80/90% stenosis by NASCET criteria. Possibility of soft plaque  hemorrhage/disruption should also be considered. The remaining left cervical  internal carotid artery is unremarkable. The right carotid bifurcation also has combination soft and calcified plaque  with minimal lumen diameter of the proximal right internal carotid artery of 4  mm corresponding to approximately 20% stenosis by NASCET criteria. The remaining  right cervical internal carotid arteries unremarkable. Carotid siphons are adequately maintained. The distal internal carotid arteries are without stenosis. Anterior cerebral  arteries and middle cerebral arteries are without stenosis or intraluminal  filling defect. Patient has a fetal origin to the right posterior cerebral  artery from the right internal carotid artery. There is a tiny left posterior  communicating artery. The left middle cerebral artery is smaller in size than the right with  generalized smaller left internal carotid artery which may be flow related to  the proximal cervical internal carotid artery severe stenosis. No definitive intraluminal filling defect, occlusion or acute arterial  abnormality demonstrated. Possible intracranial atherosclerosis versus artifact  in the distal anterior cerebral arteries bifurcation. There is no abnormal intracranial enhancement. Chronic findings of cervical spondylosis and at least mild spinal stenosis. Impression  1. Severe 80-90% stenosis left proximal internal carotid artery. See above.   2. Mild/moderate stenosis origin left common carotid artery. 3. Approximately 20% stenosis proximal right internal carotid artery. 4. No definitive intraluminal filling defect or other CTA findings of large  vessel intracranial occlusion. 5. Asymmetric decreased size left MCA may be related to flow limitation from the  internal carotid artery stenosis. Findings called to the ED M.D. by me. CT CODE NEURO HEAD WO CONTRAST    Narrative  EXAM: CT CODE NEURO HEAD WO CONTRAST    INDICATION: Right sided weakness    COMPARISON: 10/23/2018. CONTRAST: None. TECHNIQUE: Unenhanced CT of the head was performed using 5 mm images. Brain and  bone windows were generated. Coronal and sagittal reformats. CT dose reduction  was achieved through use of a standardized protocol tailored for this  examination and automatic exposure control for dose modulation. FINDINGS:  The ventricles and sulci are normal in size, shape and configuration. . Mild  small vessel ischemic changes are seen in the periventricular white matter. There is no intracranial hemorrhage, extra-axial collection, or mass effect. The  basilar cisterns are open. No CT evidence of acute infarct. The bone windows demonstrate no abnormalities. The visualized portions of the  paranasal sinuses and mastoid air cells are clear. Impression  No acute process or change compared to the prior exam.      MRI Results (maximum last 3): Results from [place] encounter on 10/14/21    MRI BRAIN WO CONT    Narrative  EXAM:  MRI BRAIN WO CONT  INDICATION:  acute stroke in the setting of right carotid artery 99% stenosis  TECHNIQUE:  Sagittal T1, axial FLAIR, T2,T1 and gradient echo images as well as coronal T2  weighted images and axial diffusion weighted images of the head were obtained. COMPARISON:  CT, CTA  FINDINGS:  Mild generalized ventricular and sulcal prominence consistent with age-related  volume loss.   Minimal focal T2 hyperintensity and restricted diffusion in the left posterior  superior temporal/parietal cortex suggesting a degree of acute cortical  infarction. (3, 11)  Questionable punctate focus versus artifact in the left parietal/occipital  junction. (3, 14)  Additionally, questionable artifact versus minimal restricted diffusion left  anterior frontal subcortical region (3, 9). No evidence of acute intracranial hemorrhage, mass or abnormal extra-axial fluid  collections. Flow voids are present in the vertebral basilar and carotid artery systems. There is somewhat unusual 1.3 cm FLAIR hyperintense mass in the left sella. FLAIR hyperintensity suggest is is not a simple cyst or subarachnoid fluid. There was no abnormal enhancement on the CTA. 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 Structures of the skull base  are otherwise unremarkable. Impression  1. Findings consistent with a degree of acute ischemic injury left parietal  lobe. See above descriptions. 2. Left sella/pituitary cystic mass. Incompletely evaluated. Depending on  clinical circumstance consider follow-up MRI with contrast and with attention to  the pituitary.     ASSESSMENT:      Dmitry Concepcion is a 62-year-old female with history of multiple vascular risk factors presented with acute onset of speech/language impairment as well as right hemiparesis arm greater than leg currently spontaneously improved    RECOMMENDATIONS:  Left thromboembolic ischemic infarct:  Appears secondary to thromboembolism from proximal left carotid stenosis with plans for neuro IR stenting in the morning  Discussed with patient and daughter they largely equivalent nature of CEA versus stenting, mention that given her age and comorbidities either would be an option though outcomes are largely equivalent as above  Offered to consult vascular surgery for further insight, deferred for the time being  We will plan to continue patient on aspirin and Plavix would recommend repeating PY 12 assay after 2 to 3 days given these fluctuant nature of these assays but defer to neuroradiology  Patient was started on 80 mg of atorvastatin, decreased to 40 given LDL patient was previously on Repatha encouraged her to resume this after discharge  Would maintain blood pressure less than 220/120 unless endorgan damage is noted for the first 24 hours and then decrease until stenting with further recommendations per neuro interventional radiology  Will need PT OT and speech eval's  Would start patient on pharmacologic DVT prophylaxis    Pituitary/sellar mass:      Neurology continue to follow please call with questions concerns    Pituitary/sellar mass:  Can reevaluate once acute situation is stabilized      Seble Ovalle MD

## 2021-10-14 NOTE — ROUTINE PROCESS
TRANSFER - OUT REPORT:    Verbal report given to HADLEY BRITT [facility], RN(name) on Marco Hidalgo  being transferred to 6S, room 668(unit) for routine progression of care       Report consisted of patients Situation, Background, Assessment and   Recommendations(SBAR). Information from the following report(s) SBAR, ED Summary and Recent Results was reviewed with the receiving nurse. Lines:   Peripheral IV 10/14/21 Right Antecubital (Active)       Peripheral IV Left Forearm (Active)        Opportunity for questions and clarification was provided.       Patient transported with:   [employer]

## 2021-10-14 NOTE — PROGRESS NOTES
NeuroInterventional Note  Patient presented with acute right sided symptoms and slurred speech. CTA showing severe left ICA stenosis. MRI brain with embolic strokes on the left hemisphere. Patient was started on  mg and Plavix 300 mg. Will repeat P2Y12 later today. Plan for carotid artery stenting in AM. Patient was explained about the risks and benefits of the procedure. Patient was informed that the risk of the procedure is less than 6% of causing stroke, cerebral hemorrhage or groin hematoma. Family was updated.     Marianela Pascual MD  [facility] NIS No

## 2021-10-14 NOTE — ED PROVIDER NOTES
HPI the patient is brought in by EMS as a level 1 code stroke. Per EMS she was talking on the phone with her daughter at approximately  this morning when she developed slurred speech and right-sided weakness. However the patient tells me that she woke up around 6:30 AM and noticed that her right arm was weak. She went to bed at about 1 AM with no symptoms. She admits to a mild headache. EMS reports blood sugar 85, blood pressure 166/80. Past Medical History:   Diagnosis Date    GERD (gastroesophageal reflux disease)     Hiatal hernia     Nausea & vomiting     Unspecified adverse effect of anesthesia     prolonged sedation       Past Surgical History:   Procedure Laterality Date    HX APPENDECTOMY  child, open    HX CHOLECYSTECTOMY  2012    HX ORTHOPAEDIC  2009    lumbar gonzalez.         Family History:   Problem Relation Age of Onset    Hypertension Mother     Heart Disease Mother     Cancer Mother     Hypertension Father        Social History     Socioeconomic History    Marital status:      Spouse name: Not on file    Number of children: Not on file    Years of education: Not on file    Highest education level: Not on file   Occupational History    Not on file   Tobacco Use    Smoking status: Former Smoker     Packs/day: 1.00     Quit date: 2011     Years since quittin.8   Substance and Sexual Activity    Alcohol use: Not on file     Comment: 2 drinks per month    Drug use: Not on file    Sexual activity: Not on file   Other Topics Concern    Not on file   Social History Narrative    Not on file     Social Determinants of Health     Financial Resource Strain:     Difficulty of Paying Living Expenses:    Food Insecurity:     Worried About Running Out of Food in the Last Year:     [address] in the Last Year:    Transportation Needs:     Lack of Transportation (Medical):      Lack of Transportation (Non-Medical):    Physical Activity:     Days of Exercise per Week:     Minutes of Exercise per Session:    Stress:     Feeling of Stress :    Social Connections:     Frequency of Communication with Friends and Family:     Frequency of Social Gatherings with Friends and Family:     Attends Anab[employer]ist Services:     Active Member of Clubs or Organizations:     Attends Club or Organization Meetings:     Marital Status:    Intimate Partner Violence:     Fear of Current or Ex-Partner:     Emotionally Abused:     Physically Abused:     Sexually Abused: ALLERGIES: Patient has no known allergies. Review of Systems   Constitutional: Negative for fever. HENT: Negative for voice change. Eyes: Negative for visual disturbance. Respiratory: Negative for cough and shortness of breath. Cardiovascular: Negative for chest pain. Gastrointestinal: Negative for abdominal pain, nausea and vomiting. Genitourinary: Negative for flank pain. Musculoskeletal: Negative for back pain. Skin: Negative for rash. Neurological: Positive for speech difficulty, weakness and headaches. Psychiatric/Behavioral: Negative for confusion. There were no vitals filed for this visit. Physical Exam  Constitutional:       General: She is not in acute distress. Appearance: She is well-developed. HENT:      Head: Normocephalic. Eyes:      Pupils: Pupils are equal, round, and reactive to light. Cardiovascular:      Rate and Rhythm: Normal rate. Heart sounds: No murmur heard. Pulmonary:      Effort: Pulmonary effort is normal.      Breath sounds: Normal breath sounds. Abdominal:      Palpations: Abdomen is soft. Tenderness: There is no abdominal tenderness. Musculoskeletal:         General: Normal range of motion. Cervical back: Normal range of motion. Skin:     General: Skin is warm and dry. Capillary Refill: Capillary refill takes less than 2 seconds. Neurological:      Mental Status: She is alert.       Comments: Patient with slurred speech and right facial droop. She also has right arm pronator drift. Psychiatric:         Behavior: Behavior normal.          MDM       Procedures ED EKG interpretation:  Rhythm: NSR, rate 73. No acute ST changes. This EKG was interpreted by Lori Reyez MD,ED Provider.]      Total critical care time spent exclusive of procedures: 39 min    Spoke with Dr. Kourtney Crocker, teleneurology, and he saw the patient. The patient is outside the window for TPA. He recommends admission for stroke work-up. Perfect Serve Consult for Admission  9:41 AM    ED Room Number: IK38/06  Patient Name and age:  Christelrenato Cabrera [de-identified] y.o.  female  Working Diagnosis:   1. Cerebrovascular accident (CVA), unspecified mechanism ([address]        COVID-19 Suspicion:  no  Sepsis present:  no  Reassessment needed: no  Code Status:  Full Code  Readmission: no  Isolation Requirements:  no  Recommended Level of Care:  med/surg  Department:[facility] Adult ED - 21   Other: Patient with level 2 CVA. Slurred speech, facial droop and right arm weakness, which is improving. Seen by teleneurology. CTA shows a critical stenosis of the left ICA, which neuro interventional is looking at.

## 2021-10-14 NOTE — PROGRESS NOTES
Physical therapy:    Orders received and chart reviewed. Patient admitted today with R sided weakness, slurred speech. Pt found to have Severe 80-90% stenosis left proximal internal carotid artery. Pt is to undergo stenting tomorrow. Will hold therapy evaluation until post-procedure.  Thank you    Valerie Kirkland, PT, DPT

## 2021-10-14 NOTE — ED NOTES
Pt arrives via EMS from home for slurred speech, R sided facial droop, and right sided weakness beginning at 0710. Hx HTN, HLD.  BG 85

## 2021-10-15 ENCOUNTER — APPOINTMENT (OUTPATIENT)
Dept: INTERVENTIONAL RADIOLOGY/VASCULAR | Age: 81
DRG: 035 | End: 2021-10-15
Attending: STUDENT IN AN ORGANIZED HEALTH CARE EDUCATION/TRAINING PROGRAM
Payer: MEDICARE

## 2021-10-15 LAB
ACT BLD: 136 SECS (ref 79–138)
ANION GAP SERPL CALC-SCNC: 6 MMOL/L (ref 5–15)
ASPIRIN TEST, ASPIRN: 394 ARU
BASOPHILS # BLD: 0.1 K/UL (ref 0–0.1)
BASOPHILS NFR BLD: 1 % (ref 0–1)
BUN SERPL-MCNC: 14 MG/DL (ref 6–20)
BUN/CREAT SERPL: 12 (ref 12–20)
CALCIUM SERPL-MCNC: 9.2 MG/DL (ref 8.5–10.1)
CHLORIDE SERPL-SCNC: 112 MMOL/L (ref 97–108)
CHOLEST SERPL-MCNC: 180 MG/DL
CO2 SERPL-SCNC: 24 MMOL/L (ref 21–32)
COVID-19 RAPID TEST, COVR: NOT DETECTED
CREAT SERPL-MCNC: 1.14 MG/DL (ref 0.55–1.02)
DIFFERENTIAL METHOD BLD: ABNORMAL
EOSINOPHIL # BLD: 0.6 K/UL (ref 0–0.4)
EOSINOPHIL NFR BLD: 6 % (ref 0–7)
ERYTHROCYTE [DISTWIDTH] IN BLOOD BY AUTOMATED COUNT: 13.2 % (ref 11.5–14.5)
GLUCOSE SERPL-MCNC: 88 MG/DL (ref 65–100)
HCT VFR BLD AUTO: 35.7 % (ref 35–47)
HDLC SERPL-MCNC: 85 MG/DL
HDLC SERPL: 2.1 {RATIO} (ref 0–5)
HGB BLD-MCNC: 12 G/DL (ref 11.5–16)
IMM GRANULOCYTES # BLD AUTO: 0 K/UL (ref 0–0.04)
IMM GRANULOCYTES NFR BLD AUTO: 0 % (ref 0–0.5)
LDLC SERPL CALC-MCNC: 77.2 MG/DL (ref 0–100)
LYMPHOCYTES # BLD: 2.7 K/UL (ref 0.8–3.5)
LYMPHOCYTES NFR BLD: 32 % (ref 12–49)
MCH RBC QN AUTO: 30.3 PG (ref 26–34)
MCHC RBC AUTO-ENTMCNC: 33.6 G/DL (ref 30–36.5)
MCV RBC AUTO: 90.2 FL (ref 80–99)
MONOCYTES # BLD: 0.7 K/UL (ref 0–1)
MONOCYTES NFR BLD: 8 % (ref 5–13)
NEUTS SEG # BLD: 4.5 K/UL (ref 1.8–8)
NEUTS SEG NFR BLD: 53 % (ref 32–75)
NRBC # BLD: 0 K/UL (ref 0–0.01)
NRBC BLD-RTO: 0 PER 100 WBC
P2Y12 PLT RESPONSE,PPPR: 202 PRU (ref 194–418)
P2Y12 PLT RESPONSE,PPPR: 35 PRU (ref 194–418)
PLATELET # BLD AUTO: 326 K/UL (ref 150–400)
PMV BLD AUTO: 9.8 FL (ref 8.9–12.9)
POTASSIUM SERPL-SCNC: 4.2 MMOL/L (ref 3.5–5.1)
RBC # BLD AUTO: 3.96 M/UL (ref 3.8–5.2)
SODIUM SERPL-SCNC: 142 MMOL/L (ref 136–145)
SOURCE, COVRS: NORMAL
TRIGL SERPL-MCNC: 89 MG/DL (ref ?–150)
TSH SERPL DL<=0.05 MIU/L-ACNC: 4.96 UIU/ML (ref 0.36–3.74)
VLDLC SERPL CALC-MCNC: 17.8 MG/DL
WBC # BLD AUTO: 8.6 K/UL (ref 3.6–11)

## 2021-10-15 PROCEDURE — 85347 COAGULATION TIME ACTIVATED: CPT

## 2021-10-15 PROCEDURE — 36415 COLL VENOUS BLD VENIPUNCTURE: CPT

## 2021-10-15 PROCEDURE — 77030008584 HC TOOL GDWRE DEV TERU -A

## 2021-10-15 PROCEDURE — C1769 GUIDE WIRE: HCPCS

## 2021-10-15 PROCEDURE — 80061 LIPID PANEL: CPT

## 2021-10-15 PROCEDURE — 74011250636 HC RX REV CODE- 250/636: Performed by: NURSE PRACTITIONER

## 2021-10-15 PROCEDURE — 74011000250 HC RX REV CODE- 250: Performed by: NURSE ANESTHETIST, CERTIFIED REGISTERED

## 2021-10-15 PROCEDURE — 74011250637 HC RX REV CODE- 250/637: Performed by: NURSE PRACTITIONER

## 2021-10-15 PROCEDURE — 74011250636 HC RX REV CODE- 250/636: Performed by: NURSE ANESTHETIST, CERTIFIED REGISTERED

## 2021-10-15 PROCEDURE — 2709999900 HC NON-CHARGEABLE SUPPLY

## 2021-10-15 PROCEDURE — 99231 SBSQ HOSP IP/OBS SF/LOW 25: CPT | Performed by: PSYCHIATRY & NEUROLOGY

## 2021-10-15 PROCEDURE — C1876 STENT, NON-COA/NON-COV W/DEL: HCPCS

## 2021-10-15 PROCEDURE — 74011250637 HC RX REV CODE- 250/637: Performed by: STUDENT IN AN ORGANIZED HEALTH CARE EDUCATION/TRAINING PROGRAM

## 2021-10-15 PROCEDURE — 85576 BLOOD PLATELET AGGREGATION: CPT

## 2021-10-15 PROCEDURE — 77030008684 HC TU ET CUF COVD -B: Performed by: ANESTHESIOLOGY

## 2021-10-15 PROCEDURE — 77030013797 HC KT TRNSDUC PRSSR EDWD -A

## 2021-10-15 PROCEDURE — C1760 CLOSURE DEV, VASC: HCPCS

## 2021-10-15 PROCEDURE — L1830 KO IMMOB CANVAS LONG PRE OTS: HCPCS

## 2021-10-15 PROCEDURE — 85025 COMPLETE CBC W/AUTO DIFF WBC: CPT

## 2021-10-15 PROCEDURE — 77030038563 HC TU ART PRESSR ICUM -Z

## 2021-10-15 PROCEDURE — 77030035304 HC CATH ANGI BENCHMARK PENU -G

## 2021-10-15 PROCEDURE — 80048 BASIC METABOLIC PNL TOTAL CA: CPT

## 2021-10-15 PROCEDURE — 74011000636 HC RX REV CODE- 636: Performed by: STUDENT IN AN ORGANIZED HEALTH CARE EDUCATION/TRAINING PROGRAM

## 2021-10-15 PROCEDURE — 84443 ASSAY THYROID STIM HORMONE: CPT

## 2021-10-15 PROCEDURE — 77030012468 HC VLV BLEEDBK CNTRL ABBT -B

## 2021-10-15 PROCEDURE — 74011000250 HC RX REV CODE- 250: Performed by: STUDENT IN AN ORGANIZED HEALTH CARE EDUCATION/TRAINING PROGRAM

## 2021-10-15 PROCEDURE — C1884 EMBOLIZATION PROTECT SYST: HCPCS

## 2021-10-15 PROCEDURE — 74011000258 HC RX REV CODE- 258: Performed by: NURSE ANESTHETIST, CERTIFIED REGISTERED

## 2021-10-15 PROCEDURE — 76060000035 HC ANESTHESIA 2 TO 2.5 HR

## 2021-10-15 PROCEDURE — 77030008477 HC STYL SATN SLP COVD -A: Performed by: ANESTHESIOLOGY

## 2021-10-15 PROCEDURE — 65610000006 HC RM INTENSIVE CARE

## 2021-10-15 PROCEDURE — 76942 ECHO GUIDE FOR BIOPSY: CPT

## 2021-10-15 PROCEDURE — 77030005402 HC CATH RAD ART LN KT TELE -B

## 2021-10-15 PROCEDURE — 74011250636 HC RX REV CODE- 250/636: Performed by: STUDENT IN AN ORGANIZED HEALTH CARE EDUCATION/TRAINING PROGRAM

## 2021-10-15 PROCEDURE — 87635 SARS-COV-2 COVID-19 AMP PRB: CPT

## 2021-10-15 PROCEDURE — 74011250637 HC RX REV CODE- 250/637: Performed by: PSYCHIATRY & NEUROLOGY

## 2021-10-15 RX ORDER — EPHEDRINE SULFATE/0.9% NACL/PF 50 MG/5 ML
SYRINGE (ML) INTRAVENOUS AS NEEDED
Status: DISCONTINUED | OUTPATIENT
Start: 2021-10-15 | End: 2021-10-15 | Stop reason: HOSPADM

## 2021-10-15 RX ORDER — ONDANSETRON 2 MG/ML
INJECTION INTRAMUSCULAR; INTRAVENOUS AS NEEDED
Status: DISCONTINUED | OUTPATIENT
Start: 2021-10-15 | End: 2021-10-15 | Stop reason: HOSPADM

## 2021-10-15 RX ORDER — DEXAMETHASONE SODIUM PHOSPHATE 4 MG/ML
INJECTION, SOLUTION INTRA-ARTICULAR; INTRALESIONAL; INTRAMUSCULAR; INTRAVENOUS; SOFT TISSUE AS NEEDED
Status: DISCONTINUED | OUTPATIENT
Start: 2021-10-15 | End: 2021-10-15 | Stop reason: HOSPADM

## 2021-10-15 RX ORDER — MIDAZOLAM HYDROCHLORIDE 1 MG/ML
INJECTION, SOLUTION INTRAMUSCULAR; INTRAVENOUS
Status: COMPLETED
Start: 2021-10-15 | End: 2021-10-15

## 2021-10-15 RX ORDER — EZETIMIBE 10 MG/1
10 TABLET ORAL DAILY
Status: DISCONTINUED | OUTPATIENT
Start: 2021-10-16 | End: 2021-10-24 | Stop reason: HOSPADM

## 2021-10-15 RX ORDER — HEPARIN SODIUM 1000 [USP'U]/ML
INJECTION, SOLUTION INTRAVENOUS; SUBCUTANEOUS AS NEEDED
Status: DISCONTINUED | OUTPATIENT
Start: 2021-10-15 | End: 2021-10-15 | Stop reason: HOSPADM

## 2021-10-15 RX ORDER — VERAPAMIL HYDROCHLORIDE 2.5 MG/ML
10 INJECTION, SOLUTION INTRAVENOUS
Status: COMPLETED | OUTPATIENT
Start: 2021-10-15 | End: 2021-10-15

## 2021-10-15 RX ORDER — GLYCOPYRROLATE 0.2 MG/ML
INJECTION INTRAMUSCULAR; INTRAVENOUS AS NEEDED
Status: DISCONTINUED | OUTPATIENT
Start: 2021-10-15 | End: 2021-10-15 | Stop reason: HOSPADM

## 2021-10-15 RX ORDER — SODIUM CHLORIDE 9 MG/ML
100 INJECTION, SOLUTION INTRAVENOUS CONTINUOUS
Status: DISPENSED | OUTPATIENT
Start: 2021-10-15 | End: 2021-10-16

## 2021-10-15 RX ORDER — MIDAZOLAM HYDROCHLORIDE 1 MG/ML
INJECTION, SOLUTION INTRAMUSCULAR; INTRAVENOUS AS NEEDED
Status: DISCONTINUED | OUTPATIENT
Start: 2021-10-15 | End: 2021-10-15 | Stop reason: HOSPADM

## 2021-10-15 RX ORDER — PROPOFOL 10 MG/ML
INJECTION, EMULSION INTRAVENOUS
Status: DISCONTINUED | OUTPATIENT
Start: 2021-10-15 | End: 2021-10-15 | Stop reason: HOSPADM

## 2021-10-15 RX ORDER — SODIUM CHLORIDE, SODIUM LACTATE, POTASSIUM CHLORIDE, CALCIUM CHLORIDE 600; 310; 30; 20 MG/100ML; MG/100ML; MG/100ML; MG/100ML
INJECTION, SOLUTION INTRAVENOUS
Status: DISCONTINUED | OUTPATIENT
Start: 2021-10-15 | End: 2021-10-15 | Stop reason: HOSPADM

## 2021-10-15 RX ORDER — SUCCINYLCHOLINE CHLORIDE 20 MG/ML
INJECTION INTRAMUSCULAR; INTRAVENOUS AS NEEDED
Status: DISCONTINUED | OUTPATIENT
Start: 2021-10-15 | End: 2021-10-15 | Stop reason: HOSPADM

## 2021-10-15 RX ORDER — PROPOFOL 10 MG/ML
INJECTION, EMULSION INTRAVENOUS AS NEEDED
Status: DISCONTINUED | OUTPATIENT
Start: 2021-10-15 | End: 2021-10-15 | Stop reason: HOSPADM

## 2021-10-15 RX ORDER — LIDOCAINE HYDROCHLORIDE 20 MG/ML
20 INJECTION, SOLUTION INFILTRATION; PERINEURAL
Status: DISCONTINUED | OUTPATIENT
Start: 2021-10-15 | End: 2021-10-15 | Stop reason: HOSPADM

## 2021-10-15 RX ADMIN — PROPOFOL 50 MCG/KG/MIN: 10 INJECTION, EMULSION INTRAVENOUS at 13:02

## 2021-10-15 RX ADMIN — HEPARIN SODIUM 4000 UNITS: 5000 INJECTION, SOLUTION INTRAVENOUS; SUBCUTANEOUS at 13:08

## 2021-10-15 RX ADMIN — PHENYLEPHRINE HYDROCHLORIDE 20 MCG/MIN: 10 INJECTION INTRAVENOUS at 16:12

## 2021-10-15 RX ADMIN — SODIUM CHLORIDE, POTASSIUM CHLORIDE, SODIUM LACTATE AND CALCIUM CHLORIDE: 600; 310; 30; 20 INJECTION, SOLUTION INTRAVENOUS at 12:43

## 2021-10-15 RX ADMIN — Medication 10 MG: at 13:38

## 2021-10-15 RX ADMIN — BUPROPION HYDROCHLORIDE 150 MG: 150 TABLET, EXTENDED RELEASE ORAL at 09:24

## 2021-10-15 RX ADMIN — HEPARIN SODIUM 4000 UNITS: 5000 INJECTION, SOLUTION INTRAVENOUS; SUBCUTANEOUS at 13:07

## 2021-10-15 RX ADMIN — HEPARIN SODIUM 4000 UNITS: 1000 INJECTION, SOLUTION INTRAVENOUS; SUBCUTANEOUS at 13:40

## 2021-10-15 RX ADMIN — SODIUM CHLORIDE 100 ML/HR: 9 INJECTION, SOLUTION INTRAVENOUS at 22:21

## 2021-10-15 RX ADMIN — VERAPAMIL HYDROCHLORIDE 10 MG: 2.5 INJECTION, SOLUTION INTRAVENOUS at 14:02

## 2021-10-15 RX ADMIN — ACETAMINOPHEN 650 MG: 325 TABLET ORAL at 00:16

## 2021-10-15 RX ADMIN — PHENYLEPHRINE HYDROCHLORIDE 40 MCG/MIN: 10 INJECTION INTRAVENOUS at 13:25

## 2021-10-15 RX ADMIN — CLOPIDOGREL BISULFATE 75 MG: 75 TABLET ORAL at 09:24

## 2021-10-15 RX ADMIN — SUCCINYLCHOLINE CHLORIDE 140 MG: 20 INJECTION, SOLUTION INTRAMUSCULAR; INTRAVENOUS at 13:19

## 2021-10-15 RX ADMIN — DEXAMETHASONE SODIUM PHOSPHATE 4 MG: 4 INJECTION, SOLUTION INTRAMUSCULAR; INTRAVENOUS at 13:40

## 2021-10-15 RX ADMIN — GLYCOPYRROLATE 0.4 MG: 0.2 INJECTION INTRAMUSCULAR; INTRAVENOUS at 13:54

## 2021-10-15 RX ADMIN — HEPARIN SODIUM 4000 UNITS: 5000 INJECTION, SOLUTION INTRAVENOUS; SUBCUTANEOUS at 13:04

## 2021-10-15 RX ADMIN — ASPIRIN 81 MG CHEWABLE TABLET 81 MG: 81 TABLET CHEWABLE at 09:24

## 2021-10-15 RX ADMIN — DEXMEDETOMIDINE HYDROCHLORIDE 8 MCG: 100 INJECTION, SOLUTION, CONCENTRATE INTRAVENOUS at 13:07

## 2021-10-15 RX ADMIN — ACETAMINOPHEN 650 MG: 325 TABLET ORAL at 21:37

## 2021-10-15 RX ADMIN — PROPOFOL 100 MG: 10 INJECTION, EMULSION INTRAVENOUS at 13:19

## 2021-10-15 RX ADMIN — TICAGRELOR 45 MG: 90 TABLET ORAL at 18:14

## 2021-10-15 RX ADMIN — ONDANSETRON HYDROCHLORIDE 4 MG: 2 INJECTION, SOLUTION INTRAMUSCULAR; INTRAVENOUS at 14:25

## 2021-10-15 RX ADMIN — ATORVASTATIN CALCIUM 40 MG: 40 TABLET, FILM COATED ORAL at 00:16

## 2021-10-15 RX ADMIN — PROPOFOL 50 MG: 10 INJECTION, EMULSION INTRAVENOUS at 13:29

## 2021-10-15 RX ADMIN — IOPAMIDOL 84 ML: 612 INJECTION, SOLUTION INTRAVENOUS at 14:14

## 2021-10-15 RX ADMIN — PHENYLEPHRINE HYDROCHLORIDE 120 MG: 10 INJECTION INTRAVENOUS at 14:37

## 2021-10-15 RX ADMIN — MIDAZOLAM HYDROCHLORIDE 1 MG: 1 INJECTION, SOLUTION INTRAMUSCULAR; INTRAVENOUS at 12:55

## 2021-10-15 NOTE — ANESTHESIA PREPROCEDURE EVALUATION
Relevant Problems   No relevant active problems       Anesthetic History   No history of anesthetic complications            Review of Systems / Medical History  Patient summary reviewed, nursing notes reviewed and pertinent labs reviewed    Pulmonary  Within defined limits                 Neuro/Psych   Within defined limits      TIA     Cardiovascular  Within defined limits  Hypertension                   GI/Hepatic/Renal  Within defined limits   GERD           Endo/Other  Within defined limits           Other Findings   Comments: Restless Legs Synd. Physical Exam    Airway  Mallampati: II  TM Distance: > 6 cm  Neck ROM: normal range of motion   Mouth opening: Normal     Cardiovascular  Regular rate and rhythm,  S1 and S2 normal,  no murmur, click, rub, or gallop             Dental  No notable dental hx       Pulmonary  Breath sounds clear to auscultation               Abdominal  GI exam deferred       Other Findings            Anesthetic Plan    ASA: 4  Anesthesia type: MAC and general    Monitoring Plan: Arterial line      Induction: Intravenous  Anesthetic plan and risks discussed with: Patient      Discussed at length CODE and Intubation status. Agrees to Full Code.

## 2021-10-15 NOTE — BRIEF OP NOTE
NEUROINTERVENTIONAL SURGERY POST-PROCEDURE NOTE    PROCEDURE:  Diagnostic cerebral angiogram and left sided carotid artery stenting    VESSEL(S) STUDIED:  1. Left common carotid artery  2. Left internal carotid artery  3. Right common femoral artery VESSEL(S) TREATED:  1. Left internal carotid artery  2. Left common carotid artery      PRELIMINARY REPORT & DISPOSITION:   Severe stenosis of the left internal carotid artery, s/p stenting. EBL: 5 cc    COMPLICATIONS:  None    FOLLOW-UP:  SBP less than 140 mmHg DATE OF SERVICE:  10/15/2021 5:58 PM     ATTENDING SURGEON(S):  Hugh Aldana MD    ANESTHESIA:   General anesthesia    MEDICATIONS:   See nursing record    PUNCTURE SITE:  Right common femoral artery. Arteriotomy closed with 8F angioseal. Flat x 2 hours. Right leg straight for 4h.           Hugh Aldana MD  [address] [place] [address] Surgery

## 2021-10-15 NOTE — PROGRESS NOTES
Speech pathology note  Chart reviewed. Pt is to undergo stenting of R ICA today at 1330 per RN. Will hold swallow evaluation until post-procedure and will follow up pending medical stability. Thank you.     Ira Macdonald., CCC-SLP

## 2021-10-15 NOTE — ANESTHESIA PROCEDURE NOTES
Arterial Line Placement    Start time: 10/15/2021 12:50 PM  End time: 10/15/2021 12:55 PM  Performed by: Luis Chavez CRNA  Authorized by: Alison Bragg MD     Pre-Procedure  Indications:  Arterial pressure monitoring  Preanesthetic Checklist: patient identified, risks and benefits discussed, anesthesia consent, site marked, patient being monitored, timeout performed and patient being monitored    Timeout Time: 12:50 EDT        Procedure:   Prep:  Chlorhexidine  Seldinger Technique?: Yes    Orientation:  Right  Location:  Radial artery  Catheter size:  20 G  Number of attempts:  1  Cont Cardiac Output Sensor: Yes      Assessment:   Post-procedure:  Line secured and sterile dressing applied  Patient Tolerance:  Patient tolerated the procedure well with no immediate complications

## 2021-10-15 NOTE — PROGRESS NOTES
Problem: TIA/CVA Stroke: 0-24 hours  Goal: Activity/Safety  Outcome: Progressing Towards Goal  Goal: Treatments/Interventions/Procedures  Outcome: Progressing Towards Goal  Goal: *Hemodynamically stable  Outcome: Progressing Towards Goal

## 2021-10-15 NOTE — PROGRESS NOTES
Pt arrives via stretcher to angio department accompanied by transporter for cerebral angiogram with intervention procedure. All assessments completed and consent was reviewed. Education given was regarding procedure, general anesthesia sedation, post-procedure care and  management/follow-up. Opportunity for questions was provided and all questions and concerns were addressed.

## 2021-10-15 NOTE — ROUTINE PROCESS
Bedside and Verbal shift change report given to Wendy RN (oncoming nurse) by Shayna Soriano RN (offgoing nurse). Report included the following information SBAR, Kardex, Intake/Output, MAR, Accordion, Cardiac Rhythm NSR, Alarm Parameters , Quality Measures and Dual Neuro Assessment.

## 2021-10-15 NOTE — PROGRESS NOTES
Physical therapy:    Chart reviewed. Pt is to undergo stenting of R ICA today. Will hold therapy evaluation until post-procedure and will follow up pending medical stability.  Thank you    Tami Celaya, PT, DPT

## 2021-10-15 NOTE — PROGRESS NOTES
Patient moved to angio and moved to angio table without incidence. Timeout performed with all pertinent staff members present. Anesthesia at bedside currently and will remain to manage patient airway, patient medications, monitor patient vital signs and patient status throughout case and until hand off to another qualified provider.

## 2021-10-15 NOTE — PROGRESS NOTES
Occupational Therapy:     8:45: Chart reviewed. Pt is to undergo stenting of R ICA today. Will hold OT evaluation until post-procedure and will follow up pending medical stability. 14:10: Pt still off the floor for procedure. Will follow up as available and pending medical stability. Thank you.      Mateo Ohs, OTD, OTR/L

## 2021-10-15 NOTE — CONSULTS
Neurointerventional Surgery Progress Note    Patient: Filippo Jennings MRN: [id number]  SSN: [id number]    YOB: 1940  Age: [de-identified] y.o. Sex: female      Subjective:      No acute events overnight. Patient is much improved from initial presentation yesterday. NIH today is 0 and patient has no facial droop or detectable dysarthria on exam. Plans to move forward with left ICA stenting today with Dr. Paige Antunez. She is therapeutic on both aspirin and plavix. NIHSS:      1a-LOC: 0    1b-Month/Age: 0    1c-Open/Close Hand: 0    2-Best Gaze: 0    3-Visual Fields: 0    4-Facial Palsy: 0    5a-Left Arm: 0     5b-Right Arm: 0    6a-Left Le    6b-Right Le    7-Limb Ataxia: 0    8-Sensory: 0    9-Best Language: 0    10-Dysarthria: 0    11-Extinction/Inattention: 0  TOTAL SCORE: 0    She denies headache, dizziness, numbness/tingling, weakness on one side or the other, sensation changes, visual disturbance, speech disturbance, gait imbalance, chest pain, shortness of breath or any other symptoms at this time. History of Presenting Illness:      Filippo Jennings is a [de-identified] y.o. female with a past medical history of GERD, HLD, hypertension and depression who presented to the emergency department via EMS with slurred speech and right-sided weakness. She is unsure of time of onset and thinks she may have woken up this way however did not notice slurred speech until speaking with her daughter on the phone this morning at 715. She went to bed normal last night. She was deemed not a candidate for TPA by teleneurology due to unknown time of onset. The patient reports that she takes a baby aspirin every day along with Wellbutrin Prevacid. The patient reports that she is prescribed a medication for hypertension but cannot tell me the name and states she only takes one half of the pill.   The patient also reports a recent appointment with an outpatient neurologist at [facility] for evaluation of right leg weakness which she states has been getting progressively worse over the past several years and causing her to have multiple falls. She reports having a brain MRI and being told that she has \"ischemia\" but is unsure where and what diagnosis is. The patient's daughter is going to bring in CD of MRI from [facility]. CTA head and neck reviewed reveals severe 80 to 90% stenosis of the left proximal internal carotid artery, MRI reveals multiple punctate embolic strokes in the left posterior superior temporal/parietal cortex, left parietal/occipital junction and left anterior frontal subcortical region. 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 Currently at time of my exam the patient endorses difficulty with speech, right facial droop, right arm and leg weakness, dysmetria of right hand. Specifically she denies headache, dizziness , Chest pain, shortness of breath, visual disturbance. She states that she chronically has gait imbalance with right leg weakness. Assessment:     Hospital Problems  Date Reviewed: 10/22/2018        Codes Class Noted POA    CVA (cerebral vascular accident) [facility]) ICD-10-CM: I63.9  ICD-9-CM: 434.91  10/14/2021 Unknown              Plan:     Spencer Cook is an 61-year-old female who presents to the hospital with clinical stroke later confirmed by MRI. She is deemed not a candidate for TPA due to unknown time of onset. The patient has an 80 to 90% stenotic right ICA and punctate embolic strokes seen on MRI in the left parietal region primarily. She is maintained outpatient on baby aspirin and Repatha as well as one medication for hypertension that is unknown to this writer.     Left thromboembolic ischemic infarct in the setting of severe L ICA Stenosis   - Permissive hypertension until carotid artery has been angioplasty/stented THEN will change BP goals to <140  -Patient was started on Lipitor 40 mg this hospitalization however agree with neurology to resume Repatha outpatient  -P2Y12 is 192, aspirin is therapeutic at 396  -Plans for endovascular stenting today with Dr. Hubert Pabon   - will recover patient overnight in ICU s/p stenting   -Creatinine improved to 1.14 with IV fluid hydration  -Neurology has been consulted  -PT/OT/speech eval was ordered  -Will start DVT prophylaxis after procedure today    Plans for endovascular stenting including risks, benefits, and alternatives were discussed with the patient and her two daughters present along with Dr. Hubert Pabon. This procedure has been fully reviewed with the patient and written informed consent has been obtained.             Past Medical History:   Diagnosis Date    GERD (gastroesophageal reflux disease)     Hiatal hernia     Hypertension     Nausea & vomiting     Unspecified adverse effect of anesthesia     prolonged sedation     Past Surgical History:   Procedure Laterality Date    HX APPENDECTOMY  child, open    HX CHOLECYSTECTOMY      HX ORTHOPAEDIC  2009    lumbar gonzalez.      Family History   Problem Relation Age of Onset    Hypertension Mother     Heart Disease Mother     Cancer Mother     Hypertension Father      Social History     Tobacco Use    Smoking status: Former Smoker     Packs/day: 1.00     Quit date: 2011     Years since quittin.8   Substance Use Topics    Alcohol use: Not on file     Comment: 2 drinks per month      Current Facility-Administered Medications   Medication Dose Route Frequency Provider Last Rate Last Admin    lidocaine (XYLOCAINE) 20 mg/mL (2 %) injection 400 mg  20 mL SubCUTAneous RAD ONCE Harrison Williamson MD        heparin 4000 units/1000 mL (4 units/mL) in NS infusion **FOR ANGIO USE ONLY**   IntraVENous RAD PRN Harrison Williamson MD        iopamidoL (ISOVUE 300) 61 % contrast injection 300 mL  300 mL IntraCATHeter RAD Deirdre Brownlee MD       [facility] acetaminophen (TYLENOL) tablet 650 mg  650 mg Oral Q4H PRN Pavan Louie NP   650 mg at 10/15/21 0016    Or    acetaminophen (TYLENOL) solution 650 mg  650 mg Per NG tube Q4H PRN Pavan Louie NP        Or    acetaminophen (TYLENOL) suppository 650 mg  650 mg Rectal Q4H PRN Wolcott, Mendel Maduro, NP        0.9% sodium chloride infusion  100 mL/hr IntraVENous CONTINUOUS Supina RODRIGUEZ  mL/hr at 10/14/21 1842 100 mL/hr at 10/14/21 1842    magnesium hydroxide (MILK OF MAGNESIA) 400 mg/5 mL oral suspension 30 mL  30 mL Oral DAILY PRN Pavan Louei NP        buPROPion [facility]) tablet 150 mg  150 mg Oral Q12H Gerri Kraus NP   150 mg at 10/15/21 3066    pantoprazole (PROTONIX) tablet 40 mg  40 mg Oral ACB Gerri Kraus NP        atorvastatin (LIPITOR) tablet 40 mg  40 mg Oral QHS Radha Gold MD   40 mg at 10/15/21 0016    clopidogreL (PLAVIX) tablet 75 mg  75 mg Oral DAILY Peder Laser, NP   75 mg at 10/15/21 2701    aspirin chewable tablet 81 mg  81 mg Oral DAILY Peder Laser, NP   81 mg at 10/15/21 5267        No Known Allergies    Review of Systems:  A comprehensive review of systems was negative except for that written in the History of Present Illness. Objective:     Vitals:    10/15/21 0414 10/15/21 0558 10/15/21 0607 10/15/21 1010   BP:  (!) 150/67  (!) 151/65   Pulse: 81 91 80 85   Resp:  19  16   Temp:  98 °F (36.7 °C)  97.6 °F (36.4 °C)   SpO2:  96%  97%   Weight:       Height:            Physical Exam:  GENERAL: alert, cooperative, no distress, appears stated age  LUNG: clear to auscultation bilaterally  HEART: regular rate and rhythm, S1, S2 normal, no murmur, click, rub or gallop  ABDOMEN: soft, non-tender. Bowel sounds normal. No masses,  no organomegaly  EXTREMITIES:  extremities normal, atraumatic, no cyanosis or edema  SKIN: Normal.    Neurologic Exam:  Mental Status:  Alert and oriented x 4. Appropriate affect, mood  and behavior. Language:     normal repetition, comprehension and naming. Cranial Nerves:   Pupils equal, round and reactive to light. Visual fields full to confrontation. Extraocular movements intact. Facial sensation intact V1 - V3. No facial asymmetry      Hearing intact bilaterally. No dysarthria present. Tongue protrudes to midline, palate elevates                                     symmetrically. Shoulder shrug 5/5 bilaterally. Motor:    Mild right arm pronator drift. Bulk and tone normal.      5/5 power in all extremities proximally and distally on the left                                       5/5 power in extremities proximally and distally on the right     No involuntary movements. Sensation:    Sensation intact throughout to light touch, temperature    Coordination & Gait: Gait was deferred. Finger-to-nose and heel-to-shin is intact      Imaging: All imaging reviewed by myself and Dr. Oma Ocampo Contrast:      IMPRESSION  Possible artifact left temporal lobe. Correlate clinically. Otherwise no  flow/perfusion abnormality demonstrated. CTA Head/Neck 10/14/21:     IMPRESSION  1. Severe 80-90% stenosis left proximal internal carotid artery. See above. 2. Mild/moderate stenosis origin left common carotid artery. 3. Approximately 20% stenosis proximal right internal carotid artery. 4. No definitive intraluminal filling defect or other CTA findings of large  vessel intracranial occlusion. 5. Asymmetric decreased size left MCA may be related to flow limitation from the  internal carotid artery stenosis.     MRI Brain WO Contrast 10/14/21:     IMPRESSION  1. Findings consistent with a degree of acute ischemic injury left parietal  lobe. See above descriptions. 2. Left sella/pituitary cystic mass. Incompletely evaluated. Depending on  clinical circumstance consider follow-up MRI with contrast and with attention to  the pituitary.         Signed By: Marisela Nagy Jason Xie, MAY     October 15, 2021

## 2021-10-15 NOTE — PROGRESS NOTES
Transition of Care Plan: TBD-waiting on PT/OT/medical recs    RUR: 9%    PCP F/U: Dr. Ming Thomas    Disposition: TBD-waiting on  PT/OT/medical recs    Transportation: TBD-BLS vs Family    Main Contact: SILVIA Gaytan-[phone]    1076: CM spoke with daughter, Josseline Landaverde, via telephone call. Confirmed patient demographics. States that patient lives in a [place]. Stairs leading up to landing and master bedroom. Has hand rails but slow to get up the stairs. Has rollator and cane, but does not use them. Independent with ADLs. Uses [facility] off of [address]. Daughter states that mother will come home with her after hospitalization or facility. States she will transport home if indicated. Will continue to follow for  PT/OT/medical recs     Glen Duran RN, CRM Medicare pt's family has received, reviewed, and signed 1st IM letter informing them of their right to appeal the discharge. Signed copy has been placed on pt bedside chart. Reason for Admission: CVA                      RUR Score:  9%                   Plan for utilizing home health:   Waiting on PT/OT recs     PCP: First and Last name:  Bernice Najjar, MD     Name of Practice:    Are you a current patient: Yes/No: yes   Approximate date of last visit: 1-2 weeks ago   Can you participate in a virtual visit with your PCP: no                    Current Advanced Directive/Advance Care Plan: Partial Code      Healthcare Decision Maker:   Click here to complete [address] including selection of the Healthcare Decision Maker Relationship (ie \"Primary\")             Primary Decision Maker: Shannan Monday - Daughter - [phone]                  Transition of Care Plan:    TBD-Waiting on PT/OT/medical recs                  Care Management Interventions  PCP Verified by CM: Yes (Dr. Ming Thomas)  Mode of Transport at Discharge:  Other (see comment) (TBD: family vs BLS)  Transition of Care Consult (CM Consult): (TBD)  Physical Therapy Consult: Yes  Occupational Therapy Consult: Yes  Support Systems: Child(mayra)  Confirm Follow Up Transport: Other (see comment) (TBD-family vs bls)  Discharge Location  Discharge Placement: Other: (TBD-pending recs)

## 2021-10-15 NOTE — PROGRESS NOTES
TRANSFER - IN REPORT:    Verbal report received from Via Larissa Coulter RN (name) on Claudia Bishop  being received from Angio (unit) for routine progression of care      Report consisted of patients Situation, Background, Assessment and   Recommendations(SBAR). Information from the following report(s) SBAR, Intake/Output, Med Rec Status, Cardiac Rhythm NSr and Dual Neuro Assessment was reviewed with the receiving nurse. Opportunity for questions and clarification was provided. Assessment completed upon patients arrival to unit and care assumed.

## 2021-10-15 NOTE — PROGRESS NOTES
Hospitalist Progress Note  Jesusita Tavares MD  Answering service: [phone] from in house phone      Date of Service:  10/15/2021  NAME:  Elyse Pagan  :  1940  MRN:  [id number]    Admission Summary:   81F p/w CVA  Interval history / Subjective:   Patient seen and examined at bedside, feels well, her symptoms all resolved by yesterday afternoon. Family at bedside, a/w stenting of carotid today. Assessment & Plan:     CVA/TIA: symptoms resoved within 5-6hours. - CTA head/neck- Severe 80-90% stenosis L- proximal internal carotid artery   - MRI and ECHO pending   - A1c 5.6, LDL 77  - Nuerointerventional plans for Stenting of L ICA on 10/15.  - Neurology consulted - SLP/PT/OT- Start Atorvastatin 80mg   - Aspirin and Plavix- will defer to neurology      Hypertension - Allow for permissive HTN- Hydralazine PRN. monitor  Elevated creatinine - IV fluids - Monitor labs in am   GERD - Continue PPI   Depression - Supportive treatment - Continue home dose wellbutrin     Code status: Partial.  DVT prophylaxis: SCDs  Care Plan discussed with: Patient/Family and Nurse  Disposition: TBD 1-2days     Hospital Problems  Date Reviewed: 10/22/2018        Codes Class Noted POA    CVA (cerebral vascular accident) [facility]) ICD-10-CM: I63.9  ICD-9-CM: 434.91  10/14/2021 Unknown            Review of Systems:   Pertinent items are mentioned in interval history. Vital Signs:    Last 24hrs VS reviewed since prior progress note. Most recent are:  Visit Vitals  BP (!) 151/65 (BP 1 Location: Left upper arm, BP Patient Position: At rest)   Pulse 85   Temp 97.6 °F (36.4 °C)   Resp 16   Ht 5' 4\" (1.626 m)   Wt 73.5 kg (162 lb)   SpO2 97%   BMI 27.81 kg/m²       No intake or output data in the 24 hours ending 10/15/21 1038     Physical Examination:   Evaluated face to face and examined 10/15/21    General:  Alert, oriented, No acute distress.  Pleasant elderly [employer]  Card:  S1, S2, No murmurs, good peripheral perfusion, warm peripheries  Resp:  No accessory muscle use, Good AE, no wheezes. no crepitations  Abd:  Soft, non-tender, non-distended, BS+  Extremities:  No cyanosis or clubbing, no significant edema  Neuro:  Grossly normal, no focal neuro deficits, follows commands, speech wnl- all deficits on RUE resolved  Psych:  Good insight, not agitated. Data Review:    Review and/or order of clinical lab test  Review and/or order of tests in the radiology section of [facility]  Review and/or order of tests in the medicine section of [facility]  Labs:     Recent Labs     10/15/21  0315 10/14/21  0834   WBC 8.6 8.7   HGB 12.0 12.9   HCT 35.7 39.5    356     Recent Labs     10/15/21  0315 10/14/21  0834    140   K 4.2 3.6   * 110*   CO2 24 27   BUN 14 17   CREA 1.14* 1.22*   GLU 88 112*   CA 9.2 9.1     Recent Labs     10/14/21  0834   ALT 17   *   TBILI 0.3   TP 7.3   ALB 3.2*   GLOB 4.1*     No results for input(s): INR, PTP, APTT, INREXT in the last 72 hours. No results for input(s): FE, TIBC, PSAT, FERR in the last 72 hours. No results found for: FOL, RBCF   No results for input(s): PH, PCO2, PO2 in the last 72 hours. No results for input(s): CPK, CKNDX, TROIQ in the last 72 hours.     No lab exists for component: CPKMB  Lab Results   Component Value Date/Time    Cholesterol, total 180 10/15/2021 03:15 AM    HDL Cholesterol 85 10/15/2021 03:15 AM    LDL, calculated 77.2 10/15/2021 03:15 AM    Triglyceride 89 10/15/2021 03:15 AM    CHOL/HDL Ratio 2.1 10/15/2021 03:15 AM     No results found for: GLUCPOC  No results found for: COLOR, APPRN, SPGRU, REFSG, HECTOR, PROTU, GLUCU, KETU, BILU, UROU, JAMES, LEUKU, GLUKE, EPSU, BACTU, WBCU, RBCU, CASTS, UCRY  Medications Reviewed:     Current Facility-Administered Medications   Medication Dose Route Frequency    acetaminophen (TYLENOL) tablet 650 mg  650 mg Oral Q4H PRN    Or    acetaminophen (TYLENOL) solution 650 mg 650 mg Per NG tube Q4H PRN    Or    acetaminophen (TYLENOL) suppository 650 mg  650 mg Rectal Q4H PRN    0.9% sodium chloride infusion  100 mL/hr IntraVENous CONTINUOUS    magnesium hydroxide (MILK OF MAGNESIA) 400 mg/5 mL oral suspension 30 mL  30 mL Oral DAILY PRN    buPROPion SR (WELLBUTRIN SR) tablet 150 mg  150 mg Oral Q12H    pantoprazole (PROTONIX) tablet 40 mg  40 mg Oral ACB    atorvastatin (LIPITOR) tablet 40 mg  40 mg Oral QHS    clopidogreL (PLAVIX) tablet 75 mg  75 mg Oral DAILY    aspirin chewable tablet 81 mg  81 mg Oral DAILY   ______________________________________________________________________  EXPECTED LENGTH OF STAY: - - -  ACTUAL LENGTH OF STAY:          1               Ashley Lovell MD

## 2021-10-15 NOTE — ROUTINE PROCESS
TRANSFER - OUT REPORT:    Verbal report given to Swetha RN(name) on Mallissa Many  being transferred to ICU(unit) for routine post - op       Report consisted of patients Situation, Background, Assessment and   Recommendations(SBAR). Information from the following report(s) SBAR and Procedure Summary was reviewed with the receiving nurse. Lines:   Peripheral IV 10/14/21 Right Antecubital (Active)   Site Assessment Clean, dry, & intact 10/15/21 0946   Phlebitis Assessment 0 10/15/21 0946   Infiltration Assessment 0 10/15/21 0946   Dressing Status Clean, dry, & intact 10/15/21 0946   Dressing Type Transparent 10/15/21 0946   Hub Color/Line Status Capped;Pink 10/15/21 0946   Action Taken Open ports on tubing capped 10/14/21 1600   Alcohol Cap Used Yes 10/14/21 1600       Peripheral IV Left Forearm (Active)   Site Assessment Clean, dry, & intact 10/15/21 0946   Phlebitis Assessment 0 10/15/21 0946   Infiltration Assessment 0 10/15/21 0946   Dressing Status Clean, dry, & intact 10/15/21 0946   Dressing Type Transparent 10/15/21 0946   Hub Color/Line Status Capped 10/15/21 0946   Action Taken Open ports on tubing capped 10/14/21 1600   Alcohol Cap Used Yes 10/14/21 1600       Arterial Line 10/15/21 Right Radial artery (Active)        Opportunity for questions and clarification was provided.       Patient transported with:   Registered Nurse

## 2021-10-15 NOTE — PROGRESS NOTES
Neurology Progress Note    Patient ID:  Christel Washington  [id number]  5 y.o.  1940    Subjective:      No symptom events noted in the past 24 hours. Patient remained stable with ongoing improvement in deficits though persistent. Is feeling comfortable plans for stenting today. Current Facility-Administered Medications   Medication Dose Route Frequency    lidocaine (XYLOCAINE) 20 mg/mL (2 %) injection 400 mg  20 mL SubCUTAneous RAD ONCE    heparin 4000 units/1000 mL (4 units/mL) in NS infusion **FOR ANGIO USE ONLY**   IntraVENous RAD PRN    iopamidoL (ISOVUE 300) 61 % contrast injection 300 mL  300 mL IntraCATHeter RAD ONCE    acetaminophen (TYLENOL) tablet 650 mg  650 mg Oral Q4H PRN    Or    acetaminophen (TYLENOL) solution 650 mg  650 mg Per NG tube Q4H PRN    Or    acetaminophen (TYLENOL) suppository 650 mg  650 mg Rectal Q4H PRN    0.9% sodium chloride infusion  100 mL/hr IntraVENous CONTINUOUS    magnesium hydroxide (MILK OF MAGNESIA) 400 mg/5 mL oral suspension 30 mL  30 mL Oral DAILY PRN    buPROPion SR (WELLBUTRIN SR) tablet 150 mg  150 mg Oral Q12H    pantoprazole (PROTONIX) tablet 40 mg  40 mg Oral ACB    atorvastatin (LIPITOR) tablet 40 mg  40 mg Oral QHS    clopidogreL (PLAVIX) tablet 75 mg  75 mg Oral DAILY    aspirin chewable tablet 81 mg  81 mg Oral DAILY          Objective:     Patient Vitals for the past 8 hrs:   BP Temp Pulse Resp SpO2   10/15/21 1010 (!) 151/65 97.6 °F (36.4 °C) 85 16 97 %   10/15/21 0607 -- -- 80 -- --   10/15/21 0558 (!) 150/67 98 °F (36.7 °C) 91 19 96 %   10/15/21 0414 -- -- 81 -- --       No intake/output data recorded. No intake/output data recorded.     Lab Review   Recent Results (from the past 24 hour(s))   PLATELET FUNCTION, VERIFY NOW P2Y12    Collection Time: 10/14/21 12:25 PM   Result Value Ref Range    P2Y12 Plt response 208 194 - 418 PRU   ASPIRIN TEST    Collection Time: 10/14/21 12:25 PM   Result Value Ref Range    Aspirin test 396 ARU PLATELET FUNCTION, VERIFY NOW P2Y12    Collection Time: 10/14/21  5:36 PM   Result Value Ref Range    P2Y12 Plt response 192 (L) 194 - 418 PRU   LIPID PANEL    Collection Time: 10/15/21  3:15 AM   Result Value Ref Range    Cholesterol, total 180 <200 MG/DL    Triglyceride 89 <150 MG/DL    HDL Cholesterol 85 MG/DL    LDL, calculated 77.2 0 - 100 MG/DL    VLDL, calculated 17.8 MG/DL    CHOL/HDL Ratio 2.1 0.0 - 5.0     CBC WITH AUTOMATED DIFF    Collection Time: 10/15/21  3:15 AM   Result Value Ref Range    WBC 8.6 3.6 - 11.0 K/uL    RBC 3.96 3.80 - 5.20 M/uL    HGB 12.0 11.5 - 16.0 g/dL    HCT 35.7 35.0 - 47.0 %    MCV 90.2 80.0 - 99.0 FL    MCH 30.3 26.0 - 34.0 PG    MCHC 33.6 30.0 - 36.5 g/dL    RDW 13.2 11.5 - 14.5 %    PLATELET 642 159 - 902 K/uL    MPV 9.8 8.9 - 12.9 FL    NRBC 0.0 0  WBC    ABSOLUTE NRBC 0.00 0.00 - 0.01 K/uL    NEUTROPHILS 53 32 - 75 %    LYMPHOCYTES 32 12 - 49 %    MONOCYTES 8 5 - 13 %    EOSINOPHILS 6 0 - 7 %    BASOPHILS 1 0 - 1 %    IMMATURE GRANULOCYTES 0 0.0 - 0.5 %    ABS. NEUTROPHILS 4.5 1.8 - 8.0 K/UL    ABS. LYMPHOCYTES 2.7 0.8 - 3.5 K/UL    ABS. MONOCYTES 0.7 0.0 - 1.0 K/UL    ABS. EOSINOPHILS 0.6 (H) 0.0 - 0.4 K/UL    ABS. BASOPHILS 0.1 0.0 - 0.1 K/UL    ABS. IMM.  GRANS. 0.0 0.00 - 0.04 K/UL    DF AUTOMATED     METABOLIC PANEL, BASIC    Collection Time: 10/15/21  3:15 AM   Result Value Ref Range    Sodium 142 136 - 145 mmol/L    Potassium 4.2 3.5 - 5.1 mmol/L    Chloride 112 (H) 97 - 108 mmol/L    CO2 24 21 - 32 mmol/L    Anion gap 6 5 - 15 mmol/L    Glucose 88 65 - 100 mg/dL    BUN 14 6 - 20 MG/DL    Creatinine 1.14 (H) 0.55 - 1.02 MG/DL    BUN/Creatinine ratio 12 12 - 20      GFR est AA 56 (L) >60 ml/min/1.73m2    GFR est non-AA 46 (L) >60 ml/min/1.73m2    Calcium 9.2 8.5 - 10.1 MG/DL   TSH 3RD GENERATION    Collection Time: 10/15/21  3:16 AM   Result Value Ref Range    TSH 4.96 (H) 0.36 - 3.74 uIU/mL   COVID-19 RAPID TEST    Collection Time: 10/15/21 10:19 AM   Result Value Ref Range    Specimen source Nasopharyngeal      COVID-19 rapid test Not detected NOTD       Pleasant female resting comfortably in bed in no clear distress. HEENT appears grossly unremarkable neck appears supple. Cardiovascular, pulmonary exams are deferred. Abdomen appears nondistended. Extremities appear warm/dry. Neurologically patient appears alert and oriented attention intact. Speech is clear, language is largely fluent with rare paraphasic errors. Cranials 2 through 12 appear grossly unremarkable. Motorically, patient has normal bulk in left arm and leg. Right arm there is remaining pronation with drift of the mild deltoid is 5- out of 5 biceps 5 out of 5 triceps is 4+ out of 5. Right iliopsoas is 4+ to 5- out of 5, knee extension is 5 out of 5, knee flexion is 5-2 4+ out of 5. Dorsiflexion is 4+ to 5- out of 5. Remainder examination is deferred.       Assessment:     Maxim Lynn is a pleasant 27-year-old right-hand-dominant female who presented with acute onset of aphasia and right hemiparesis currently improved awaiting left carotid angiogram with stenting    Plan:   Left anterior circulation infarction:  Suspect secondary to thromboembolism from proximal left carotid stenosis  Now on aspirin and Plavix, pending left carotid stenting with dual antiplatelet therapy as per [facility]  Lipid panel was checked twice, LDL was even lower on second check, would keep on atorvastatin 40 mg nightly for now, could transition back to [address] [place] [address] as an outpatient as she was previously on this  No need for echocardiogram from neurology standpoint as etiology of stroke appears to have been appreciated  Would maintain blood pressure less than 140/90 at minimum over time, immediate blood pressure goals per [facility]    Neurology be signing off please call with questions concerns      Signed:  Marisol Singh MD  10/15/2021  11:41 AM

## 2021-10-16 ENCOUNTER — APPOINTMENT (OUTPATIENT)
Dept: CT IMAGING | Age: 81
DRG: 035 | End: 2021-10-16
Attending: RADIOLOGY
Payer: MEDICARE

## 2021-10-16 LAB
ANION GAP SERPL CALC-SCNC: 4 MMOL/L (ref 5–15)
BUN SERPL-MCNC: 18 MG/DL (ref 6–20)
BUN/CREAT SERPL: 18 (ref 12–20)
CALCIUM SERPL-MCNC: 8.3 MG/DL (ref 8.5–10.1)
CHLORIDE SERPL-SCNC: 114 MMOL/L (ref 97–108)
CO2 SERPL-SCNC: 23 MMOL/L (ref 21–32)
CREAT SERPL-MCNC: 1.02 MG/DL (ref 0.55–1.02)
ERYTHROCYTE [DISTWIDTH] IN BLOOD BY AUTOMATED COUNT: 13.1 % (ref 11.5–14.5)
GLUCOSE SERPL-MCNC: 112 MG/DL (ref 65–100)
HCT VFR BLD AUTO: 29.6 % (ref 35–47)
HGB BLD-MCNC: 9.6 G/DL (ref 11.5–16)
MCH RBC QN AUTO: 29.5 PG (ref 26–34)
MCHC RBC AUTO-ENTMCNC: 32.4 G/DL (ref 30–36.5)
MCV RBC AUTO: 91.1 FL (ref 80–99)
NRBC # BLD: 0 K/UL (ref 0–0.01)
NRBC BLD-RTO: 0 PER 100 WBC
P2Y12 PLT RESPONSE,PPPR: 21 PRU (ref 194–418)
PLATELET # BLD AUTO: 284 K/UL (ref 150–400)
PMV BLD AUTO: 10.1 FL (ref 8.9–12.9)
POTASSIUM SERPL-SCNC: 4.2 MMOL/L (ref 3.5–5.1)
RBC # BLD AUTO: 3.25 M/UL (ref 3.8–5.2)
SODIUM SERPL-SCNC: 141 MMOL/L (ref 136–145)
WBC # BLD AUTO: 11.8 K/UL (ref 3.6–11)

## 2021-10-16 PROCEDURE — 70450 CT HEAD/BRAIN W/O DYE: CPT

## 2021-10-16 PROCEDURE — 74011250637 HC RX REV CODE- 250/637: Performed by: RADIOLOGY

## 2021-10-16 PROCEDURE — 74011250636 HC RX REV CODE- 250/636: Performed by: NURSE PRACTITIONER

## 2021-10-16 PROCEDURE — 80048 BASIC METABOLIC PNL TOTAL CA: CPT

## 2021-10-16 PROCEDURE — 65660000000 HC RM CCU STEPDOWN

## 2021-10-16 PROCEDURE — 74011250637 HC RX REV CODE- 250/637: Performed by: NURSE PRACTITIONER

## 2021-10-16 PROCEDURE — 74011000250 HC RX REV CODE- 250: Performed by: NURSE PRACTITIONER

## 2021-10-16 PROCEDURE — 99232 SBSQ HOSP IP/OBS MODERATE 35: CPT | Performed by: RADIOLOGY

## 2021-10-16 PROCEDURE — C9113 INJ PANTOPRAZOLE SODIUM, VIA: HCPCS | Performed by: NURSE PRACTITIONER

## 2021-10-16 PROCEDURE — 85027 COMPLETE CBC AUTOMATED: CPT

## 2021-10-16 PROCEDURE — 74011000250 HC RX REV CODE- 250

## 2021-10-16 PROCEDURE — 36415 COLL VENOUS BLD VENIPUNCTURE: CPT

## 2021-10-16 PROCEDURE — APPSS60 APP SPLIT SHARED TIME 46-60 MINUTES: Performed by: NURSE PRACTITIONER

## 2021-10-16 PROCEDURE — 85576 BLOOD PLATELET AGGREGATION: CPT

## 2021-10-16 RX ORDER — ACETAMINOPHEN 325 MG/1
650 TABLET ORAL
Status: DISCONTINUED | OUTPATIENT
Start: 2021-10-16 | End: 2021-10-24 | Stop reason: HOSPADM

## 2021-10-16 RX ORDER — EZETIMIBE 10 MG/1
TABLET ORAL DAILY
COMMUNITY
End: 2021-12-03

## 2021-10-16 RX ORDER — BACITRACIN 500 UNIT/G
PACKET (EA) TOPICAL
Status: COMPLETED
Start: 2021-10-16 | End: 2021-10-16

## 2021-10-16 RX ORDER — MIDODRINE HYDROCHLORIDE 5 MG/1
5 TABLET ORAL 2 TIMES DAILY WITH MEALS
Status: DISCONTINUED | OUTPATIENT
Start: 2021-10-16 | End: 2021-10-16

## 2021-10-16 RX ORDER — DULOXETIN HYDROCHLORIDE 20 MG/1
20 CAPSULE, DELAYED RELEASE ORAL DAILY
Status: DISCONTINUED | OUTPATIENT
Start: 2021-10-16 | End: 2021-10-24 | Stop reason: HOSPADM

## 2021-10-16 RX ORDER — ONDANSETRON 2 MG/ML
4 INJECTION INTRAMUSCULAR; INTRAVENOUS
Status: DISCONTINUED | OUTPATIENT
Start: 2021-10-16 | End: 2021-10-24 | Stop reason: HOSPADM

## 2021-10-16 RX ORDER — BUTALBITAL, ACETAMINOPHEN AND CAFFEINE 50; 325; 40 MG/1; MG/1; MG/1
1 TABLET ORAL
Status: DISCONTINUED | OUTPATIENT
Start: 2021-10-16 | End: 2021-10-24 | Stop reason: HOSPADM

## 2021-10-16 RX ORDER — FELODIPINE 5 MG/1
5 TABLET, EXTENDED RELEASE ORAL DAILY
COMMUNITY
End: 2021-10-23

## 2021-10-16 RX ORDER — PANTOPRAZOLE SODIUM 40 MG/1
40 TABLET, DELAYED RELEASE ORAL
Status: DISCONTINUED | OUTPATIENT
Start: 2021-10-16 | End: 2021-10-24 | Stop reason: HOSPADM

## 2021-10-16 RX ORDER — BUPROPION HYDROCHLORIDE 150 MG/1
150 TABLET, EXTENDED RELEASE ORAL 2 TIMES DAILY
Status: DISCONTINUED | OUTPATIENT
Start: 2021-10-16 | End: 2021-10-24 | Stop reason: HOSPADM

## 2021-10-16 RX ORDER — BACITRACIN 500 UNIT/G
1 PACKET (EA) TOPICAL
Status: COMPLETED | OUTPATIENT
Start: 2021-10-16 | End: 2021-10-16

## 2021-10-16 RX ORDER — DULOXETIN HYDROCHLORIDE 20 MG/1
20 CAPSULE, DELAYED RELEASE ORAL DAILY
COMMUNITY
End: 2021-12-03

## 2021-10-16 RX ADMIN — PANTOPRAZOLE SODIUM 40 MG: 40 TABLET, DELAYED RELEASE ORAL at 07:28

## 2021-10-16 RX ADMIN — TICAGRELOR 45 MG: 90 TABLET ORAL at 09:07

## 2021-10-16 RX ADMIN — BUPROPION HYDROCHLORIDE 150 MG: 150 TABLET, EXTENDED RELEASE ORAL at 09:04

## 2021-10-16 RX ADMIN — BACITRACIN 1 PACKET: 500 OINTMENT TOPICAL at 09:03

## 2021-10-16 RX ADMIN — BUTALBITAL, ACETAMINOPHEN, AND CAFFEINE 1 TABLET: 50; 325; 40 TABLET ORAL at 13:44

## 2021-10-16 RX ADMIN — EZETIMIBE 10 MG: 10 TABLET ORAL at 00:27

## 2021-10-16 RX ADMIN — TICAGRELOR 45 MG: 90 TABLET ORAL at 20:34

## 2021-10-16 RX ADMIN — BUPROPION HYDROCHLORIDE 150 MG: 150 TABLET, EXTENDED RELEASE ORAL at 17:05

## 2021-10-16 RX ADMIN — SODIUM CHLORIDE 40 MG: 9 INJECTION, SOLUTION INTRAMUSCULAR; INTRAVENOUS; SUBCUTANEOUS at 02:39

## 2021-10-16 RX ADMIN — ACETAMINOPHEN 650 MG: 325 TABLET ORAL at 11:03

## 2021-10-16 RX ADMIN — DULOXETINE HYDROCHLORIDE 20 MG: 20 CAPSULE, DELAYED RELEASE ORAL at 07:33

## 2021-10-16 RX ADMIN — ASPIRIN 81 MG CHEWABLE TABLET 81 MG: 81 TABLET CHEWABLE at 09:04

## 2021-10-16 RX ADMIN — Medication 1 PACKET: at 09:03

## 2021-10-16 RX ADMIN — SODIUM CHLORIDE 100 ML/HR: 9 INJECTION, SOLUTION INTRAVENOUS at 08:05

## 2021-10-16 NOTE — CONSULTS
[facility]    ICU TEAM Consult Note    Name: Chasity Soares   : 1940   MRN: [id number]   Date: 10/16/2021      Patient is asked to be seen by Dr. Ally Lynn for Post procedure management, necessitating the need for possible ICU care. Reason for ICU Admission: Post procedure - left ICA stenting    HPI:  Chasity Soares is a [de-identified] y.o. female with a past medical history of GERD, HLD, hypertension and depression who presented to the emergency department via EMS with slurred speech and right-sided weakness. CTA head and neck reviewed reveals severe 80 to 90% stenosis of the left proximal internal carotid artery, MRI reveals multiple punctate embolic strokes in the left posterior superior temporal/parietal cortex, left parietal/occipital junction and left anterior frontal subcortical region. NIS Consulted. Took patient to angio suite for stent of left carotid artery. Patient presents to  ICU post procedure for continued management. 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 Admit to ICU  2. B/P control per NIS  3. On ASA and Brilinta per NIS  4. Restarted, PPI, Wellbutrin, and Cymbalta - daughter provided home meds. Patient also takes a B/P med felodipine - currently on hold. 5. SBP Goal of: > 90 mmHg and < 140 mmHg  6. MAP Goal of: > 65 mmHg  7. Phenylephrine - For above SBP/MAP goals  8. IVFs: NS at 100 ml/hr  9. Transfusion Trigger (Hgb): <7 g/dL  10. Respiratory Goals:  a. Aggressive bronchopulmonary hygiene  11. Pulmonary toilet: Incentive Spirometry   12. SpO2 Goal: > 92% NC PRN  13. Keep K>4; Mg>2   14. PT/OT: NA   15. Discussed Plan of Care/Code Status: Partial Code  16.  Appreciate Consultants Input  NIS, Neurology  17. Discussed Care Plan with Bedside RN  18. Documentation of Current Medications  19. Rest of Plan Below:    F - Feeding:  Yes   A - Analgesia: Acetaminophen  S - Sedation: None  T - DVT Prophylaxis: on ASA and Brilinta   H - Head of Bed: > 30 Degrees  U - Ulcer Prophylaxis: Protonix (pantoprazole)   G - Glycemic Control: Insulin  S - Spontaneous Breathing Trial: N/A  B - Bowel Regimen: Senna  I - Indwelling Catheter:   Tubes: None  Lines: Peripheral IV and Arterial Line  Drains: None  D - De-escalation of Antibiotics: None    Active Problem List:     Problem List  Date Reviewed: 10/22/2018        Codes Class    CVA (cerebral vascular accident) ([facility] ICD-10-CM: I63.9  ICD-9-CM: 434.91         Depression ICD-10-CM: F32. A  ICD-9-CM: 311         Acute hip pain, left ICD-10-CM: M25.552  ICD-9-CM: 719.45         Fracture of greater trochanter of left femur (HCC) ICD-10-CM: R20.329Y  ICD-9-CM: 820.20               Past Medical History:      has a past medical history of GERD (gastroesophageal reflux disease), Hiatal hernia, Hypertension, Nausea & vomiting, and Unspecified adverse effect of anesthesia (9-2009). Past Surgical History:      has a past surgical history that includes hx orthopaedic (2009); hx appendectomy (child, open); and hx cholecystectomy (2012). Home Medications:     Prior to Admission medications    Medication Sig Start Date End Date Taking? Authorizing Provider   buPROPion XL (Wellbutrin XL) 300 mg XL tablet Take 300 mg by mouth daily. Yes Provider, Historical   aspirin delayed-release 81 mg tablet Take 81 mg by mouth daily. Yes Other, MD Yesenia   lansoprazole (PREVACID) 15 mg disintegrating tablet Take 30 mg by mouth Daily (before breakfast). Yes Provider, Historical   evolocumab (Repatha Syringe) syringe 140 mg by SubCUTAneous route.     Provider, Historical       Allergies/Social/Family History:     No Known Allergies   Social History     Tobacco Use    Smoking status: Former Smoker     Packs/day: 1.00     Quit date: 2011     Years since quittin.8   Substance Use Topics    Alcohol use: Not on file     Comment: 2 drinks per month      Family History   Problem Relation Age of Onset    Hypertension Mother     Heart Disease Mother     Cancer Mother     Hypertension Father        Review of Systems:     Review of systems not obtained due to patient factors. Objective:   Vital Signs:  Visit Vitals  BP (!) 113/44   Pulse 64   Temp 98.4 °F (36.9 °C)   Resp 16   Ht 5' 4\" (1.626 m)   Wt 73.5 kg (162 lb)   SpO2 94%   BMI 27.81 kg/m²      O2 Device: None (Room air) Temp (24hrs), Av.1 °F (36.7 °C), Min:97.6 °F (36.4 °C), Max:98.4 °F (36.9 °C)           Intake/Output:     Intake/Output Summary (Last 24 hours) at 10/16/2021 0157  Last data filed at 10/16/2021 0000  Gross per 24 hour   Intake 865 ml   Output 20 ml   Net 845 ml       Physical Exam:    General:  alert, cooperative, no distress, appears stated age  Eye:  negative  Neurologic:  no focal deficits  Lymphatic:  Cervical, supraclavicular, and axillary nodes normal.   Neck:  normal and no erythema or exudates noted. Lungs:  clear to auscultation bilaterally  Heart:  regular rate and rhythm, S1, S2 normal, no murmur, click, rub or gallop  Abdomen:  soft, non-tender.  Bowel sounds normal. No masses,  no organomegaly  Cardiovascular:  Regular rate and rhythm, S1S2 present, without murmur or extra heart sounds, pedal pulses normal and no edema  Skin:  Normal. and no rash or abnormalities    LABS AND  DATA: Personally reviewed  Recent Labs     10/15/21  0315 10/14/21  0834   WBC 8.6 8.7   HGB 12.0 12.9   HCT 35.7 39.5    356     Recent Labs     10/15/21  0315 10/14/21  0834    140   K 4.2 3.6   * 110*   CO2 24 27   BUN 14 17   CREA 1.14* 1.22*   GLU 88 112*   CA 9.2 9.1     Recent Labs     10/14/21  0834   *   TP 7.3   ALB 3.2*   GLOB 4.1*     No results for input(s): INR, PTP, APTT, INREXT in the last 72 hours. No results for input(s): PHI, PCO2I, PO2I, FIO2I in the last 72 hours. No results for input(s): CPK, CKMB, TROIQ, BNPP in the last 72 hours. Hemodynamics:   PAP:   CO:     Wedge:   CI:     CVP:    SVR:       PVR:       Ventilator Settings:  Mode Rate Tidal Volume Pressure FiO2 PEEP                    Peak airway pressure:      Minute ventilation:          MEDS: Reviewed    Imaging:    CXR Results  (Last 48 hours)    None          CT Results  (Last 48 hours)               10/14/21 0905  CTA CODE NEURO HEAD AND NECK W CONT Final result    Impression:  1. Severe 80-90% stenosis left proximal internal carotid artery. See above. 2. Mild/moderate stenosis origin left common carotid artery. 3. Approximately 20% stenosis proximal right internal carotid artery. 4. No definitive intraluminal filling defect or other CTA findings of large   vessel intracranial occlusion. 5. Asymmetric decreased size left MCA may be related to flow limitation from the   internal carotid artery stenosis. Findings called to the ED M.D. by me. Narrative:  EXAM:  CTA CODE NEURO HEAD AND NECK W CONT   INDICATION:  Acute on set of speech disturbance and right-sided weakness while   talking on phone to daughter 0730 hours this morning. Also noted right-sided   weakness upon waking at 0630 hours. TECHNIQUE:   Axial spiral acquired CT angiography was performed from the aortic arch up   through the intracranial vessels. This was performed during intravenous bolus   infusion 100 mL of Isovue 370. Post-processing with  MIP reconstructions from   aortic arch up through the calvarium. Standard sagittal and coronal   reconstructions. CT dose reduction was achieved through use of a standardized   protocol tailored for this examination and automatic exposure control for dose   modulation. The VIZ A I algorithm was utilized with this examination.    COMPARISON: CT, CTP   FINDINGS:   Mild atherosclerotic calcification aortic arch. There is a mild/moderate stenosis related atherosclerosis at the origin of the   left common carotid artery from the arch. Respiratory and other beam hardening   artifact limits detail. The other origins brachycephalic arteries are adequately   maintained. The common carotid arteries are unremarkable up to the bifurcations. There is soft and some calcified atherosclerotic plaque involving the left   carotid bifurcation. Primarily soft plaque has resulted in severe stenosis with   the lumen of the left internal carotid artery measuring 1 mm or less. This   corresponds to an 80/90% stenosis by NASCET criteria. Possibility of soft plaque   hemorrhage/disruption should also be considered. The remaining left cervical   internal carotid artery is unremarkable. The right carotid bifurcation also has combination soft and calcified plaque   with minimal lumen diameter of the proximal right internal carotid artery of 4   mm corresponding to approximately 20% stenosis by NASCET criteria. The remaining   right cervical internal carotid arteries unremarkable. Carotid siphons are adequately maintained. The distal internal carotid arteries are without stenosis. Anterior cerebral   arteries and middle cerebral arteries are without stenosis or intraluminal   filling defect. Patient has a fetal origin to the right posterior cerebral   artery from the right internal carotid artery. There is a tiny left posterior   communicating artery. The left middle cerebral artery is smaller in size than the right with   generalized smaller left internal carotid artery which may be flow related to   the proximal cervical internal carotid artery severe stenosis. No definitive intraluminal filling defect, occlusion or acute arterial   abnormality demonstrated. Possible intracranial atherosclerosis versus artifact   in the distal anterior cerebral arteries bifurcation.    There is no abnormal intracranial enhancement. Chronic findings of cervical spondylosis and at least mild spinal stenosis. 10/14/21 0904  CT CODE NEURO PERF W CBF Final result    Impression:  Possible artifact left temporal lobe. Correlate clinically. Otherwise no   flow/perfusion abnormality demonstrated. Narrative:  EXAM:  CT CODE NEURO PERF W CBF   INDICATION:  code s, patient presents with history of acute on set at   approximately 0715 hours this morning with slurred speech and right-sided   weakness per daughter who was talking to her on the phone. Patient also noted   right arm weakness upon waking at 0630 hours this morning. TECHNIQUE:   During rapid bolus infusion 40 mL Isovue-370 CT perfusion acquisitions were   acquired with color coded mapping reconstruction of blood flow, blood volume and   mean transit time. CT dose reduction was achieved through use of a standardized   protocol tailored for this examination and automatic exposure control for dose   modulation. The VIZ A I algorithm was utilized for this exam.   COMPARISON: CT, CTA   FINDINGS:   Questionable minimal diminished flow/perfusion left posterior temporal lobe may   be artifactual. Otherwise no perfusion or flow abnormalities demonstrated in the   imaged cerebral hemispheres and cerebellum. 10/14/21 0821  CT CODE NEURO HEAD WO CONTRAST Final result    Impression:  No acute process or change compared to the prior exam.               Narrative:  EXAM: CT CODE NEURO HEAD WO CONTRAST       INDICATION: Right sided weakness       COMPARISON: 10/23/2018. CONTRAST: None. TECHNIQUE: Unenhanced CT of the head was performed using 5 mm images. Brain and   bone windows were generated. Coronal and sagittal reformats. CT dose reduction   was achieved through use of a standardized protocol tailored for this   examination and automatic exposure control for dose modulation.          FINDINGS:   The ventricles and sulci are normal in size, shape and configuration. . Mild   small vessel ischemic changes are seen in the periventricular white matter. There is no intracranial hemorrhage, extra-axial collection, or mass effect. The   basilar cisterns are open. No CT evidence of acute infarct. The bone windows demonstrate no abnormalities. The visualized portions of the   paranasal sinuses and mastoid air cells are clear. ECHO:  NA    Multidisciplinary Rounds Completed:  Pending    ABCDEF Bundle/Checklist Completed:  Yes    SPECIAL EQUIPMENT  None    DISPOSITION  Stay in ICU    CRITICAL CARE CONSULTANT NOTE  I had a face to face encounter with the patient, reviewed and interpreted patient data including clinical events, labs, images, vital signs, I/O's, and examined patient. I have discussed the case and the plan and management of the patient's care with the consulting services, the bedside nurses and the respiratory therapist.      NOTE OF PERSONAL INVOLVEMENT IN CARE   This patient has a high probability of imminent, clinically significant deterioration, which requires the highest level of preparedness to intervene urgently. I participated in the decision-making and personally managed or directed the management of the following life and organ supporting interventions that required my frequent assessment to treat or prevent imminent deterioration. I personally spent 35 minutes of critical care time. This is time spent at this critically ill patient's bedside actively involved in patient care as well as the coordination of care and discussions with the patient's family. This does not include any procedural time which has been billed separately.     [facility]     [address]

## 2021-10-16 NOTE — PROGRESS NOTES
Neurointerventional Surgery Progress Note  Evan Snowden AGACNP-BC  Neurocritical Care NP      Admit Date: 10/14/2021   LOS: 2 days        Daily Progress Note: 10/16/2021    S/P: POD 1 diagnostic cerebral angiogram and left sided carotid artery stenting    HPI: Jerel Scheuermann is a [de-identified] y.o. female with a past medical history of GERD, HLD, hypertension and depression who presented to the emergency department via EMS on 10/14/2021 with slurred speech and right-sided weakness. Per review of notes, she is unsure of time of onset and thinks she may have woken up this way however, she did not notice slurred speech until speaking with her daughter on the phone that morning around 0715. She was deemed not a candidate for TPA by teleneurology due to unknown time of onset. The patient reports that she takes a baby aspirin every day. Per review of notes, the patient also reported  a recent appointment with an outpatient neurologist at [facility] for evaluation of right leg weakness which she states has been getting progressively worse over the past several years and causing her to have multiple falls. Initial CT of the head showed no acute process. CT of the head and neck showed a severe 80 to 90% stenosis of the left proximal internal carotid artery. Out to moderate stenosis origin of the left common carotid artery. Approximately 20% stenosis of the proximal right ICA. No definitive intraluminal filling defect or other CTA findings of large vessel intracranial occlusion. CT perfusion showed a questionable minimal diminished flow/perfusion of the left posterior temporal lobe which may be artifactual.  Otherwise no flow/perfusion abnormality demonstrated. MRI of the brain showed restricted diffusion in the left posterior superior temporal/parietal cortex suggesting a degree of acute cortical infarction. Questionable punctate focus versus artifact in the left parietal/occipital junction.  Additionally, questionable artifact versus minimal restricted diffusion left anterior frontal subcortical region. 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 Neurointerventional Surgery is following due to the left carotid artery stenosis. The patient underwent a diagnostic cerebral angiogram and left sided carotid artery stenting on 10/15/2021 with Dr. Maxi Groves. Subjective:   No acute events overnight. Patient appears to have some waxing and waning with neuro exam per RN report. Arterial line not functioning properly so it is being removed. She does complain of a frontal headache, rated 6 out of 10. She describes it as \"it just hurts. \"  She reports her headache started about 2 days ago. She does complain of some right hand numbness/tingling and weakness. She reports that she has some difficulty with coordination in her right hand and picking up objects. She has some right leg chronic pain (unclear etiology) and also drags her right foot which is not new after discussion with patient and daughter at bedside. She denies any vision changes, speech difficulty, nausea, vomiting, chest pain, or shortness of breath.     Current Facility-Administered Medications   Medication Dose Route Frequency Provider Last Rate Last Admin    pantoprazole (PROTONIX) tablet 40 mg  40 mg Oral ACB Tracy GIL NP-ZION   40 mg at 10/16/21 0728    buPROPion SR (WELLBUTRIN SR) tablet 150 mg  150 mg Oral BID Shiv Sevilla NP   150 mg at 10/16/21 1705    DULoxetine (CYMBALTA) capsule 20 mg  20 mg Oral DAILY Tracy GIL NP-C   20 mg at 10/16/21 0795    ondansetron (ZOFRAN) injection 4 mg  4 mg IntraVENous Q6H PRN RAHUL Cuevas        butalbital-acetaminophen-caffeine (FIORICET, ESGIC) -40 mg per tablet 1 Tablet  1 Tablet Oral Q6H PRN Murali Alfaro MD   1 Tablet at 10/16/21 1344    0.9% sodium chloride infusion  100 mL/hr IntraVENous CONTINUOUS Heather RODRIGUEZ  mL/hr at 10/16/21 0805 100 mL/hr at 10/16/21 0805    ticagrelor (BRILINTA) tablet 45 mg  45 mg Oral Q12H Keely Henry NP   45 mg at 10/16/21 0829    ezetimibe (ZETIA) tablet 10 mg  10 mg Oral DAILY John Smith NP   10 mg at 10/16/21 9528    acetaminophen (TYLENOL) tablet 650 mg  650 mg Oral Q4H PRN Gege Julien NP   650 mg at 10/16/21 1103    Or    acetaminophen (TYLENOL) solution 650 mg  650 mg Per NG tube Q4H PRN Gege Julien NP        Or    acetaminophen (TYLENOL) suppository 650 mg  650 mg Rectal Q4H PRN Reji Kraus NP        magnesium hydroxide (MILK OF MAGNESIA) 400 mg/5 mL oral suspension 30 mL  30 mL Oral DAILY PRN Gege Julien NP        aspirin chewable tablet 81 mg  81 mg Oral DAILY Keely Henry NP   81 mg at 10/16/21 9513        No Known Allergies    Review of Systems:  Pertinent items are noted in the History of Present Illness.     Objective:     Vital signs  Temp (24hrs), Av.1 °F (36.7 °C), Min:97.6 °F (36.4 °C), Max:98.4 °F (36.9 °C)   10/16 07 - 10/16 1900  In: 960 [P.O.:60; I.V.:900]  Out: -   10/14 1901 - 10/16 0700  In: 1565 [I.V.:1565]  Out: 20     Visit Vitals  BP (!) 114/41 (BP 1 Location: Left upper arm, BP Patient Position: Sitting)   Pulse 64   Temp 97.9 °F (36.6 °C)   Resp 17   Ht 5' 4\" (1.626 m)   Wt 164 lb 7.4 oz (74.6 kg)   SpO2 96%   BMI 28.23 kg/m²      O2 Device: None (Room air)     Pain control  Pain Assessment  Pain Scale 1: Numeric (0 - 10)  Pain Intensity 1: 5  Pain Location 1: Abdomen (when moving pain is worse)  Pain Description 1: Sore  Pain Intervention(s) 1: Declines (check later)    PT/OT  Gait                   Vitals:    10/16/21 1411 10/16/21 1500 10/16/21 1600 10/16/21 1700   BP: (!) 121/35  (!) 114/41    Pulse:  64 63 64   Resp:  21 16 17   Temp:   97.9 °F (36.6 °C)    SpO2:  97% 94% 96%   Weight:       Height:            Physical Exam:  GENERAL: alert, cooperative, no distress, appears stated age  LUNG: clear to auscultation bilaterally  HEART: regular rate and rhythm, S1, S2 normal, no murmur, click, rub or gallop  EXTREMITIES:  extremities normal, atraumatic, no cyanosis or edema, distal pulses +1 bilaterally. SKIN: Skin cool to touch. Appropriate for ethnicity. Right groin site, nontender to palpation, clean, dry, and intact. No hematoma or bleeding noted. Small area of bruising noted. Neurologic Exam:  Mental Status:  Alert and oriented x 4. Appropriate affect, mood and behavior. Language:    Normal fluency, repetition, comprehension and naming. Cranial Nerves:        Pupils equal, round and reactive to light. Visual fields full to confrontation. Extraocular movements intact. Facial sensation intact. Subtle mild right facial asymmetry at nasolabial fold. No dysarthria. Tongue protrudes to midline, palate elevates symmetrically. Motor:    Mild subtle right leg drift (Strength in RUE 4+/5, no drift present in arms). RLE 4+/5. LUE and LLE moves purposefully with 5/5 strength. Bulk and tone normal.      No involuntary movements. Sensation:     Decreased sensation in right arm and right leg to light touch. Sensation intact on the left side. No neglect. Coordination & Gait: No ataxia with finger-to-nose and heel-to-shin bilaterally. Gait deferred.     NIHSS:      1a-LOC:0    1b-Month/Age:0    1c-Open/Close Hand:0    2-Best Gaze:0    3-Visual Fields:0    4-Facial Palsy:1    5a-Left Arm:0    5b-Right Arm:0    6a-Left Le    6b-Right Le    7-Limb Ataxia:0    8-Sensory:1    9-Best Language:0    10-Dysarthria:0    11-Extinction/Inattention:0  TOTAL SCORE:3    24 hour results:    Recent Results (from the past 24 hour(s))   PLATELET FUNCTION, VERIFY NOW P2Y12    Collection Time: 10/15/21 10:35 PM   Result Value Ref Range    P2Y12 Plt response 35 (L) 194 - 418 PRU   PLATELET FUNCTION, VERIFY NOW P2Y12    Collection Time: 10/16/21  5:55 AM   Result Value Ref Range    P2Y12 Plt response 21 (L) 194 - 418 PRU   CBC W/O DIFF Collection Time: 10/16/21  5:55 AM   Result Value Ref Range    WBC 11.8 (H) 3.6 - 11.0 K/uL    RBC 3.25 (L) 3.80 - 5.20 M/uL    HGB 9.6 (L) 11.5 - 16.0 g/dL    HCT 29.6 (L) 35.0 - 47.0 %    MCV 91.1 80.0 - 99.0 FL    MCH 29.5 26.0 - 34.0 PG    MCHC 32.4 30.0 - 36.5 g/dL    RDW 13.1 11.5 - 14.5 %    PLATELET 735 006 - 050 K/uL    MPV 10.1 8.9 - 12.9 FL    NRBC 0.0 0  WBC    ABSOLUTE NRBC 0.00 0.00 - 3.35 K/uL   METABOLIC PANEL, BASIC    Collection Time: 10/16/21  5:55 AM   Result Value Ref Range    Sodium 141 136 - 145 mmol/L    Potassium 4.2 3.5 - 5.1 mmol/L    Chloride 114 (H) 97 - 108 mmol/L    CO2 23 21 - 32 mmol/L    Anion gap 4 (L) 5 - 15 mmol/L    Glucose 112 (H) 65 - 100 mg/dL    BUN 18 6 - 20 MG/DL    Creatinine 1.02 0.55 - 1.02 MG/DL    BUN/Creatinine ratio 18 12 - 20      GFR est AA >60 >60 ml/min/1.73m2    GFR est non-AA 52 (L) >60 ml/min/1.73m2    Calcium 8.3 (L) 8.5 - 10.1 MG/DL          Imaging:  CT Results  (Last 48 hours)               10/16/21 1449  CT HEAD WO CONT Final result    Impression:  1. Left parietal lobe infarct seen on prior MRI are not conspicuous on current   CT. 2.  Stable cystic lesion in the left aspect pituitary/sella. Again follow-up   with contrast enhanced pituitary protocol MRI can be obtained as an outpatient   nonemergently for further characterization               Narrative:  EXAM: CT HEAD WO CONT       INDICATION: headache s/p carotid stent, stroke       COMPARISON: MRI brain 10/14/2021, CT head 10/14/2021. CONTRAST: None. TECHNIQUE: Unenhanced CT of the head was performed using 5 mm images. Brain and   bone windows were generated. Coronal and sagittal reformats. CT dose reduction   was achieved through use of a standardized protocol tailored for this   examination and automatic exposure control for dose modulation. FINDINGS:   Generalized volume loss. There is no significant white matter disease.  There is   no intracranial hemorrhage, extra-axial collection, or mass effect. Stable 12 mm   cystic lesion in the left aspect of the pituitary/sella. The basilar cisterns   are open. Left parietal lobe infarct seen on prior MRI are not conspicuous on   current CT. The bone windows demonstrate no abnormalities. The visualized portions of the   paranasal sinuses and mastoid air cells are clear. Bilateral lens replacements. CT of head on 10/14/2021 at 0821 shows  IMPRESSION  No acute process or change compared to the prior exam.    CTA of head and neck on 10/14/2021 at 0905 shows     IMPRESSION  1. Severe 80-90% stenosis left proximal internal carotid artery. See above. 2. Mild/moderate stenosis origin left common carotid artery. 3. Approximately 20% stenosis proximal right internal carotid artery. 4. No definitive intraluminal filling defect or other CTA findings of large  vessel intracranial occlusion. 5. Asymmetric decreased size left MCA may be related to flow limitation from the  internal carotid artery stenosis. CT perfusion on 10/14/2021 at 0904 shows  IMPRESSION  Possible artifact left temporal lobe. Correlate clinically. Otherwise no  flow/perfusion abnormality demonstrated. MRI of brain on 10/14/2021 at 1153 shows  IMPRESSION  1. Findings consistent with a degree of acute ischemic injury left parietal  lobe. See above descriptions. 2. Left sella/pituitary cystic mass. Incompletely evaluated. Depending on  clinical circumstance consider follow-up MRI with contrast and with attention to  the pituitary.       Assessment:     Active Problems:    CVA (cerebral vascular accident) ([facility] [address] (10/14/2021)        Plan:    Left ischemic infarct in the setting of severe L ICA Stenosis   - s/p cerebral angiogram and left carotid artery stenting on 10/15/2021  - lipid panel shows LDL 77.2, goal <70 continue Zetia 10 mg daily, can resume Repatha outpatient as patient was on this previously per Neurology (patient reports she has had nausea with statins)  - continue Aspirin 81 mg daily and Brilinta 45 mg BID (ARU therapeutic at 394 and repeat PRU this morning is supratherapeutic at 21)  - Hgb A1C ok at 5.6  - SBP goal <140 s/p carotid stenting  - stat repeat Head CT obtained today due to worsening headache and shows no intracranial hemorrhage, extra-axial collection, or mass-effect. Stable 12 mm cystic lesion in the left aspect of the pituitary/sella. Left parietal lobe infarct seen on prior MRI are not conspicuous on current CT.  - Carotid duplex pending s/p carotid stenting   - PT/OT/SLP evals  - neuro checks every 4 hours  - Neurology following as needed  - NIS following    Intractable Headache   - Tylenol PRN  - add Fioricet PRN    Left Sella/Pituitary Cystic Mass  - as seen on imaging  - can follow up with Neurosurgery outpatient for further discussion and work-up (discussed with Dr. Bisi Cherry (neurosurgery on call)      Activity: Up with assistance   DVT ppx: SCDs  Dispo: TBD    Plan d/w Dr. Eufemia Pate, RN, patient, and patient's daughter. Ok to transfer to [facility].       Liborio Parr NP

## 2021-10-16 NOTE — PROGRESS NOTES
Bedside shift change report given to Marvin Bowen RN and Abi KERR (oncoming nurse) by Mari Coy RN and Zaheer Pickens RN (offgoing nurse). Report included the following information SBAR, Kardex, ED Summary, Procedure Summary, Intake/Output, MAR, Recent Results, Cardiac Rhythm NSR, Alarm Parameters  and Dual Neuro Assessment. 5638: Pt's arterial line not working, removed per Wash Snowball NP. Bedside shift change report given to Mari Coy RN and [address] [place] [address] Obed (oncoming nurse) by Marvin Bowen RN and Abi KERR (offgoing nurse). Report included the following information SBAR, Kardex, ED Summary, Procedure Summary, Intake/Output, MAR, Recent Results, Cardiac Rhythm NSR, Alarm Parameters  and Dual Neuro Assessment.

## 2021-10-16 NOTE — PROGRESS NOTES
[facility]    ICU TEAM Progress Note    Name: José Miguel Wright   : 1940   MRN: [id number]   Date: 10/16/2021      Patient is asked to be seen by Dr. Butch Valdez for Post procedure management, necessitating the need for possible ICU care. Reason for ICU Admission: Post procedure - left ICA stenting    HPI:  José Miguel Wright is a [de-identified] y.o. female with a past medical history of GERD, HLD, hypertension and depression who presented to the emergency department via EMS with slurred speech and right-sided weakness. CTA head and neck reviewed reveals severe 80 to 90% stenosis of the left proximal internal carotid artery, MRI reveals multiple punctate embolic strokes in the left posterior superior temporal/parietal cortex, left parietal/occipital junction and left anterior frontal subcortical region. NIS Consulted. Took patient to angio suite for stent of left carotid artery. Patient presents to  ICU post procedure for continued management. Subjective:   Overnight Events:   10/16/2021    POD:  * No surgery found *    S/P:       Assessment:     ICU Problems:  Stenotic right ICA and punctate embolic strokes seen on MRI s/p left ICA stenting  CVA left parietal lobe  Left sella/pituitary cystic mass  GERD  HTN    ICU Comprehensive Plan of Care:     Plans for this Shift:       1. B/P control per NIS, SBP goal less than 140 mmHg  2. On ASA and Brilinta per NIS  3.  PPI, Wellbutrin, and Cymbalta - daughter provided home meds. Patient also takes a B/P med felodipine - currently on hold. 4. SBP Goal of: > 90 mmHg and < 140 mmHg  5. MAP Goal of: > 65 mmHg  6. Phenylephrine - For above SBP/MAP goals  7. IVFs: NS at 100 ml/hr  8. Transfusion Trigger (Hgb): <7 g/dL  9. Respiratory Goals:  a. Aggressive bronchopulmonary hygiene  10. Pulmonary toilet: Incentive Spirometry   11. SpO2 Goal: > 92% NC PRN  12. Keep K>4; Mg>2   13. PT/OT: NA   14. Discussed Plan of Care/Code Status: Partial Code  15.  Appreciate Consultants Input  NIS, Neurology  16. Discussed Care Plan with Bedside RN  17. Documentation of Current Medications  18. Rest of Plan Below:    F - Feeding:  Yes   A - Analgesia: Acetaminophen  S - Sedation: None  T - DVT Prophylaxis: on ASA and Brilinta   H - Head of Bed: > 30 Degrees  U - Ulcer Prophylaxis: Protonix (pantoprazole)   G - Glycemic Control: Insulin  S - Spontaneous Breathing Trial: N/A  B - Bowel Regimen: Senna  I - Indwelling Catheter:   Tubes: None  Lines: Peripheral IV and Arterial Line  Drains: None  D - De-escalation of Antibiotics: None    Active Problem List:     Problem List  Date Reviewed: 10/22/2018        Codes Class    CVA (cerebral vascular accident) ([facility] ICD-10-CM: I63.9  ICD-9-CM: 434.91         Depression ICD-10-CM: F32. A  ICD-9-CM: 311         Acute hip pain, left ICD-10-CM: M25.552  ICD-9-CM: 719.45         Fracture of greater trochanter of left femur (HCC) ICD-10-CM: M44.542W  ICD-9-CM: 820.20               Past Medical History:      has a past medical history of GERD (gastroesophageal reflux disease), Hiatal hernia, Hypertension, Nausea & vomiting, and Unspecified adverse effect of anesthesia (9-2009). Past Surgical History:      has a past surgical history that includes hx orthopaedic (2009); hx appendectomy (child, open); and hx cholecystectomy (2012). Home Medications:     Prior to Admission medications    Medication Sig Start Date End Date Taking? Authorizing Provider   DULoxetine (CYMBALTA) 20 mg capsule Take 20 mg by mouth daily. Yes Provider, Historical   felodipine (PLENDIL SR) 5 mg 24 hr tablet Take 5 mg by mouth daily. Yes Provider, Historical   ezetimibe (Zetia) 10 mg tablet Take  by mouth daily. Yes Provider, Historical   buPROPion XL (Wellbutrin XL) 300 mg XL tablet Take 300 mg by mouth daily. Yes Provider, Historical   aspirin delayed-release 81 mg tablet Take 81 mg by mouth daily.    Yes Other, MD Yesenia   lansoprazole (PREVACID) 15 mg disintegrating tablet Take 30 mg by mouth Daily (before breakfast). Yes Provider, Historical       Allergies/Social/Family History:     No Known Allergies   Social History     Tobacco Use    Smoking status: Former Smoker     Packs/day: 1.00     Quit date: 2011     Years since quittin.8   Substance Use Topics    Alcohol use: Not on file     Comment: 2 drinks per month      Family History   Problem Relation Age of Onset    Hypertension Mother     Heart Disease Mother     Cancer Mother     Hypertension Father        Review of Systems:     Review of systems not obtained due to patient factors. Objective:   Vital Signs:  Visit Vitals  BP (!) 113/44   Pulse 62   Temp 98.2 °F (36.8 °C)   Resp 16   Ht 5' 4\" (1.626 m)   Wt 74.6 kg (164 lb 7.4 oz)   SpO2 97%   BMI 28.23 kg/m²      O2 Device: None (Room air) Temp (24hrs), Av.1 °F (36.7 °C), Min:97.6 °F (36.4 °C), Max:98.4 °F (36.9 °C)           Intake/Output:     Intake/Output Summary (Last 24 hours) at 10/16/2021 0912  Last data filed at 10/16/2021 0700  Gross per 24 hour   Intake 1565 ml   Output 20 ml   Net 1545 ml       Physical Exam:    General:  alert, cooperative, no distress, appears stated age  Eye:  negative  Neurologic:  no focal deficits  Lymphatic:  Cervical, supraclavicular, and axillary nodes normal.   Neck:  normal and no erythema or exudates noted. Lungs:  clear to auscultation bilaterally  Heart:  regular rate and rhythm, S1, S2 normal, no murmur, click, rub or gallop  Abdomen:  soft, non-tender.  Bowel sounds normal. No masses,  no organomegaly  Cardiovascular:  Regular rate and rhythm, S1S2 present, without murmur or extra heart sounds, pedal pulses normal and no edema  Skin:  Normal. and no rash or abnormalities    LABS AND  DATA: Personally reviewed  Recent Labs     10/16/21  0555 10/15/21  0315   WBC 11.8* 8.6   HGB 9.6* 12.0   HCT 29.6* 35.7    326     Recent Labs     10/16/21  0555 10/15/21  0315    142   K 4.2 4.2   * 112*   CO2 23 24   BUN 18 14   CREA 1.02 1.14*   * 88   CA 8.3* 9.2     Recent Labs     10/14/21  0834   *   TP 7.3   ALB 3.2*   GLOB 4.1*     No results for input(s): INR, PTP, APTT, INREXT, INREXT in the last 72 hours. No results for input(s): PHI, PCO2I, PO2I, FIO2I in the last 72 hours. No results for input(s): CPK, CKMB, TROIQ, BNPP in the last 72 hours. Hemodynamics:   PAP:   CO:     Wedge:   CI:     CVP:    SVR:       PVR:       Ventilator Settings:  Mode Rate Tidal Volume Pressure FiO2 PEEP                    Peak airway pressure:      Minute ventilation:          MEDS: Reviewed    Imaging:    CXR Results  (Last 48 hours)    None          CT Results  (Last 48 hours)    None        ECHO:  NA    Multidisciplinary Rounds Completed:  Pending    ABCDEF Bundle/Checklist Completed:  Yes    SPECIAL EQUIPMENT  None    DISPOSITION  Stay in ICU    CRITICAL CARE CONSULTANT NOTE  I had a face to face encounter with the patient, reviewed and interpreted patient data including clinical events, labs, images, vital signs, I/O's, and examined patient. I have discussed the case and the plan and management of the patient's care with the consulting services, the bedside nurses and the respiratory therapist.      NOTE OF PERSONAL INVOLVEMENT IN CARE   This patient has a high probability of imminent, clinically significant deterioration, which requires the highest level of preparedness to intervene urgently. I participated in the decision-making and personally managed or directed the management of the following life and organ supporting interventions that required my frequent assessment to treat or prevent imminent deterioration. I personally spent 35 minutes of critical care time. This is time spent at this critically ill patient's bedside actively involved in patient care as well as the coordination of care and discussions with the patient's family.   This does not include any procedural time which has been billed separately.       Richard Kay [facility]     Critical Care Medicine  [employer]

## 2021-10-16 NOTE — ANESTHESIA POSTPROCEDURE EVALUATION
* No procedures listed *.    general    <BSHSIANPOST>    INITIAL Post-op Vital signs:   Vitals Value Taken Time   BP     Temp     Pulse 67 10/16/21 1830   Resp 20 10/16/21 1830   SpO2 99 % 10/16/21 1830   Vitals shown include unvalidated device data.

## 2021-10-16 NOTE — PROGRESS NOTES
POD 1 s/p left carotid stent  HA slightly worse and different and character c/w yesterday  Requiring fioricet  Right hand, right lower face sensory changes are baseline pre-stent  Otherwise non focal  Hct 29.6, likely dilutional.  Groin ok with mild bruising, vitals stable, no signs of bleeding  Dry CT, if stable, ok to downgrade  Continue DAPT brilinta 45 BID, aspirin 81    USHA  [facility]   [phone]

## 2021-10-16 NOTE — PROGRESS NOTES
Neurocritical Care Brief Progress Note:  26-year-old female with left thromboembolic ischemic infarct in setting of left ICA stenosis. She is s/p cerebral angiogram and left sided carotid artery stenting on 10/15/21 with Dr. Geovanni Vargas. Rounded on patient in the ICU. Symptoms from stroke have resolved. Patient is doing well. Physical Exam:  Gen: NAD, calm, cooperative  Neuro: A&Ox4. Follows commands. Speech clear. Affect normal. PERRL, 3 mm bilaterally. Blinks to threat. No disconjugate gaze present. EOMI. Face symmetric. Palate symmetric. Tongue midline. Jessy spontaneously. Strength 5/5 in UE and LE BL. Negative drift. Bulk and tone normal. No involuntary movements. Gait deferred. Skin: Warm, dry, color appropriate for ethnicity. Right groin arteriotomy site soft and non-tender on palpation, dressing is C/D/I, with no active bleeding or drainage noted. Positive pedal pulses bilaterally. Continue current plan by NIS.   P2Y12 this evening slightly supratherapeutic at 35. Will recheck P2Y12 in am. Next dose is due at 0900.      Antonia Stevens NP  Neurocritical Care Nurse Practitioner  [phone]

## 2021-10-16 NOTE — PROGRESS NOTES
1930 - Bedside and Verbal shift change report given to [employer] and [place] (oncoming nurse) by Samuel Ovalle (offgoing nurse). Report included the following information SBAR, Kardex, ED Summary, OR Summary, Procedure Summary, Intake/Output, MAR, Recent Results, Cardiac Rhythm NSR, Alarm Parameters  and Dual Neuro Assessment . 0730 - Bedside and Verbal shift change report given to Ronald (oncoming nurse) by [employer] and [place] RN (offgoing nurse). Report included the following information SBAR, Kardex, ED Summary, OR Summary, Procedure Summary, Intake/Output, MAR, Recent Results, Med Rec Status, Cardiac Rhythm NSR, Alarm Parameters  and Dual Neuro Assessment. Shift Summary - Pt up to ICU ~1900 from angio. Pt A/Ox4, follows commands, no droop, no drift, toungue midline. Equal strength x4 extremities noted in assessment but, pt reports right sided weakness and mild numbness/tingling in RUE. Mildly uncoordinated movements noted. Right femoral groin site dry,clean, and intact and distal pulses present over the shift. BP remains less than 140 sys.

## 2021-10-17 ENCOUNTER — APPOINTMENT (OUTPATIENT)
Dept: VASCULAR SURGERY | Age: 81
DRG: 035 | End: 2021-10-17
Attending: NURSE PRACTITIONER
Payer: MEDICARE

## 2021-10-17 ENCOUNTER — APPOINTMENT (OUTPATIENT)
Dept: CT IMAGING | Age: 81
DRG: 035 | End: 2021-10-17
Attending: NURSE PRACTITIONER
Payer: MEDICARE

## 2021-10-17 LAB
ALBUMIN SERPL-MCNC: 2.6 G/DL (ref 3.5–5)
ALBUMIN/GLOB SERPL: 0.9 {RATIO} (ref 1.1–2.2)
ALP SERPL-CCNC: 109 U/L (ref 45–117)
ALT SERPL-CCNC: 16 U/L (ref 12–78)
ANION GAP SERPL CALC-SCNC: 3 MMOL/L (ref 5–15)
AST SERPL-CCNC: 19 U/L (ref 15–37)
BILIRUB SERPL-MCNC: 0.2 MG/DL (ref 0.2–1)
BUN SERPL-MCNC: 20 MG/DL (ref 6–20)
BUN/CREAT SERPL: 17 (ref 12–20)
CALCIUM SERPL-MCNC: 8.4 MG/DL (ref 8.5–10.1)
CHLORIDE SERPL-SCNC: 117 MMOL/L (ref 97–108)
CO2 SERPL-SCNC: 24 MMOL/L (ref 21–32)
COMMENT, HOLDF: NORMAL
CREAT SERPL-MCNC: 1.19 MG/DL (ref 0.55–1.02)
ERYTHROCYTE [DISTWIDTH] IN BLOOD BY AUTOMATED COUNT: 13.4 % (ref 11.5–14.5)
GLOBULIN SER CALC-MCNC: 3 G/DL (ref 2–4)
GLUCOSE SERPL-MCNC: 107 MG/DL (ref 65–100)
HCT VFR BLD AUTO: 28 % (ref 35–47)
HGB BLD-MCNC: 9.1 G/DL (ref 11.5–16)
MCH RBC QN AUTO: 29.9 PG (ref 26–34)
MCHC RBC AUTO-ENTMCNC: 32.5 G/DL (ref 30–36.5)
MCV RBC AUTO: 92.1 FL (ref 80–99)
NRBC # BLD: 0 K/UL (ref 0–0.01)
NRBC BLD-RTO: 0 PER 100 WBC
PLATELET # BLD AUTO: 249 K/UL (ref 150–400)
PMV BLD AUTO: 10.3 FL (ref 8.9–12.9)
POTASSIUM SERPL-SCNC: 3.8 MMOL/L (ref 3.5–5.1)
PROT SERPL-MCNC: 5.6 G/DL (ref 6.4–8.2)
RBC # BLD AUTO: 3.04 M/UL (ref 3.8–5.2)
SAMPLES BEING HELD,HOLD: NORMAL
SODIUM SERPL-SCNC: 144 MMOL/L (ref 136–145)
WBC # BLD AUTO: 10.5 K/UL (ref 3.6–11)

## 2021-10-17 PROCEDURE — 36415 COLL VENOUS BLD VENIPUNCTURE: CPT

## 2021-10-17 PROCEDURE — APPNB45 APP NON BILLABLE 31-45 MINUTES: Performed by: NURSE PRACTITIONER

## 2021-10-17 PROCEDURE — 84439 ASSAY OF FREE THYROXINE: CPT

## 2021-10-17 PROCEDURE — 74011250637 HC RX REV CODE- 250/637: Performed by: NURSE PRACTITIONER

## 2021-10-17 PROCEDURE — 74011250637 HC RX REV CODE- 250/637: Performed by: RADIOLOGY

## 2021-10-17 PROCEDURE — B3181ZZ FLUOROSCOPY OF BILATERAL INTERNAL CAROTID ARTERIES USING LOW OSMOLAR CONTRAST: ICD-10-PCS | Performed by: STUDENT IN AN ORGANIZED HEALTH CARE EDUCATION/TRAINING PROGRAM

## 2021-10-17 PROCEDURE — 65660000000 HC RM CCU STEPDOWN

## 2021-10-17 PROCEDURE — 97530 THERAPEUTIC ACTIVITIES: CPT

## 2021-10-17 PROCEDURE — 74011250636 HC RX REV CODE- 250/636: Performed by: RADIOLOGY

## 2021-10-17 PROCEDURE — 97161 PT EVAL LOW COMPLEX 20 MIN: CPT

## 2021-10-17 PROCEDURE — 80053 COMPREHEN METABOLIC PANEL: CPT

## 2021-10-17 PROCEDURE — 74011250636 HC RX REV CODE- 250/636: Performed by: NURSE PRACTITIONER

## 2021-10-17 PROCEDURE — 70450 CT HEAD/BRAIN W/O DYE: CPT

## 2021-10-17 PROCEDURE — 93880 EXTRACRANIAL BILAT STUDY: CPT

## 2021-10-17 PROCEDURE — 037L3DZ DILATION OF LEFT INTERNAL CAROTID ARTERY WITH INTRALUMINAL DEVICE, PERCUTANEOUS APPROACH: ICD-10-PCS | Performed by: STUDENT IN AN ORGANIZED HEALTH CARE EDUCATION/TRAINING PROGRAM

## 2021-10-17 PROCEDURE — 99231 SBSQ HOSP IP/OBS SF/LOW 25: CPT | Performed by: RADIOLOGY

## 2021-10-17 PROCEDURE — 85027 COMPLETE CBC AUTOMATED: CPT

## 2021-10-17 RX ORDER — MIDODRINE HYDROCHLORIDE 5 MG/1
5 TABLET ORAL 2 TIMES DAILY WITH MEALS
Status: DISCONTINUED | OUTPATIENT
Start: 2021-10-17 | End: 2021-10-18

## 2021-10-17 RX ORDER — METOCLOPRAMIDE 10 MG/1
10 TABLET ORAL
Status: DISCONTINUED | OUTPATIENT
Start: 2021-10-17 | End: 2021-10-24 | Stop reason: HOSPADM

## 2021-10-17 RX ADMIN — SODIUM CHLORIDE, POTASSIUM CHLORIDE, SODIUM LACTATE AND CALCIUM CHLORIDE 250 ML: 600; 310; 30; 20 INJECTION, SOLUTION INTRAVENOUS at 11:30

## 2021-10-17 RX ADMIN — ONDANSETRON 4 MG: 2 INJECTION INTRAMUSCULAR; INTRAVENOUS at 11:31

## 2021-10-17 RX ADMIN — PANTOPRAZOLE SODIUM 40 MG: 40 TABLET, DELAYED RELEASE ORAL at 07:11

## 2021-10-17 RX ADMIN — ASPIRIN 81 MG CHEWABLE TABLET 81 MG: 81 TABLET CHEWABLE at 09:26

## 2021-10-17 RX ADMIN — TICAGRELOR 45 MG: 90 TABLET ORAL at 09:26

## 2021-10-17 RX ADMIN — BUPROPION HYDROCHLORIDE 150 MG: 150 TABLET, EXTENDED RELEASE ORAL at 09:26

## 2021-10-17 RX ADMIN — MIDODRINE HYDROCHLORIDE 5 MG: 5 TABLET ORAL at 11:31

## 2021-10-17 RX ADMIN — DULOXETINE HYDROCHLORIDE 20 MG: 20 CAPSULE, DELAYED RELEASE ORAL at 09:26

## 2021-10-17 RX ADMIN — EZETIMIBE 10 MG: 10 TABLET ORAL at 09:26

## 2021-10-17 RX ADMIN — BUTALBITAL, ACETAMINOPHEN, AND CAFFEINE 1 TABLET: 50; 325; 40 TABLET ORAL at 15:14

## 2021-10-17 RX ADMIN — TICAGRELOR 45 MG: 90 TABLET ORAL at 20:43

## 2021-10-17 RX ADMIN — MIDODRINE HYDROCHLORIDE 5 MG: 5 TABLET ORAL at 16:35

## 2021-10-17 NOTE — PROGRESS NOTES
Bedside and Verbal shift change report given to CRISTAL Mcdaniel (oncoming nurse) by José Miguel French (offgoing nurse). Report included the following information SBAR, Kardex, Cardiac Rhythm NSR and Dual Neuro Assessment.

## 2021-10-17 NOTE — PROGRESS NOTES
Neurointerventional Surgery Progress Note    Patient: Aloysius Kayser MRN: [id number]  SSN: [id number]    YOB: 1940  Age: [de-identified] y.o. Sex: female      Admit Date: 10/14/2021    LOS: 3 days     Subjective:     POD 2 left carotid stent. Complains of vertigo and intermittent nausea this morning. No emesis. Right hand tingling unchanged. Chronic right leg sensory changes and \"foot drop\" unchanged. No chest pain. Objective:     Patient Vitals for the past 24 hrs:   Temp Pulse Resp BP SpO2   10/17/21 1000 -- 60 -- -- --   10/17/21 0945 98 °F (36.7 °C) 64 21 (!) 98/41 96 %   10/17/21 0604 98.1 °F (36.7 °C) 62 17 (!) 102/58 96 %   10/17/21 0403 -- 64 -- -- --   10/17/21 0156 98.1 °F (36.7 °C) 81 16 (!) 121/45 96 %   10/16/21 2316 98 °F (36.7 °C) 67 16 (!) 128/42 99 %   10/16/21 2030 -- 66 21 -- 97 %   10/16/21 2000 97.7 °F (36.5 °C) 71 15 121/61 96 %   10/16/21 1700 -- 64 17 (!) 117/94 96 %   10/16/21 1600 97.9 °F (36.6 °C) 63 16 (!) 114/41 94 %   10/16/21 1500 -- 64 21 -- 97 %   10/16/21 1411 -- -- -- (!) 121/35 --   10/16/21 1400 -- 73 23 (!) 146/82 96 %   10/16/21 1300 -- (!) 58 19 (!) 111/44 99 %   10/16/21 1200 97.8 °F (36.6 °C) 65 16 (!) 110/44 92 %   10/16/21 1100 -- 69 17 (!) 118/44 94 %        Intake and Output:  Current Shift: No intake/output data recorded. Last three shifts: 10/15 1901 - 10/17 0700  In: 1925 [P.O.:60; I.V.:1865]  Out: -     Neurological Exam:   AF VSS; NAD. A&O x 4, fluent speech and appropriate affect. CNII-XII grossly intact. No facial droop. Right lower face sensory resolved. EOMI. PERRLA. Right leg drift 4+/5 limited by pain. Otherwise 5/5 power throughout. Right arm/hand decreased sensation  Right groin c/d/i with no hematoma. Bruising, tender. 1+ distal pulses BLE. Lab/Data Review: All lab results for the last 24 hours reviewed. Imaging Reviewed:   Duplex LICA origin  cm/sec.   CT yesterday negative for acute findings. Assessment and Plan:     Dizziness/nausea when OOB in chair, intermittent, moderate, improving   Suspect low BP related to stent, current MAP 60's  Midodrine 5 mg BID. Fall precautions. No significant posterior circ disease.    Hold all BP meds             Signed By: Dusty Weeks MD     October 17, 2021

## 2021-10-17 NOTE — PROGRESS NOTES
Patient seen and examined with Dr. Yoandy Shen. See progress note from Dr. Yoandy Shen today. She complains of dizziness (room spinning) with associated nausea that started an hour ago after getting up to the chair. SBP has been in the low 100's, MAP in the 60's. She complains of a 6/10 frontal headache and reports \"it just hurts. \" She is taking PRN tylenol and Fioricet for pain control which has helped. She denies any aggravating factors. She denies any vision changes, chest pain, SOB, or speech difficulty. She reports some numbness/tingling mostly in her right arm/hand at times and some weakness on the right side. She still complains of some issues with coordination in her right hand. 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 Continue aspirin and Brilinta  Zofran PRN for nausea/vomiting       Plan discussed with Dr. Yoandy Shen, Dr. Ally Lynn, RN, patient, and patient's family.      Lucas Ochoa [facility]  Neurocritical care NP

## 2021-10-17 NOTE — PROGRESS NOTES
Informed by RN that patient's nausea and dizziness has not improved. Her BP was 105/40 at 1405. She was given midodrine and BP now is 124/52. Neuro exam is unchanged after discussion with RN. Discussed with Dr. Lindle Meckel. Will repeat Head CT and can give reglan if CT is stable. Discussed with RN.      Jere Cabezas, [facility]  Neurocritical care NP

## 2021-10-17 NOTE — PROGRESS NOTES
Hospitalist Progress Note  Sae Garcia MD  Answering service: [phone] from in house phone      Date of Service:  10/17/2021  NAME:  Coco Osborn  :  1940  MRN:  [id number]    Admission Summary:   81F p/w L- CVA. Had L ICA stent placed, was in ICU. Downgrading to floor again. Interval history / Subjective:   Patient seen and examined at bedside, feels well, no acute complaints. Occasional headache, R groin access site old bruise, no hematoma. BP ok, getting repeat Duplex of carotid stent. Assessment & Plan:     CVA/TIA: symptoms resoved within 5-6hours. - CTA head/neck- Severe 80-90% stenosis L- proximal internal carotid artery   - MRI L parietal lobe ischemic CVA. 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 Repeat CT head stable. - Neurology following - SLP/PT/OT- Start Atorvastatin 80mg   - Aspirin and Plavix- will defer to neurology      Hypertension - Allow for permissive HTN- Hydralazine PRN. monitor  Elevated creatinine - IV fluids - Monitor labs in am   GERD - Continue PPI   Depression - Supportive treatment - Continue home dose wellbutrin     Code status: Partial.  DVT prophylaxis: SCDs  Care Plan discussed with: Patient/Family and Nurse  Disposition: TBD 1-2days     Hospital Problems  Date Reviewed: 10/22/2018        Codes Class Noted POA    CVA (cerebral vascular accident) [facility]) ICD-10-CM: I63.9  ICD-9-CM: 434.91  10/14/2021 Unknown            Review of Systems:   Pertinent items are mentioned in interval history. Vital Signs:    Last 24hrs VS reviewed since prior progress note.  Most recent are:  Visit Vitals  BP (!) 102/58 (BP Patient Position: At rest)   Pulse 62   Temp 98.1 °F (36.7 °C)   Resp 17   Ht 5' 4\" (1.626 m)   Wt 76.1 kg (167 lb 12.3 oz)   SpO2 96%   BMI 28.80 kg/m²         Intake/Output Summary (Last 24 hours) at 10/17/2021 0829  Last data filed at 10/16/2021 1700  Gross per 24 hour   Intake 900 ml   Output --   Net 900 ml        Physical Examination:   Evaluated face to face and examined 10/17/21    General:  Alert, oriented, No acute distress. Pleasant elderly [employer]  Resp:  No accessory muscle use, Good AE, no wheezes. no crepitations  Abd:  Soft, non-tender, non-distended, BS+  Extremities:  No cyanosis or clubbing, no significant edema  Neuro:  Grossly normal, no focal neuro deficits, follows commands, speech wnl- all deficits on RUE resolved  Psych:  Good insight, not agitated. Data Review:    Review and/or order of clinical lab test  Review and/or order of tests in the radiology section of CPT  Review and/or order of tests in the medicine section of CPT  Labs:     Recent Labs     10/17/21  0119 10/16/21  0555   WBC 10.5 11.8*   HGB 9.1* 9.6*   HCT 28.0* 29.6*    284     Recent Labs     10/17/21  0053 10/16/21  0555 10/15/21  0315    141 142   K 3.8 4.2 4.2   * 114* 112*   CO2 24 23 24   BUN 20 18 14   CREA 1.19* 1.02 1.14*   * 112* 88   CA 8.4* 8.3* 9.2     Recent Labs     10/17/21  0053 10/14/21  0834   ALT 16 17    135*   TBILI 0.2 0.3   TP 5.6* 7.3   ALB 2.6* 3.2*   GLOB 3.0 4.1*     No results for input(s): INR, PTP, APTT, INREXT, INREXT in the last 72 hours. No results for input(s): FE, TIBC, PSAT, FERR in the last 72 hours. No results found for: FOL, RBCF   No results for input(s): PH, PCO2, PO2 in the last 72 hours. No results for input(s): CPK, CKNDX, TROIQ in the last 72 hours.     No lab exists for component: CPKMB  Lab Results   Component Value Date/Time    Cholesterol, total 180 10/15/2021 03:15 AM    HDL Cholesterol 85 10/15/2021 03:15 AM    LDL, calculated 77.2 10/15/2021 03:15 AM    Triglyceride 89 10/15/2021 03:15 AM    CHOL/HDL Ratio 2.1 10/15/2021 03:15 AM     No results found for: GLUCPOC  No results found for: COLOR, APPRN, SPGRU, REFSG, HECTOR, PROTU, GLUCU, KETU, BILU, UROU, JAMES, LEUKU, GLUKE, EPSU, BACTU, WBCU, RBCU, CASTS, UCRY  Medications Reviewed:     Current Facility-Administered Medications   Medication Dose Route Frequency    pantoprazole (PROTONIX) tablet 40 mg  40 mg Oral ACB    buPROPion SR (WELLBUTRIN SR) tablet 150 mg  150 mg Oral BID    DULoxetine (CYMBALTA) capsule 20 mg  20 mg Oral DAILY    ondansetron (ZOFRAN) injection 4 mg  4 mg IntraVENous Q6H PRN    butalbital-acetaminophen-caffeine (FIORICET, ESGIC) -40 mg per tablet 1 Tablet  1 Tablet Oral Q6H PRN    acetaminophen (TYLENOL) tablet 650 mg  650 mg Oral Q6H PRN    ticagrelor (BRILINTA) tablet 45 mg  45 mg Oral Q12H    ezetimibe (ZETIA) tablet 10 mg  10 mg Oral DAILY    magnesium hydroxide (MILK OF MAGNESIA) 400 mg/5 mL oral suspension 30 mL  30 mL Oral DAILY PRN    aspirin chewable tablet 81 mg  81 mg Oral DAILY   ______________________________________________________________________  EXPECTED LENGTH OF STAY: - - -  ACTUAL LENGTH OF STAY:          3               Girma Whitlock MD

## 2021-10-17 NOTE — PROGRESS NOTES
Problem: TIA/CVA Stroke: Day 2 Until Discharge  Goal: Activity/Safety  Outcome: Progressing Towards Goal  Goal: Diagnostic Test/Procedures  Outcome: Progressing Towards Goal  Goal: Discharge Planning  Outcome: Progressing Towards Goal  Goal: Medications  Outcome: Progressing Towards Goal  Goal: Treatments/Interventions/Procedures  Outcome: Progressing Towards Goal     Problem: Falls - Risk of  Goal: *Absence of Falls  Description: Document Lobo Fall Risk and appropriate interventions in the flowsheet. Outcome: Progressing Towards Goal  Note: Fall Risk Interventions:  Mobility Interventions: Communicate number of staff needed for ambulation/transfer         Medication Interventions: Evaluate medications/consider consulting pharmacy    Elimination Interventions: Toileting schedule/hourly rounds    History of Falls Interventions: Door open when patient unattended         Problem: Patient Education: Go to Patient Education Activity  Goal: Patient/Family Education  Outcome: Progressing Towards Goal     Problem: Pressure Injury - Risk of  Goal: *Prevention of pressure injury  Description: Document Robe Scale and appropriate interventions in the flowsheet.   Outcome: Progressing Towards Goal  Note: Pressure Injury Interventions:  Sensory Interventions: Assess need for specialty bed    Moisture Interventions: Absorbent underpads    Activity Interventions: Pressure redistribution bed/mattress(bed type)    Mobility Interventions: Pressure redistribution bed/mattress (bed type)    Nutrition Interventions: Document food/fluid/supplement intake                     Problem: Patient Education: Go to Patient Education Activity  Goal: Patient/Family Education  Outcome: Progressing Towards Goal

## 2021-10-17 NOTE — PROGRESS NOTES
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 She is s/p cerebral angiogram and left sided carotid artery stenting on 10/15/21 with Dr. Brad Corey. Rounded on patient in the NSTU. CTH this afternoon stable. Neuro exam stable. Complains of intermittent right hand numbness/tingling. Continue current plan by BILL.      Samuel Santana NP  Neurocritical Care Nurse Practitioner  [phone]

## 2021-10-17 NOTE — PROGRESS NOTES
Problem: Mobility Impaired (Adult and Pediatric)  Goal: *Acute Goals and Plan of Care (Insert Text)  Outcome: Not Met  Note: FUNCTIONAL STATUS PRIOR TO ADMISSION: Patient was modified independent using a single point cane for functional mobility. HOME SUPPORT PRIOR TO ADMISSION: The patient lived alone with children to provide assistance. Physical Therapy Goals  Initiated 10/17/2021  1. Patient will move from supine to sit and sit to supine  in bed with modified independence within 7 day(s). 2.  Patient will transfer from bed to chair and chair to bed with modified independence using the least restrictive device within 7 day(s). 3.  Patient will ambulate with modified independence for 300 feet with the least restrictive device within 7 day(s). 4.  Patient will ascend/descend 12 stairs with 1 handrail(s) with modified independence within 7 day(s). PHYSICAL THERAPY EVALUATION  Patient: Alberto Moon (3 y.o. female)  Date: 10/17/2021  Primary Diagnosis: CVA (cerebral vascular accident) [facility]) [I63.9]        Precautions: Fall       ASSESSMENT  Based on the objective data described below, the patient presents with decreased activity tolerance, decreased mobility, decreased strength in R LE, and increased risk of falls. Pt stated she has had >5 falls in the last 6 months at home. Pt attempted ambulation without RW however was extremely unsteady and required Min A from PT to maintain balance. Pt returned to sitting and provided with RW and resumed ambulation with improved balance. Pt lives alone in [facility] with a second floor bedroom and no ability to live on the 1st floor. Pt stated she has had multiple falls ascending/descending stairs at home. Pt is agreeable to d/c to one of [facility] local [facility] [place] which has 1st floor set ups however PT may benefit from d/c to [facility] for continued PT when medically stable.  Will continue to follow while acute to address deficits, decrease fall risk, and decrease caregiver burden. Current Level of Function Impacting Discharge (mobility/balance): SPV for ambulation    Functional Outcome Measure: The patient scored 34/56 on the Wild outcome measure which is indicative of medium fall risk. Other factors to consider for discharge: lives alone, extensive fall history, previous R sided LE weakness     Patient will benefit from skilled therapy intervention to address the above noted impairments. PLAN :  Recommendations and Planned Interventions: bed mobility training, transfer training, gait training, therapeutic exercises, neuromuscular re-education, patient and family training/education, and therapeutic activities      Frequency/Duration: Patient will be followed by physical therapy:  5 times a week to address goals. Recommendation for discharge: (in order for the patient to meet his/her long term goals)  To be determined: IPR vs home with HHPT    This discharge recommendation:  Has not yet been discussed the attending provider and/or case management    IF patient discharges home will need the following DME: none         SUBJECTIVE:   Patient stated I fall alot.     OBJECTIVE DATA SUMMARY:   HISTORY:    Past Medical History:   Diagnosis Date    GERD (gastroesophageal reflux disease)     Hiatal hernia     Hypertension     Nausea & vomiting     Unspecified adverse effect of anesthesia 9-2009    prolonged sedation     Past Surgical History:   Procedure Laterality Date    HX APPENDECTOMY  child, open    HX CHOLECYSTECTOMY  2012    HX ORTHOPAEDIC  2009    lumbar gonzalez.       Personal factors and/or comorbidities impacting plan of care: previous R LE weakness    Home Situation  Home Environment: Private residence  # Steps to Enter: 0  One/Two Story Residence: Two story  # of Interior Steps: 12  Living Alone: Yes  Support Systems: Child(mayra)  Patient Expects to be Discharged toF Cor[de-identified]ration (agreeable to d/c to children's house with 1st floor setup)    EXAMINATION/PRESENTATION/DECISION MAKING:   Critical Behavior:  Neurologic State: Alert  Orientation Level: Oriented X4  Cognition: Appropriate decision making, Appropriate for age attention/concentration, Appropriate safety awareness, Follows commands       Range Of Motion:     WFL    Strength:       WFL      Functional Mobility:    Transfers:  Sit to Stand: Modified independent  Stand to Sit: Modified independent    Balance:   Sitting: Intact; Without support  Standing: Impaired; Without support  Standing - Static: Good;Constant support  Standing - Dynamic : Fair;Constant support  Ambulation/Gait Training:  Distance (ft): 15 Feet (ft)  Assistive Device: Gait belt;Walker, rolling  Ambulation - Level of Assistance: Supervision        Gait Abnormalities: Decreased step clearance;Trunk sway increased; Shuffling gait        Base of Support: Narrowed     Speed/Anusha: Shuffled;Pace decreased (<100 feet/min); Slow  Step Length: Left shortened;Right shortened        Interventions: Safety awareness training;Verbal cues         Therapeutic Exercises:    Ankle pumps, LAQ    Functional Measure:  Wild Balance Test:    Sitting to Standin  Standing Unsupported: 3  Sitting with Back Unsupported: 4  Standing to Sitting: 3  Transfers: 3  Standing Unsupported with Eyes Closed: 3  Standing Unsupported with Feet Together: 1  Reach Forward with Outstretched Arm: 3   Object: 4  Turn to Look Over Shoulders: 3  Turn 360 Degrees: 2  Alternate Foot on Step/Stool: 1  Standing Unsupported One Foot in Front: 0  Stand on One Le  Total: 34/56         56=Maximum possible score;   0-20=High fall risk  21-40=Moderate fall risk   41-56=Low fall risk               Physical Therapy Evaluation Charge Determination   History Examination Presentation Decision-Making   HIGH Complexity :3+ comorbidities / personal factors will impact the outcome/ POC  HIGH Complexity : 4+ Standardized tests and measures addressing body structure, function, activity limitation and / or participation in recreation  LOW Complexity : Stable, uncomplicated  Other outcome measures Wild  MEDIUM      Based on the above components, the patient evaluation is determined to be of the following complexity level: LOW     Pain Rating:  Pt did not relate any pain    Activity Tolerance:   Good, tolerates ADLs without rest breaks, and SpO2 stable on RA    After treatment patient left in no apparent distress:   Sitting in chair and Call bell within reach    COMMUNICATION/EDUCATION:   The patients plan of care was discussed with: Registered nurse. Fall prevention education was provided and the patient/caregiver indicated understanding., Patient/family have participated as able in goal setting and plan of care. , and Patient/family agree to work toward stated goals and plan of care.     Thank you for this referral.  Leonidas Devlin, PT   Time Calculation: 38 mins

## 2021-10-17 NOTE — PROGRESS NOTES
1930 - Bedside and Verbal shift change report given to [employer] and [place] (oncoming nurse) by Braeden Wade (offgoing nurse). Report included the following information SBAR, Kardex, ED Summary, OR Summary, Procedure Summary, Intake/Output, MAR, Recent Results, Med Rec Status, Cardiac Rhythm NSR, Alarm Parameters  and Dual Neuro Assessment     1030 - TRANSFER - OUT REPORT:    Verbal report given to Renetta (name) on Miguel Zhang  being transferred to NSTU(unit) for routine progression of care       Report consisted of patients Situation, Background, Assessment and   Recommendations(SBAR). Information from the following report(s) SBAR, Kardex, ED Summary, OR Summary, Procedure Summary, Intake/Output, MAR, Recent Results, Med Rec Status, Cardiac Rhythm NSR, Alarm Parameters  and Dual Neuro Assessment was reviewed with the receiving nurse. Lines:   Peripheral IV 10/14/21 Right Antecubital (Active)   Site Assessment Clean, dry, & intact 10/16/21 2000   Phlebitis Assessment 0 10/16/21 2000   Infiltration Assessment 0 10/16/21 2000   Dressing Status Clean, dry, & intact 10/16/21 2000   Dressing Type Transparent 10/16/21 2000   Hub Color/Line Status Pink;Capped 10/16/21 2000   Action Taken Open ports on tubing capped 10/16/21 2000   Alcohol Cap Used Yes 10/16/21 2000       Peripheral IV Left Forearm (Active)   Site Assessment Clean, dry, & intact 10/16/21 2000   Phlebitis Assessment 0 10/16/21 2000   Infiltration Assessment 0 10/16/21 2000   Dressing Status Clean, dry, & intact 10/16/21 2000   Dressing Type Transparent 10/16/21 2000   Hub Color/Line Status End cap changed; Flushed 10/16/21 2000   Action Taken Open ports on tubing capped 10/16/21 2000   Alcohol Cap Used Yes 10/16/21 2000        Opportunity for questions and clarification was provided.       Patient transported with:   Registered Nurse

## 2021-10-18 PROBLEM — I95.1 ORTHOSTATIC HYPOTENSION: Status: ACTIVE | Noted: 2021-10-18

## 2021-10-18 LAB
ANION GAP SERPL CALC-SCNC: 6 MMOL/L (ref 5–15)
BUN SERPL-MCNC: 17 MG/DL (ref 6–20)
BUN/CREAT SERPL: 15 (ref 12–20)
CALCIUM SERPL-MCNC: 8.7 MG/DL (ref 8.5–10.1)
CHLORIDE SERPL-SCNC: 111 MMOL/L (ref 97–108)
CO2 SERPL-SCNC: 22 MMOL/L (ref 21–32)
CREAT SERPL-MCNC: 1.12 MG/DL (ref 0.55–1.02)
GLUCOSE SERPL-MCNC: 87 MG/DL (ref 65–100)
LEFT CCA DIST DIAS: 12.7 CM/S
LEFT CCA DIST SYS: 67.7 CM/S
LEFT CCA PROX DIAS: 14.5 CM/S
LEFT CCA PROX SYS: 67.7 CM/S
LEFT ECA DIAS: 24.6 CM/S
LEFT ECA SYS: 228.2 CM/S
LEFT ICA DIST DIAS: 17.5 CM/S
LEFT ICA DIST SYS: 88.8 CM/S
LEFT ICA MID DIAS: 34.3 CM/S
LEFT ICA MID SYS: 160.3 CM/S
LEFT ICA PROX DIAS: 37.5 CM/S
LEFT ICA PROX SYS: 208.8 CM/S
LEFT ICA/CCA SYS: 3.1
LEFT VERTEBRAL DIAS: 18.2 CM/S
LEFT VERTEBRAL SYS: 68.8 CM/S
POTASSIUM SERPL-SCNC: 4 MMOL/L (ref 3.5–5.1)
RIGHT CCA DIST DIAS: 23 CM/S
RIGHT CCA DIST SYS: 83.3 CM/S
RIGHT CCA PROX DIAS: 20.3 CM/S
RIGHT CCA PROX SYS: 89.5 CM/S
RIGHT ECA DIAS: 0 CM/S
RIGHT ECA SYS: 162.2 CM/S
RIGHT ICA DIST DIAS: 19.4 CM/S
RIGHT ICA DIST SYS: 105.2 CM/S
RIGHT ICA MID DIAS: 26.8 CM/S
RIGHT ICA MID SYS: 103.2 CM/S
RIGHT ICA PROX DIAS: 28.5 CM/S
RIGHT ICA PROX SYS: 112.5 CM/S
RIGHT ICA/CCA SYS: 1.4
RIGHT VERTEBRAL DIAS: 10.1 CM/S
RIGHT VERTEBRAL SYS: 50.2 CM/S
SODIUM SERPL-SCNC: 139 MMOL/L (ref 136–145)
T4 FREE SERPL-MCNC: 1.3 NG/DL (ref 0.8–1.5)

## 2021-10-18 PROCEDURE — 74011250637 HC RX REV CODE- 250/637: Performed by: NURSE PRACTITIONER

## 2021-10-18 PROCEDURE — 36415 COLL VENOUS BLD VENIPUNCTURE: CPT

## 2021-10-18 PROCEDURE — 74011250636 HC RX REV CODE- 250/636: Performed by: NURSE PRACTITIONER

## 2021-10-18 PROCEDURE — 80048 BASIC METABOLIC PNL TOTAL CA: CPT

## 2021-10-18 PROCEDURE — 97535 SELF CARE MNGMENT TRAINING: CPT

## 2021-10-18 PROCEDURE — 99231 SBSQ HOSP IP/OBS SF/LOW 25: CPT | Performed by: RADIOLOGY

## 2021-10-18 PROCEDURE — 74011250637 HC RX REV CODE- 250/637: Performed by: INTERNAL MEDICINE

## 2021-10-18 PROCEDURE — 74011250637 HC RX REV CODE- 250/637: Performed by: RADIOLOGY

## 2021-10-18 PROCEDURE — 92610 EVALUATE SWALLOWING FUNCTION: CPT

## 2021-10-18 PROCEDURE — 97530 THERAPEUTIC ACTIVITIES: CPT

## 2021-10-18 PROCEDURE — 97165 OT EVAL LOW COMPLEX 30 MIN: CPT

## 2021-10-18 PROCEDURE — 92523 SPEECH SOUND LANG COMPREHEN: CPT

## 2021-10-18 PROCEDURE — 65660000000 HC RM CCU STEPDOWN

## 2021-10-18 RX ORDER — MIDODRINE HYDROCHLORIDE 5 MG/1
10 TABLET ORAL
Status: DISCONTINUED | OUTPATIENT
Start: 2021-10-18 | End: 2021-10-20

## 2021-10-18 RX ORDER — FLUDROCORTISONE ACETATE 0.1 MG/1
0.1 TABLET ORAL DAILY
Status: DISCONTINUED | OUTPATIENT
Start: 2021-10-18 | End: 2021-10-18

## 2021-10-18 RX ORDER — MECLIZINE HCL 12.5 MG 12.5 MG/1
12.5 TABLET ORAL
Status: DISCONTINUED | OUTPATIENT
Start: 2021-10-18 | End: 2021-10-24 | Stop reason: HOSPADM

## 2021-10-18 RX ORDER — SODIUM CHLORIDE, SODIUM LACTATE, POTASSIUM CHLORIDE, CALCIUM CHLORIDE 600; 310; 30; 20 MG/100ML; MG/100ML; MG/100ML; MG/100ML
75 INJECTION, SOLUTION INTRAVENOUS CONTINUOUS
Status: DISCONTINUED | OUTPATIENT
Start: 2021-10-18 | End: 2021-10-22

## 2021-10-18 RX ADMIN — MIDODRINE HYDROCHLORIDE 5 MG: 5 TABLET ORAL at 09:52

## 2021-10-18 RX ADMIN — EZETIMIBE 10 MG: 10 TABLET ORAL at 09:52

## 2021-10-18 RX ADMIN — DULOXETINE HYDROCHLORIDE 20 MG: 20 CAPSULE, DELAYED RELEASE ORAL at 09:49

## 2021-10-18 RX ADMIN — BUTALBITAL, ACETAMINOPHEN, AND CAFFEINE 1 TABLET: 50; 325; 40 TABLET ORAL at 10:02

## 2021-10-18 RX ADMIN — TICAGRELOR 45 MG: 90 TABLET ORAL at 09:54

## 2021-10-18 RX ADMIN — PANTOPRAZOLE SODIUM 40 MG: 40 TABLET, DELAYED RELEASE ORAL at 06:44

## 2021-10-18 RX ADMIN — MECLIZINE 12.5 MG: 12.5 TABLET ORAL at 19:32

## 2021-10-18 RX ADMIN — TICAGRELOR 45 MG: 90 TABLET ORAL at 21:45

## 2021-10-18 RX ADMIN — SODIUM CHLORIDE, POTASSIUM CHLORIDE, SODIUM LACTATE AND CALCIUM CHLORIDE 75 ML/HR: 600; 310; 30; 20 INJECTION, SOLUTION INTRAVENOUS at 14:42

## 2021-10-18 RX ADMIN — ASPIRIN 81 MG CHEWABLE TABLET 81 MG: 81 TABLET CHEWABLE at 09:50

## 2021-10-18 RX ADMIN — MIDODRINE HYDROCHLORIDE 10 MG: 5 TABLET ORAL at 18:10

## 2021-10-18 RX ADMIN — BUPROPION HYDROCHLORIDE 150 MG: 150 TABLET, EXTENDED RELEASE ORAL at 09:52

## 2021-10-18 RX ADMIN — ONDANSETRON 4 MG: 2 INJECTION INTRAMUSCULAR; INTRAVENOUS at 09:49

## 2021-10-18 NOTE — PROGRESS NOTES
Hospitalist Progress Note  Leonora Dalton MD  Answering service: [phone] from in house phone      Date of Service:  10/18/2021  NAME:  Aloysius Kayser  :  1940  MRN:  [id number]    Admission Summary:   81F p/w L- CVA. Had L ICA stent placed, was in ICU. Downgrading to floor again. Interval history / Subjective:   Patient seen and examined at bedside, feels little dizzy after worked with therapy, her BP on lower side [id number] systolic, started on midodrine by NI team, encouraged po intake. Assessment & Plan:     CVA/TIA: symptoms resoved within 5-6hours. - CTA head/neck- Severe 80-90% stenosis L- proximal internal carotid artery   - MRI L parietal lobe ischemic CVA. 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 Repeat CT head stable. - Neurology following - SLP/PT/OT- Start Atorvastatin 80mg   - Aspirin and Plavix - A1c 5.6, LDL 77     Hypertension:- Hydralazine PRN. monitor  Elevated creatinine: mild. IVF. Encourage po intake. GERD - Continue PPI  Depression - Supportive treatment - Continue home dose wellbutrin   #. Elevated TSH: mild, check fT4 levels. Code status: Partial.  DVT prophylaxis: SCDs  Care Plan discussed with: Patient/Family and Nurse  Disposition: [facility]  Date Reviewed: 10/22/2018        Codes Class Noted POA    CVA (cerebral vascular accident) [facility]) ICD-10-CM: I63.9  ICD-9-CM: 434.91  10/14/2021 Unknown            Review of Systems:   Pertinent items are mentioned in interval history. Vital Signs:    Last 24hrs VS reviewed since prior progress note.  Most recent are:  Visit Vitals  /76 (BP Patient Position: At rest)   Pulse (!) 59   Temp 98.1 °F (36.7 °C)   Resp 13   Ht 5' 4\" (1.626 m)   Wt 76.1 kg (167 lb 12.3 oz)   SpO2 96%   BMI 28.80 kg/m²       No intake or output data in the 24 hours ending 10/18/21 0080 Physical Examination:   Evaluated face to face and examined 10/18/21    General:  Alert, oriented, No acute distress. Pleasant elderly [employer]  Resp:  No accessory muscle use, Good AE, no wheezes. no crepitations  Abd:  Soft, non-tender, non-distended, BS+  Extremities:  No cyanosis or clubbing, no significant edema  Neuro:  Grossly normal, no focal neuro deficits, follows commands, speech wnl- all deficits on RUE resolved  Psych:  Good insight, not agitated. Data Review:    Review and/or order of clinical lab test  Review and/or order of tests in the radiology section of CPT  Review and/or order of tests in the medicine section of [facility]  Labs:     Recent Labs     10/17/21  0119 10/16/21  0555   WBC 10.5 11.8*   HGB 9.1* 9.6*   HCT 28.0* 29.6*    284     Recent Labs     10/17/21  0053 10/16/21  0555    141   K 3.8 4.2   * 114*   CO2 24 23   BUN 20 18   CREA 1.19* 1.02   * 112*   CA 8.4* 8.3*     Recent Labs     10/17/21  0053   ALT 16      TBILI 0.2   TP 5.6*   ALB 2.6*   GLOB 3.0     No results for input(s): INR, PTP, APTT, INREXT, INREXT in the last 72 hours. No results for input(s): FE, TIBC, PSAT, FERR in the last 72 hours. No results found for: FOL, RBCF   No results for input(s): PH, PCO2, PO2 in the last 72 hours. No results for input(s): CPK, CKNDX, TROIQ in the last 72 hours.     No lab exists for component: CPKMB  Lab Results   Component Value Date/Time    Cholesterol, total 180 10/15/2021 03:15 AM    HDL Cholesterol 85 10/15/2021 03:15 AM    LDL, calculated 77.2 10/15/2021 03:15 AM    Triglyceride 89 10/15/2021 03:15 AM    CHOL/HDL Ratio 2.1 10/15/2021 03:15 AM     No results found for: GLUCPOC  No results found for: COLOR, APPRN, SPGRU, REFSG, HECTOR, PROTU, GLUCU, KETU, BILU, UROU, JAMES, LEUKU, GLUKE, EPSU, BACTU, WBCU, RBCU, CASTS, UCRY  Medications Reviewed:     Current Facility-Administered Medications   Medication Dose Route Frequency    midodrine (PROAMATINE) tablet 5 mg  5 mg Oral BID WITH MEALS    metoclopramide HCl (REGLAN) tablet 10 mg  10 mg Oral Q6H PRN    pantoprazole (PROTONIX) tablet 40 mg  40 mg Oral ACB    buPROPion SR (WELLBUTRIN SR) tablet 150 mg  150 mg Oral BID    DULoxetine (CYMBALTA) capsule 20 mg  20 mg Oral DAILY    ondansetron (ZOFRAN) injection 4 mg  4 mg IntraVENous Q6H PRN    butalbital-acetaminophen-caffeine (FIORICET, ESGIC) -40 mg per tablet 1 Tablet  1 Tablet Oral Q6H PRN    acetaminophen (TYLENOL) tablet 650 mg  650 mg Oral Q6H PRN    ticagrelor (BRILINTA) tablet 45 mg  45 mg Oral Q12H    ezetimibe (ZETIA) tablet 10 mg  10 mg Oral DAILY    magnesium hydroxide (MILK OF MAGNESIA) 400 mg/5 mL oral suspension 30 mL  30 mL Oral DAILY PRN    aspirin chewable tablet 81 mg  81 mg Oral DAILY   ______________________________________________________________________  EXPECTED LENGTH OF STAY: - - -  ACTUAL LENGTH OF STAY:          4               Jesusita Tavares MD

## 2021-10-18 NOTE — PROGRESS NOTES
Problem: Mobility Impaired (Adult and Pediatric)  Goal: *Acute Goals and Plan of Care (Insert Text)  Outcome: Progressing Towards Goal   PHYSICAL THERAPY TREATMENT  Patient: Ciarra Rosario (34 y.o. female)  Date: 10/18/2021  Diagnosis: [facility]rebral vascular accident) [facility] [I63.9] <principal problem not specified>       Precautions:    Chart, physical therapy assessment, plan of care and goals were reviewed. ASSESSMENT  Patient continues with skilled PT services and is not progressing towards goals. She continues to report dizziness and demonstrates soft BP. Patient declines attempts at gait and stair training due to dizziness and fatigue. She is provided Min A to stand pivot transfer from bedside chair to bed with 1 HHA. Patient demonstrates fair balance during transfer. Patient reports she will be staying with her daughter on the first floor at discharge. Her son is to work from the house until the daughter returns home from her job as an RN. Patient reports she will have 24/7 assistance when she D/C. Current Level of Function Impacting Discharge (mobility/balance): Min A     Other factors to consider for discharge: increased gait and stair training, medical stability, increased risk for falls with hx of falls          PLAN :  Patient continues to benefit from skilled intervention to address the above impairments. Continue treatment per established plan of care. to address goals.     Recommendation for discharge: (in order for the patient to meet his/her long term goals)  IPR (patient has hx of falls prior and was living alone to this admission) v. Physical therapy at least 2 days/week in the home AND ensure assist and/or supervision for safety with gait and transfers     This discharge recommendation:  Has been made in collaboration with the attending provider and/or case management    IF patient discharges home will need the following DME: to be determined (TBD)       SUBJECTIVE:   Patient stated [facility] am I so sleepy .     OBJECTIVE DATA SUMMARY:   Critical Behavior:  Neurologic State: Alert  Orientation Level: Oriented X4  Cognition: Appropriate decision making, Appropriate for age attention/concentration, Appropriate safety awareness  Safety/Judgement: Awareness of environment  Functional Mobility Training:  Bed Mobility:     Supine to Sit: Contact guard assistance  Sit to Supine: Stand-by assistance  Scooting: Stand-by assistance        Transfers:  Sit to Stand: Contact guard assistance  Stand to Sit: Contact guard assistance        Bed to Chair: Moderate assistance                    Balance:  Sitting: Intact; With support  Standing: Impaired; With support  Standing - Static: Fair;Constant support  Standing - Dynamic : Fair;Constant support  Ambulation/Gait Training:                                                        Stairs: Therapeutic Exercises:     Pain Rating:      Activity Tolerance:   Fair    After treatment patient left in no apparent distress:   Supine in bed, Call bell within reach, and Side rails x 3    COMMUNICATION/COLLABORATION:   The patients plan of care was discussed with: Registered nurse.      Cyndi Bustamante, PT   Time Calculation: 13 mins

## 2021-10-18 NOTE — PROGRESS NOTES
Problem: Self Care Deficits Care Plan (Adult)  Goal: *Acute Goals and Plan of Care (Insert Text)  Description:   FUNCTIONAL STATUS PRIOR TO ADMISSION: Patient was modified independent using a single point cane for functional mobility. However, noted in H&P/reported by patient, had an extensive fall history prior to admission, must climb stairs to access her bedroom/bathroom. HOME SUPPORT: The patient lived alone with children who live locally to provide assistance. Patient reporting option to return to daughter's home with first floor set-up if needed. Occupational Therapy Goals  Initiated 10/18/2021  1. Patient will perform bathing with supervision/set-up within 7 day(s). 2.  Patient will perform upper body dressing with supervision/set-up within 7 day(s). 3.  Patient will perform lower body dressing with supervision/set-up within 7 day(s). 4.  Patient will perform toilet transfers with independence within 7 day(s). 5.  Patient will perform all aspects of toileting with independence within 7 day(s). 6.  Patient will perform grooming tasks standing at sink with supervision within 7 day(s). 7.  Patient will utilize energy conservation techniques during functional activities with verbal cues within 7 day(s). Outcome: Not Met    OCCUPATIONAL THERAPY EVALUATION  Patient: Cathy Lopez (83 y.o. female)  Date: 10/18/2021  Primary Diagnosis: CVA (cerebral vascular accident) [facility]) [I63.9]       Precautions: fall       ASSESSMENT  Based on the objective data described below, the patient presents below her functional baseline of modified independent, being primarily limited at this time by decreased balance, decreased activity tolerance due to fatigue/self-reported dizziness, and generalized weakness. She presented today alert and agreeable to participate in tx session.  Pt performed sit>stand with CGA, however once ambulating within room pt required up to [address] for steadying/safety and using a RW/[facility]ait [facility] assessment performed, BE FAST education provided, and details noted below. Of note, patient with RUE coordination delay and reported decreased sensation in RUE fingertips. Pt would benefit from d/c to IPR to maximize functional independence in ADL/IADL tasks. If IPR not an option, recommend HHOT with family assist at home for all OOB activity and ADL/IADL tasks to ensure stability and safety. Current Level of Function Impacting Discharge (ADLs/self-care): Min A for in room mobility, infer set-up to Min A for all basic ADLs    Other factors to consider for discharge: Extensive fall history, PLOF mod I with cane     Patient will benefit from skilled therapy intervention to address the above noted impairments. PLAN :  Recommendations and Planned Interventions: self care training, functional mobility training, therapeutic exercise, balance training, therapeutic activities, endurance activities, patient education and home safety training    Frequency/Duration: Patient will be followed by occupational therapy 5 times a week to address goals. Recommendation for discharge: (in order for the patient to meet his/her long term goals)  Therapy 3 hours per day 5-7 days per week. Pt would benefit from d/c to IPR to maximize functional independence in ADL/IADL tasks. If IPR not an option, recommend HHOT with family assist at home for all OOB activity, ADL/IADL tasks to ensure stability and safety. This discharge recommendation:  Has not yet been discussed the attending provider and/or case management       SUBJECTIVE:   Patient stated I'm feeling really dizzy.     OBJECTIVE DATA SUMMARY:   HISTORY:   Past Medical History:   Diagnosis Date    GERD (gastroesophageal reflux disease)     Hiatal hernia     Hypertension     Nausea & vomiting     Unspecified adverse effect of anesthesia 9-2009    prolonged sedation     Past Surgical History:   Procedure Laterality Date    HX APPENDECTOMY  child, open    HX CHOLECYSTECTOMY  2012    HX ORTHOPAEDIC  2009    lumbar gonzalez.       Expanded or extensive additional review of patient history:     Home Situation  Home Environment: Private residence  # Steps to Enter: 0  One/Two Story Residence: Two story  # of Interior Steps: 12  Living Alone: Yes  Support Systems: Child(mayra)  Patient Expects to be Discharged toF Cor[de-identified]ration (agreeable to d/c to children's house with 1st floor setup)  Current DME Used/Available at Home: Johanna Ferries, straight, Shower chair, Walker, rolling, Walker, rollator  Tub or Shower Type: Shower    Hand dominance: Right    EXAMINATION OF PERFORMANCE DEFICITS:  Cognitive/Behavioral Status:  Neurologic State: Alert  Orientation Level: Disoriented X4  Cognition: Appropriate decision making; Follows commands  Perception: Appears intact  Perseveration: No perseveration noted  Safety/Judgement: Awareness of environment; Insight into deficits    Skin: visually intact     Edema: None noted    Hearing:   WFL    Vision/Perceptual:    Tracking: Able to track stimulus in all quadrants w/o difficulty    Saccades: Within Defined Limits         Visual Fields:  (Able to detect stimulus in all quadrants)  Diplopia: No    Acuity: Within Defined Limits    Corrective Lenses: Reading glasses    Range of Motion:    AROM: Generally decreased, functional  PROM: Generally decreased, functional                      Strength:    Strength: Generally decreased, functional                Coordination:  Coordination: Generally decreased, functional  Fine Motor Skills-Upper: Left Intact; Right Intact    Gross Motor Skills-Upper: Left Intact; Right Intact    Tone & Sensation:    Tone: Normal  Sensation: Impaired (Patient reports numbness in RUE fingertips)                      Balance:  Sitting: Intact; With support  Standing: Impaired; With support  Standing - Static: Fair;Constant support  Standing - Dynamic : Fair;Constant support    Functional Mobility and Transfers for ADLs:  Bed Mobility:  Supine to Sit: Contact guard assistance  Scooting: Contact guard assistance    Transfers:  Sit to Stand: Contact guard assistance  Stand to Sit: Contact guard assistance  Bathroom Mobility: Minimum assistance  Toilet Transfer : Minimum assistance (inferred)  Assistive Device : Gait Belt;Walker, rolling    ADL Assessment:  Feeding: Setup (inferred)    Oral Facial Hygiene/Grooming: Contact guard assistance (standing at sink)    Bathing: Minimum assistance (inferred, for transfer, steadying/safety)    Upper Body Dressing: Contact guard assistance (inferred)    Lower Body Dressing: Minimum assistance (inferred)    Toileting: Minimum assistance (inferred, for transfer, steadying/safety)                ADL Intervention and task modifications:       Grooming  Grooming Assistance: Minimum assistance;Contact guard assistance  Position Performed: Standing (at sink)  Washing Face: Contact guard assistance  Washing Hands: Contact guard assistance  Brushing Teeth: Minimum assistance;Proximal stability                             Cognitive Retraining  Following Commands: Follows two step commands/directions  Safety/Judgement: Awareness of environment; Insight into deficits  Cues: Verbal cues provided    Functional Measure:    Fugl-Patel Assessment of Motor Recovery after Stroke:     Reflex Activity  Flexors/Biceps/Fingers: Can be elicited  Extensors/Triceps: Can be elicited  Reflex Subtotal: 4    Volitional Movement Within Synergies  Shoulder Retraction: Partial  Shoulder Elevation: Partial  Shoulder Abduction (90 degrees): Full  Shoulder External Rotation: Full  Elbow Flexion: Full  Forearm Supination: Partial  Shoulder Adduction/Internal Rotation: Partial  Elbow Extension: Full  Forearm Pronation: Partial  Subtotal: 13    Volitional Movement Mixing Synergies  Hand to Lumbar Spine: Full  Shoulder Flexion (0-90 degrees): Full  Pronation-Supination: Partial  Subtotal: 5    Volitional Movement With Little or No Synergy  Shoulder Abduction (0-90 degrees): Full  Shoulder Flexion ( degrees): Partial  Pronation/Supination: Partial  Subtotal : 4    Normal Reflex Activity  Biceps, Triceps, Finger Flexors: Full  Subtotal : 2    Upper Extremity Total   Upper Extremity Total: 28    Wrist  Stability at 15 Degree Dorsiflexion: Full  Repeated Dorsiflexion/ Volar Flexion: Full  Stability at 15 Degree Dorsiflexion: Full  Repeated Dorsiflexion/ Volar Flexion: Full  Circumduction: Full  Wrist Total: 10    Hand  Mass Flexion: Full  Mass Extension: Full  Grasp A: Full  Grasp B: Full  Grasp C: Full  Grasp D: Full  Grasp E: Full  Hand Total: 14    Coordination/Speed  Tremor: None  Dysmetria: None  Time: 2-5s  Coordination/Speed Total : 5    Total A-D  Total A-D (Motor Function): 57/66         This is a reliable/valid measure of arm function after a neurological event. It has established value to characterize functional status and for measuring spontaneous and therapy-induced recovery; tests proximal and distal motor functions. Fugl-Patel Assessment - UE scores recorded between five and 30 days post neurologic event can be used to predict UE recovery at six months post neurologic event. Severe = 0-21 points   Moderately Severe = 22-33 points   Moderate = 34-47 points   Mild = 48-66 points  ADRYAN Andersen, COLE Robison, & CONNOR Bianchi (1992). Measurement of motor recovery after stroke: Outcome assessment and sample size requirements.  Stroke, 23, pp. 5944-5158.   --------------------------------------------------------------------------------------------------------------------------------------------------------------------  MCID:  Stroke:   Huyen Joey et al, 2001; n = 171; mean age 79 (6) years; assessed within 16 (12) days of stroke, Acute Stroke)  FMA Motor Scores from Admission to Discharge   10 point increase in FMA Upper Extremity = 1.5 change in discharge FIM   10 point increase in FMA Lower Extremity = 1.9 change in discharge FIM  MDC:   Stroke:   Dustin Ronal et al, 2008, n = 14, mean age = 59.9 (14.6) years, assessed on average 14 (6.5) months post stroke, Chronic Stroke)   FMA = 5.2 points for the Upper Extremity portion of the assessment     Based on the above components, the patient evaluation is determined to be of the following complexity level: LOW   Pain Rating:  None noted this session    Activity Tolerance:   Fair, requires rest breaks and patient noting dizziness throughout session, with increased severity with activity. BP monitored and stable throughout session. After treatment patient left in no apparent distress:    Sitting in chair, Call bell within reach and Bed / chair alarm activated      COMMUNICATION/EDUCATION:   The patients plan of care was discussed with: Registered nurse. Patient/family agree to work toward stated goals and plan of care. Patient and/or family was verbally educated on the BE FAST acronym for signs/symptoms of CVA and TIA. BE FAST was written on patient's communication board  for visual education and reinforcement. All questions answered with patient indicating good understanding. This patients plan of care is appropriate for delegation to [facility].     Thank you for this referral.  Fletcher Oscar, ALFRED  Time Calculation: 34 mins

## 2021-10-18 NOTE — PROGRESS NOTES
Dizziness improved. Neuro stable  Orthostatic hypotension after carotid stent.     Fluids, midodrine, time  Consider florinef

## 2021-10-18 NOTE — PROGRESS NOTES
Transition of Care Plan: Likely IPR-per PT/OT recs     RUR: 11%     PCP F/U: Dr. Maryjane Mark     Disposition: TBD-Likely IPR     Transportation: TBD-BLS vs Family     Main Contact: DANE Glass-[phone]: Spoke with daughter about preference for IPR vs HH. States that she feels patient would get more therapy at an IPR than she would at home and would like for her to go to an IPR. States she thinks she wants to choose [facility] based on their previous stay. However, would like to talk to her mom about it tonight. This CM to leave a list of facilities in the room for daughter to choose from. Will continue to follow.      Julián Rosario RN, CMR

## 2021-10-18 NOTE — PROGRESS NOTES
Problem: Communication Impaired (Adult)  Goal: *Acute Goals and Plan of Care (Insert Text)  Description: Speech Therapy Goals  Initiated 10/18/2021    1. Patient will participate in semantic feature analysis with 90% accuracy within 7 days. Outcome: Progressing Towards Goal     SPEECH LANGUAGE PATHOLOGY BEDSIDE SWALLOW AND SPEECH EVALUATIONS  Patient: Tammie Tanner (54 y.o. female)  Date: 10/18/2021  Primary Diagnosis: CVA (cerebral vascular accident) [facility]) [I63.9]        Precautions:        ASSESSMENT :  Based on the objective data described below, the patient presents with functional oropharyngeal phases of swallow without any overt s/s of aspiration nor overt difficulty appreciated. Pt reports globus sensation, which is consistent with her history of GERD and hiatal hernia, and could certainly be exacerbated by hospitalization. Therefore, recommend continued medical management of reflux. No further swallowing needs. Additionally, pt presents with a mild expressive aphasia most consistent with an anomic aphasia at this time. Pt noted to have anomia at the conversation level. Will benefit from SLP at this and the next level of care to address high level deficits. Patient will benefit from skilled intervention to address the above impairments. Patients rehabilitation potential is considered to be Good     PLAN :  Recommendations and Planned Interventions:  --regular diet/thin liquids  --good oral care  --esophageal precautions    Frequency/Duration: Patient will be followed by speech-language pathology 2 times a week to address goals. Discharge Recommendations: To Be Determined     SUBJECTIVE:   Patient stated, \"I think that sounds good.     OBJECTIVE:     Past Medical History:   Diagnosis Date    GERD (gastroesophageal reflux disease)     Hiatal hernia     Hypertension     Nausea & vomiting     Unspecified adverse effect of anesthesia 9-2009    prolonged sedation     Past Surgical History: Procedure Laterality Date    HX APPENDECTOMY  child, open    HX CHOLECYSTECTOMY  2012    HX ORTHOPAEDIC  2009    lumbar gonzalez.     Prior Level of Function/Home Situation:   Home Situation  Home Environment: Private residence  # Steps to Enter: 0  One/Two Story Residence: Two story  # of Interior Steps: 12  Living Alone: Yes  Support Systems: Child(mayra)  Patient Expects to be Discharged toF Cor[de-identified]ration (agreeable to d/c to children's house with 1st floor setup)  Current DME Used/Available at Home: Zamudio Germania, straight, Shower chair, Walker, rolling, Walker, rollator  Tub or Shower Type: Shower  Diet prior to admission: regular diet/thin liquids  Current Diet:  regular diet/thin liquids   Cognitive and Communication Status:  Neurologic State: Alert  Orientation Level: Oriented X4  Cognition: Appropriate decision making, Appropriate for age attention/concentration, Appropriate safety awareness  Perception: Appears intact  Perseveration: No perseveration noted  Safety/Judgement: Awareness of environment  Swallowing Evaluation:   Oral Assessment:  Oral Assessment  Labial: No impairment  Dentition: Natural  Oral Hygiene: oral mucosa moist and clear of secretion  Lingual: No impairment  Velum: No impairment  Mandible: No impairment  P.O. Trials:  Patient Position: upright in bed  Vocal quality prior to P.O.: No impairment  Consistency Presented: Thin liquid;Puree; Solid  How Presented: Self-fed/presented;Cup/sip     Bolus Acceptance: No impairment  Bolus Formation/Control: No impairment     Propulsion: No impairment  Oral Residue: None  Initiation of Swallow: No impairment  Laryngeal Elevation: Functional  Aspiration Signs/Symptoms: None  Pharyngeal Phase Characteristics: No impairment, issues, or problems   Effective Modifications: None  Cues for Modifications: None       Oral Phase Severity: No impairment  Pharyngeal Phase Severity : No impairment    NOMS:   The NOMS functional outcome measure was used to quantify this patient's level of swallowing impairment. Based on the NOMS, the patient was determined to be at level 7 for swallow function       NOMS Swallowing Levels:  Level 1 (CN): NPO  Level 2 (CM): NPO but takes consistency in therapy  Level 3 (CL): Takes less than 50% of nutrition p.o. and continues with nonoral feedings; and/or safe with mod cues; and/or max diet restriction  Level 4 (CK): Safe swallow but needs mod cues; and/or mod diet restriction; and/or still requires some nonoral feeding/supplements  Level 5 (CJ): Safe swallow with min diet restriction; and/or needs min cues  Level 6 (CI): Independent with p.o.; rare cues; usually self cues; may need to avoid some foods or needs extra time  Level 7 ([address] [place] [address]): Independent for all p.o.  DERRELL. (2003). National Outcomes Measurement System (NOMS): Adult Speech-Language Pathology User's Guide. Speech/Language Evaluation  Motor Speech:  Oral-Motor Structure/Motor Speech  Labial: No impairment  Dentition: Natural  Oral Hygiene: oral mucosa moist and clear of secretion  Lingual: No impairment  Velum: No impairment  Mandible: No impairment  Language Comprehension and Expression:  Auditory Comprehension  Auditory Impairment: No   Verbal Expression  Primary Mode of Expression: Verbal  Initiation: No impairment  Automatic Speech Task: No impairment  Repetition: No impairment  Naming: No impairment  Sentence Completion: No impairment  Conversation: Fluent  Speech Characteristics: Word retrieval  Overall Impairment: Mild      NOMS:   The NOMS functional outcome measure was used to quantify this patient's level of expressive language impairment. Based on the NOMS, the patient was determined to be at level 5 for spoken language expression. NOMS Spoken Language Expression:  Level 1 (CN): Verbalizations not meaningful to anyone. Level 2 (CM): Few attempts accurate/appropriate. Max cues to elicit automatic/imitative words/phrases.   Level 3 (CL): Communication partner responsible for communication; Mod cues for words/phrases meaningful in context  Level 4 (CK): Initiate during simple, routine activities w/familiar partner. Mod cues to produce simple sentences  Level 5 (Zaheer Ralph): Initiates communication with familiar and unfamiliar partners. Min cues for complex sentences. Level 6 (CI): Communicates for most activities. Some limitations still present for vocational/social activities. Rarely cued for complex info  Level 7 [facility]): Independent communication. DERRELL. (2003). National Outcomes Measurement System (NOMS): Adult Speech-Language Pathology User's Guide. Pain:  Pain Scale 1: Numeric (0 - 10)  Pain Intensity 1: 5  Pain Location 1: Head    After treatment:   Call bell within reach and Nursing notified    COMMUNICATION/EDUCATION:   Patient was educated regarding her deficit(s) of aphasia as this relates to her diagnosis of left neuro onset. She demonstrated Good understanding as evidenced by teach back. The patient's plan of care including recommendations, planned interventions, and recommended diet changes were discussed with: Registered nurse. Patient/family have participated as able in goal setting and plan of care.     Thank you for this referral.  Lucho Dupree, SLP  Time Calculation: 16 mins

## 2021-10-18 NOTE — PROGRESS NOTES
SLP Contact Note    SLP evaluation complete. Suspect swallow difficulty related to pt's hx of reflux, with pt endorsing globus at the level of the thyroid down to the space between clavicles. Recommend regular diet/thin liquids with esophageal precautions. High level aphasia 2/2 anomia at the conversation level. Full note to follow.       Thank you,  LG GonzalezEd, [id number] [facility], [place]  Speech-Language Pathologist

## 2021-10-18 NOTE — PROGRESS NOTES
Neurointerventional Surgery Progress Note  Lorna Romero, AGACNP-BC  Neurocritical Care NP      Admit Date: 10/14/2021   LOS: 4 days        Daily Progress Note: 10/18/2021    S/P: POD 3 diagnostic cerebral angiogram and left sided carotid artery stenting    HPI: Angélica Demarco is a [de-identified] y.o. female with a past medical history of GERD, HLD, hypertension and depression who presented to the emergency department via EMS on 10/14/2021 with slurred speech and right-sided weakness. Per review of notes, she is unsure of time of onset and thinks she may have woken up this way however, she did not notice slurred speech until speaking with her daughter on the phone that morning around 0715. She was deemed not a candidate for TPA by teleneurology due to unknown time of onset. The patient reports that she takes a baby aspirin every day. Per review of notes, the patient also reported  a recent appointment with an outpatient neurologist [facility] for evaluation of right leg weakness which she states has been getting progressively worse over the past several years and causing her to have multiple falls. Initial CT of the head showed no acute process. CT of the head and neck showed a severe 80 to 90% stenosis of the left proximal internal carotid artery. Out to moderate stenosis origin of the left common carotid artery. Approximately 20% stenosis of the proximal right ICA. No definitive intraluminal filling defect or other CTA findings of large vessel intracranial occlusion. CT perfusion showed a questionable minimal diminished flow/perfusion of the left posterior temporal lobe which may be artifactual.  Otherwise no flow/perfusion abnormality demonstrated. MRI of the brain showed restricted diffusion in the left posterior superior temporal/parietal cortex suggesting a degree of acute cortical infarction. Questionable punctate focus versus artifact in the left parietal/occipital junction.  Additionally, questionable artifact versus minimal restricted diffusion left anterior frontal subcortical region. 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 Neurointerventional Surgery is following due to the left carotid artery stenosis. The patient underwent a diagnostic cerebral angiogram and left sided carotid artery stenting on 10/15/2021 with Dr. Jackie Hines. Subjective:   Patient developed dizziness with associated nausea yesterday. She was started on midodrine due to borderline low normal BP s/p carotid stenting. CT of the head was repeated last night which was stable. Reglan was added to her regimen. She reports some improvement with her symptoms today. She still endorses some dizziness with movement, especially when getting up. Symptoms are associated with nausea. She denies any vomiting. She reports that she has a 3 out of 10 frontal headache described as dull. Her current pain regimen is helping her symptoms. She reports some cloudiness with her vision, however she reports she can still see ok. She still endorses right hand weakness, incoordination with the right hand, and numbness/tingling in fingers of right hand. She denies any speech difficulty, chest pain, or shortness of breath. BP is running in the low 100s this morning. MAP is in the 60s. She reports that she has not had much of an appetite and is not drinking fluids.   Current Facility-Administered Medications   Medication Dose Route Frequency Provider Last Rate Last Admin    influenza vaccine 2021-22 (6 mos+)(PF) (FLUARIX/FLULAVAL/FLUZONE QUAD) injection 0.5 mL  1 Each IntraMUSCular PRIOR TO DISCHARGE Girma Whitlock MD        meclizine (ANTIVERT) tablet 12.5 mg  12.5 mg Oral Q6H PRN Luís Carranza Session, NP        lactated Ringers infusion  75 mL/hr IntraVENous CONTINUOUS Luís Carranza Session, NP 75 mL/hr at 10/18/21 1442 75 mL/hr at 10/18/21 1442    midodrine (PROAMATINE) tablet 10 mg  10 mg Oral TID WITH MEALS Girma Whitlock MD        metoclopramide HCl (REGLAN) tablet 10 mg  10 mg Oral Q6H PRN Lindsey Osborn NP        pantoprazole (PROTONIX) tablet 40 mg  40 mg Oral ACB Cirilo GIL NP-C   40 mg at 10/18/21 0644    buPROPion SR (WELLBUTRIN SR) tablet 150 mg  150 mg Oral BID Brendalyn Sammy, NP   150 mg at 10/18/21 5334    DULoxetine (CYMBALTA) capsule 20 mg  20 mg Oral DAILY Cirilo GIL NP-C   20 mg at 10/18/21 0949    ondansetron (ZOFRAN) injection 4 mg  4 mg IntraVENous Q6H PRN Cirilo GIL NP-C   4 mg at 10/18/21 6509    butalbital-acetaminophen-caffeine (FIORICET, ESGIC) -40 mg per tablet 1 Tablet  1 Tablet Oral Q6H PRN Lore Carter MD   1 Tablet at 10/18/21 1002    acetaminophen (TYLENOL) tablet 650 mg  650 mg Oral Q6H PRN Marshall Carranza NP        ticagrelor (BRILINTA) tablet 45 mg  45 mg Oral Q12H Skyler Alcaraz NP   45 mg at 10/18/21 1133    ezetimibe (ZETIA) tablet 10 mg  10 mg Oral DAILY Michellndalyn Sammy, NP   10 mg at 10/18/21 3726    magnesium hydroxide (MILK OF MAGNESIA) 400 mg/5 mL oral suspension 30 mL  30 mL Oral DAILY PRN Shiv Jacinto NP        aspirin chewable tablet 81 mg  81 mg Oral DAILY Skyler Hiller, NP   81 mg at 10/18/21 0950        No Known Allergies    Review of Systems:  Pertinent items are noted in the History of Present Illness. Objective:     Vital signs  Temp (24hrs), Av °F (36.7 °C), Min:97.6 °F (36.4 °C), Max:98.2 °F (36.8 °C)   10/18 07 - 10/18 1900  In: 240 [P.O.:240]  Out: -   No intake/output data recorded.     Visit Vitals  BP (!) 119/46   Pulse (!) 59   Temp 97.6 °F (36.4 °C)   Resp 17   Ht 5' 4\" (1.626 m)   Wt 167 lb 12.3 oz (76.1 kg)   SpO2 95%   BMI 28.80 kg/m²      O2 Device: None (Room air)     Pain control  Pain Assessment  Pain Scale 1: Numeric (0 - 10)  Pain Intensity 1: 0  Pain Location 1: Head  Pain Description 1: Dull  Pain Intervention(s) 1: Medication (see MAR)    PT/OT  Gait     Gait  Base of Support: Narrowed  Speed/Anusha: Shuffled, Pace decreased (<100 feet/min), Slow  Step Length: Left shortened, Right shortened  Gait Abnormalities: Decreased step clearance, Trunk sway increased, Shuffling gait  Ambulation - Level of Assistance: Supervision  Distance (ft): 15 Feet (ft)  Assistive Device: Gait belt, Walker, rolling  Interventions: Safety awareness training, Verbal cues   Interventions: Safety awareness training, Verbal cues         Vitals:    10/18/21 1005 10/18/21 1012 10/18/21 1212 10/18/21 1409   BP: (!) 101/43   (!) 119/46   Pulse: (!) 57 61 (!) 54 (!) 59   Resp: 16   17   Temp: 97.6 °F (36.4 °C)   97.6 °F (36.4 °C)   SpO2: 96%   95%   Weight:       Height:            Physical Exam:  GENERAL: alert, cooperative, no distress  LUNG: clear to auscultation bilaterally  HEART: regular rate and rhythm, sinus bradycardia on the monitor, S1, S2 normal, no murmur, click, rub or gallop  EXTREMITIES:  extremities normal, atraumatic, no cyanosis or edema, distal pulses +1 bilaterally. SKIN: Skin cool to touch. Appropriate for ethnicity. Right groin site, nontender to palpation, clean, dry, and intact. No hematoma or bleeding noted. Bruising noted around site. Neurologic Exam:  Mental Status:  Alert and oriented x 4. Appropriate affect, mood and behavior. Language:    Normal fluency, repetition, comprehension and naming. Cranial Nerves:        Pupils equal, round and reactive to light. Visual fields full to confrontation. Extraocular movements intact. Facial sensation intact. No facial asymmetry. No dysarthria. Tongue protrudes to midline, palate elevates symmetrically. Motor:    Strength in RUE 4+/5, no drift present in arms. Right leg drift present. RLE 4/5 with leg extended and some weakness noted with dorsiflexion on right foot, otherwise 5/5 strength. LUE and LLE moves purposefully with 5/5 strength. Bulk and tone normal.      No involuntary movements.     Sensation:     Decreased sensation in the right arm to light touch, otherwise sensation intact. No neglect. Coordination & Gait: No ataxia with finger-to-nose and heel-to-shin bilaterally. Gait deferred. 24 hour results:    Recent Results (from the past 24 hour(s))   METABOLIC PANEL, BASIC    Collection Time: 10/18/21  3:05 PM   Result Value Ref Range    Sodium 139 136 - 145 mmol/L    Potassium 4.0 3.5 - 5.1 mmol/L    Chloride 111 (H) 97 - 108 mmol/L    CO2 22 21 - 32 mmol/L    Anion gap 6 5 - 15 mmol/L    Glucose 87 65 - 100 mg/dL    BUN 17 6 - 20 MG/DL    Creatinine 1.12 (H) 0.55 - 1.02 MG/DL    BUN/Creatinine ratio 15 12 - 20      GFR est AA 57 (L) >60 ml/min/1.73m2    GFR est non-AA 47 (L) >60 ml/min/1.73m2    Calcium 8.7 8.5 - 10.1 MG/DL          Imaging:  CT of head on 10/14/2021 at 0821 shows  IMPRESSION  No acute process or change compared to the prior exam.    CTA of head and neck on 10/14/2021 at 0905 shows     IMPRESSION  1. Severe 80-90% stenosis left proximal internal carotid artery. See above. 2. Mild/moderate stenosis origin left common carotid artery. 3. Approximately 20% stenosis proximal right internal carotid artery. 4. No definitive intraluminal filling defect or other CTA findings of large  vessel intracranial occlusion. 5. Asymmetric decreased size left MCA may be related to flow limitation from the  internal carotid artery stenosis. CT perfusion on 10/14/2021 at 0904 shows  IMPRESSION  Possible artifact left temporal lobe. Correlate clinically. Otherwise no  flow/perfusion abnormality demonstrated. MRI of brain on 10/14/2021 at 1153 shows  IMPRESSION  1. Findings consistent with a degree of acute ischemic injury left parietal  lobe. See above descriptions. 2. Left sella/pituitary cystic mass. Incompletely evaluated. Depending on  clinical circumstance consider follow-up MRI with contrast and with attention to  the pituitary. CT of head on 10/16/2021 at 1449 shows  IMPRESSION  1.   Left parietal lobe infarct seen on prior MRI are not conspicuous on current  CT. 2.  Stable cystic lesion in the left aspect pituitary/sella. Again follow-up  with contrast enhanced pituitary protocol MRI can be obtained as an outpatient  nonemergently for further characterization       CT Results  (Last 48 hours)               10/17/21 2026  CT HEAD WO CONT Final result    Impression:  1. No acute intracranial abnormality. Acute left parietal lobe infarcts seen on   prior MRI is not well demonstrated on CT. 2. Stable cystic lesion in the left aspect of the pituitary/sella. Narrative:  EXAM:  CT head without contrast       INDICATION: Persistent dizziness and nausea. COMPARISON: CT 10/16/2021. MRI 10/14/2021. TECHNIQUE: Axial noncontrast head CT from foramen magnum to vertex. Coronal and   sagittal reformatted images were obtained. CT dose reduction was achieved   through use of a standardized protocol tailored for this examination and   automatic exposure control for dose modulation. Adaptive statistical iterative   reconstruction (ASIR) was utilized. FINDINGS:  There is diffuse age-related parenchymal volume loss. The ventricles   and sulci are age-appropriate without hydrocephalus. There is no mass effect or   midline shift. There is no intracranial hemorrhage or extra-axial fluid   collection. Acute left parietal lobe infarcts seen on prior MRI are not well   demonstrated on CT. There is stable mild chronic microvascular ischemic disease   in the periventricular white matter. There is no new abnormal parenchymal   attenuation. The basal cisterns are patent. A cystic lesion in the left aspect   of the pituitary/sella is again noted. The osseous structures are intact. There is a trace fluid level in the right   maxillary sinus. The remaining paranasal sinuses and mastoid air cells are   clear.                Assessment:     Active Problems:    CVA (cerebral vascular accident) ([address] (10/14/2021)      Orthostatic hypotension (10/18/2021)        Plan:    Left ischemic infarct in the setting of severe L ICA Stenosis   - s/p cerebral angiogram and left carotid artery stenting on 10/15/2021  - lipid panel shows LDL 77.2, goal <70 continue Zetia 10 mg daily, can resume Repatha outpatient as patient was on this previously per Neurology (patient reports she has had nausea with statins)  - continue Aspirin 81 mg daily and Brilinta 45 mg BID (patient therapeutic on aspirin and brilinta)  - Hgb A1C ok at 5.6  - SBP goal <140 s/p carotid stenting  - repeat Head CT on 10/17 shows no acute intracranial abnormality. Acute left parietal lobe infarct seen on prior MRI is not well demonstrated on CT. Stable cystic lesion in the left aspect of the pituitary/sella.   - PT/OT/SLP evals (PT/OT recommending inpatient rehab vs home therapy)  - neuro checks every 4 hours  - Neurology following as needed  - NIS following    Intractable Headache (improved)  - Continue Tylenol, Fioricet, Reglan PRN    Left Sella/Pituitary Cystic Mass  - as seen on imaging  - can follow up with Neurosurgery outpatient for further discussion and work-up (discussed with Dr. Abby Pisano (neurosurgery on call)    Orthostatic hypotension s/p carotid stenting   - increase midodrine to 10 mg TID   - started maintenance IV fluids LR at 75 ml/hr for hydration, encouraged PO intake and fluids for hydration/nutritition (Hospitalist consulted nutrition services)    Dizziness   - start Meclizine 12.5 mg PO every 6 hours as needed, can increase if needed      Activity: Up with assistance   DVT ppx: SCDs   Dispo: TBD    Plan d/w Dr. Sharon Wagner, Dr. Hue Esquivel (Hospitalist) RN, and patient.        Vicenta Ragsdale, MAY

## 2021-10-18 NOTE — PROGRESS NOTES
Bedside and Verbal shift change report given to CRISTAL Pichardo (oncoming nurse) by Alana Toussaint (offgoing nurse). Report included the following information SBAR, Kardex, Cardiac Rhythm NSR and Dual Neuro Assessment.

## 2021-10-18 NOTE — PROGRESS NOTES
Neurocritical Care Brief Progress Note:  80-year-old female with history of GERD, HLD, hypertension and depression here with left parietal lobe that appears to be artery-to-artery emboli in the setting of left ICA severe 80-90% stenosis. The patient is POD #2cerebral angiogram and left sided carotid artery stenting on 10/15/21 with Dr. Jose Guzmán. Status post stent carotid duplex shows s/p left carotid angioplasty and stenting; mild, less than 50 percent, mechanical stenosis due to narrowing of the stent. Patient exhibited dizziness associated with nausea with no vomitus this morning with blood pressure of 105/40 which isn't uncommon post stenting due to activation of the baroreflex which reduces sympathetic tone after carotid artery stenting. Repeat CTH at 20:26 is stable. Neuro exam stable. Patient admits improvement with dizziness. Now exhibiting very mild dizziness associated with headache and nausea with positional changes. Continues to have right hand decrease sensation with mild RLE weakness that is unchanged. Patient's blood pressure was 108/48 tonight when she got up to use the bathroom which improved to 116/46 after sitting. Discussed with patient to change position slowly and gain bearance prior to standing. Started patient on Reglan 10mg Q6hr prn for nausea and headache. Per neurology no need for echo; 2018 echo with EF 55-60% with abnormal left ventricular relaxation (grade 1 diastolic dysfunction). Noted abnormal TSH; may need to be worked up for thyroid dz. ..will defer to hospitalist.     R groin with bruises otherwise no pain, no bleed, no hematoma or infection. Continue current plan by BILL.      Sharon Dumont NP  Neurocritical Care Nurse Practitioner  [phone]

## 2021-10-19 ENCOUNTER — APPOINTMENT (OUTPATIENT)
Dept: MRI IMAGING | Age: 81
DRG: 035 | End: 2021-10-19
Attending: NURSE PRACTITIONER
Payer: MEDICARE

## 2021-10-19 LAB
ANION GAP SERPL CALC-SCNC: 5 MMOL/L (ref 5–15)
BUN SERPL-MCNC: 14 MG/DL (ref 6–20)
BUN/CREAT SERPL: 11 (ref 12–20)
CALCIUM SERPL-MCNC: 8.7 MG/DL (ref 8.5–10.1)
CHLORIDE SERPL-SCNC: 111 MMOL/L (ref 97–108)
CO2 SERPL-SCNC: 24 MMOL/L (ref 21–32)
CREAT SERPL-MCNC: 1.22 MG/DL (ref 0.55–1.02)
ERYTHROCYTE [DISTWIDTH] IN BLOOD BY AUTOMATED COUNT: 13.5 % (ref 11.5–14.5)
GLUCOSE SERPL-MCNC: 88 MG/DL (ref 65–100)
HCT VFR BLD AUTO: 31.5 % (ref 35–47)
HGB BLD-MCNC: 10.2 G/DL (ref 11.5–16)
MCH RBC QN AUTO: 29.8 PG (ref 26–34)
MCHC RBC AUTO-ENTMCNC: 32.4 G/DL (ref 30–36.5)
MCV RBC AUTO: 92.1 FL (ref 80–99)
NRBC # BLD: 0 K/UL (ref 0–0.01)
NRBC BLD-RTO: 0 PER 100 WBC
PLATELET # BLD AUTO: 257 K/UL (ref 150–400)
PMV BLD AUTO: 10.9 FL (ref 8.9–12.9)
POTASSIUM SERPL-SCNC: 3.8 MMOL/L (ref 3.5–5.1)
RBC # BLD AUTO: 3.42 M/UL (ref 3.8–5.2)
SARS-COV-2, COV2: NORMAL
SODIUM SERPL-SCNC: 140 MMOL/L (ref 136–145)
WBC # BLD AUTO: 10.2 K/UL (ref 3.6–11)

## 2021-10-19 PROCEDURE — 74011250636 HC RX REV CODE- 250/636: Performed by: NURSE PRACTITIONER

## 2021-10-19 PROCEDURE — 74011250637 HC RX REV CODE- 250/637: Performed by: NURSE PRACTITIONER

## 2021-10-19 PROCEDURE — 99231 SBSQ HOSP IP/OBS SF/LOW 25: CPT | Performed by: RADIOLOGY

## 2021-10-19 PROCEDURE — 65660000000 HC RM CCU STEPDOWN

## 2021-10-19 PROCEDURE — U0005 INFEC AGEN DETEC AMPLI PROBE: HCPCS

## 2021-10-19 PROCEDURE — 84481 FREE ASSAY (FT-3): CPT

## 2021-10-19 PROCEDURE — 97535 SELF CARE MNGMENT TRAINING: CPT

## 2021-10-19 PROCEDURE — 97116 GAIT TRAINING THERAPY: CPT

## 2021-10-19 PROCEDURE — 97530 THERAPEUTIC ACTIVITIES: CPT

## 2021-10-19 PROCEDURE — 85027 COMPLETE CBC AUTOMATED: CPT

## 2021-10-19 PROCEDURE — 70551 MRI BRAIN STEM W/O DYE: CPT

## 2021-10-19 PROCEDURE — 74011250637 HC RX REV CODE- 250/637: Performed by: INTERNAL MEDICINE

## 2021-10-19 PROCEDURE — 80048 BASIC METABOLIC PNL TOTAL CA: CPT

## 2021-10-19 PROCEDURE — 92507 TX SP LANG VOICE COMM INDIV: CPT | Performed by: SPEECH-LANGUAGE PATHOLOGIST

## 2021-10-19 PROCEDURE — 36415 COLL VENOUS BLD VENIPUNCTURE: CPT

## 2021-10-19 RX ADMIN — EZETIMIBE 10 MG: 10 TABLET ORAL at 10:15

## 2021-10-19 RX ADMIN — MIDODRINE HYDROCHLORIDE 10 MG: 5 TABLET ORAL at 12:07

## 2021-10-19 RX ADMIN — TICAGRELOR 45 MG: 90 TABLET ORAL at 21:30

## 2021-10-19 RX ADMIN — SODIUM CHLORIDE, POTASSIUM CHLORIDE, SODIUM LACTATE AND CALCIUM CHLORIDE 75 ML/HR: 600; 310; 30; 20 INJECTION, SOLUTION INTRAVENOUS at 04:09

## 2021-10-19 RX ADMIN — BUPROPION HYDROCHLORIDE 150 MG: 150 TABLET, EXTENDED RELEASE ORAL at 10:15

## 2021-10-19 RX ADMIN — DULOXETINE HYDROCHLORIDE 20 MG: 20 CAPSULE, DELAYED RELEASE ORAL at 10:16

## 2021-10-19 RX ADMIN — BUPROPION HYDROCHLORIDE 150 MG: 150 TABLET, EXTENDED RELEASE ORAL at 17:31

## 2021-10-19 RX ADMIN — PANTOPRAZOLE SODIUM 40 MG: 40 TABLET, DELAYED RELEASE ORAL at 08:33

## 2021-10-19 RX ADMIN — ASPIRIN 81 MG CHEWABLE TABLET 81 MG: 81 TABLET CHEWABLE at 10:15

## 2021-10-19 RX ADMIN — MIDODRINE HYDROCHLORIDE 10 MG: 5 TABLET ORAL at 08:33

## 2021-10-19 RX ADMIN — TICAGRELOR 45 MG: 90 TABLET ORAL at 10:15

## 2021-10-19 NOTE — PROGRESS NOTES
Updated patient's family on plan of care for today. Patient reports she is feeling better. She reports the nausea, headache, and dizziness has improved. Please notify Neurocritical care NP on call for any acute changes.     Tk Tanner [facility]  Neurocritical care NP

## 2021-10-19 NOTE — PROGRESS NOTES
Problem: Self Care Deficits Care Plan (Adult)  Goal: *Acute Goals and Plan of Care (Insert Text)  Description:   FUNCTIONAL STATUS PRIOR TO ADMISSION: Patient was modified independent using a single point cane for functional mobility. However, noted in H&P/reported by patient, had an extensive fall history prior to admission, must climb stairs to access her bedroom/bathroom. HOME SUPPORT: The patient lived alone with children who live locally to provide assistance. Patient reporting option to return to daughter's home with first floor set-up if needed. Occupational Therapy Goals  Initiated 10/18/2021  1. Patient will perform bathing with supervision/set-up within 7 day(s). 2.  Patient will perform upper body dressing with supervision/set-up within 7 day(s). 3.  Patient will perform lower body dressing with supervision/set-up within 7 day(s). 4.  Patient will perform toilet transfers with independence within 7 day(s). 5.  Patient will perform all aspects of toileting with independence within 7 day(s). 6.  Patient will perform grooming tasks standing at sink with supervision within 7 day(s). 7.  Patient will utilize energy conservation techniques during functional activities with verbal cues within 7 day(s). Outcome: Progressing Towards Goal    OCCUPATIONAL THERAPY TREATMENT  Patient: Tracie Dave (89 y.o. female)  Date: 10/19/2021  Diagnosis: [facility]cere[facility] [I63.9] <principal problem not specified>       Precautions:   fall  Chart, occupational therapy assessment, plan of care, and goals were reviewed. ASSESSMENT  Patient continues with skilled OT services and is progressing towards goals. Patient received sitting in beddside chair, resting but alert to voice and agreeable to tx session.  Patient continues to be primarily limited by self reported dizziness, decreased balance, impaired safety awareness (regarding use of RW for safety during mobility and ADLs), and generalized weakness. Orthostatics taken, stable in sitting and standing. Patient requiring up to max verbal cues for use, positioning, and safety of RW during ambulation and ADL activities. Continue to recommend IPR at d/c to increase functional independence and performance of ADL/IADL tasks. If IPR not an option, recommend HHOT with family assist at home for all OOB activity and ADL/IADL tasks to ensure stability and safety. Current Level of Function Impacting Discharge (ADLs): Min A for in room and hallway mobility with gait belt and RW. Infer set up to min A for all ADLs. Other factors to consider for discharge: extensive fall history         PLAN :  Patient continues to benefit from skilled intervention to address the above impairments. Continue treatment per established plan of care to address goals. Recommendation for discharge: (in order for the patient to meet his/her long term goals)  Therapy 3 hours per day 5-7 days per week. If IPR not an option, recommend HHOT with family assist at home for all OOB activity and ADL/IADL tasks to ensure stability and safety. This discharge recommendation:  Has been made in collaboration with the attending provider and/or case management         SUBJECTIVE:   Patient stated I don't understand why I feel so dizzy all the time. \"    OBJECTIVE DATA SUMMARY:   Cognitive/Behavioral Status:  Neurologic State: Alert;Eyes open to stimulus  Orientation Level: Oriented X4  Cognition: Follows commands (decreased safety awareness)             Functional Mobility and Transfers for ADLs:  Bed Mobility:  Scooting: Stand-by assistance    Transfers:  Sit to Stand: Contact guard assistance  Functional Transfers  Bathroom Mobility: Minimum assistance  Toilet Transfer : Minimum assistance       Balance:  Sitting: Intact; With support  Standing: Impaired; With support  Standing - Static: Fair  Standing - Dynamic : Fair;Constant support    ADL Intervention: Grooming  Grooming Assistance: Minimum assistance;Contact guard assistance  Position Performed: Standing (at sink, with RW present for safety/stability)  Washing Face: Contact guard assistance  Washing Hands: Contact guard assistance  Brushing Teeth: Minimum assistance;Proximal stability                        Toileting  Toileting Assistance: Minimum assistance  Bladder Hygiene: Contact guard assistance  Clothing Management: Contact guard assistance  Cues: Verbal cues provided; Tactile cues provided    Cognitive Retraining  Organizing/Sequencing: Two step sequence  Following Commands: Follows two step commands/directions  Cues: Verbal cues provided      Pain:  Patient did not report pain this session. Activity Tolerance:   Fair and requires rest breaks    After treatment patient left in no apparent distress:   Sitting in chair, Call bell within reach and Bed / chair alarm activated    COMMUNICATION/COLLABORATION:   The patients plan of care was discussed with: Physical therapist and Registered nurse. Patient was educated regarding Her deficit(s) of generalized weakness and decreased balance as they relate to Her diagnosis of CVA. She demonstrated Fair understanding as evidenced by verbal discussion of stroke education. Patient and/or family was verbally educated on the BE FAST acronym for signs/symptoms of CVA and TIA. BE FAST was written on patient's communication board  for visual education and reinforcement. All questions answered with patient indicating fair understanding.      Joaquín Jaffe OT  Time Calculation: 25 mins

## 2021-10-19 NOTE — PROGRESS NOTES
Problem: Communication Impaired (Adult)  Goal: *Acute Goals and Plan of Care (Insert Text)  Description: Speech Therapy Goals  Initiated 10/18/2021    1. Patient will participate in semantic feature analysis with 90% accuracy within 7 days. Outcome: Progressing Towards Goal   SPEECH LANGUAGE PATHOLOGY TREATMENT  Patient: Yony Gallegos (67 y.o. female)  Date: 10/19/2021  Diagnosis: CV[facility]cere[facility] vascular [facility] ([facility] [I63.9] <principal problem not specified>         ASSESSMENT:  Patient continues with mild word retrieval deficits at the conversational level. She is often able to generate the correct word when given increased time. She will benefit from continued SLP treatment, though suspect she is nearing her baseline level of function. PLAN:  Patient continues to benefit from skilled intervention to address the above impairments. Continue treatment per established plan of care. Discharge Recommendations: To Be Determined     SUBJECTIVE:   Patient stated I have to think. OBJECTIVE:   Mental Status:  Neurologic State: Alert, Eyes open to stimulus  Orientation Level: Oriented X4  Cognition: Follows commands (decreased safety awareness)  Perception: Appears intact  Perseveration: No perseveration noted  Safety/Judgement: Awareness of environment  Treatment & Interventions:                                     Language Comprehension and Expression:     Verbal Expression     Semantic feature analysis 85% accuracy with increased time. Divergent naming at the simple level with a goal of 10 100% accuracy. Pain:  Pain Scale 1: Numeric (0 - 10)  Pain Intensity 1: 0       After treatment:   Patient left in no apparent distress in bed and Call bell within reach    COMMUNICATION/EDUCATION:   Patient was educated regarding purpose of SLP visit. She verbalized understanding.      The patient's plan of care including recommendations, planned interventions, and recommended diet changes were discussed with: Registered nurse.      Irene Diaz, SLP  Time Calculation: 15 mins

## 2021-10-19 NOTE — PROGRESS NOTES
Hospitalist Progress Note  Domenico Jade MD  Answering service: [phone] from in house phone      Date of Service:  10/19/2021  NAME:  Remi Ramírez  :  1940  MRN:  [id number]    Admission Summary:   81F p/w L- CVA. Had L ICA stent placed, was in ICU. Downgrading to floor again. Interval history / Subjective:   Patient seen and examined at bedside, feels      Assessment & Plan:     CVA/TIA: symptoms resoved within 5-6hours. - CTA head/neck- Severe 80-90% stenosis L- proximal internal carotid artery   - MRI L parietal lobe ischemic CVA. 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 Repeat CT head stable. - Neurology following - SLP/PT/OT- Start Atorvastatin 80mg   - Aspirin and Plavix - A1c 5.6, LDL 77  - dizziness/nausea/orthostatic hypotension- NIS started IVFs, titrated midodrine up, Stockings.      Hypertension:- Hydralazine PRN. monitor  Elevated creatinine: mild. IVF. Encourage po intake. GERD - Continue PPI  Depression - Supportive treatment - Continue home dose wellbutrin   #. Elevated TSH: mild, check fT4 levels. Code status: Partial.  DVT prophylaxis: SCDs  Care Plan discussed with: Patient/Family and Nurse  Disposition: TBD 1-2days     Hospital Problems  Date Reviewed: 10/22/2018        Codes Class Noted POA    Orthostatic hypotension ICD-10-CM: I95.1  ICD-9-CM: 458.0  10/18/2021 No        CVA (cerebral vascular accident) [facility]) ICD-10-CM: I63.9  ICD-9-CM: 434.91  10/14/2021 Yes            Review of Systems:   Pertinent items are mentioned in interval history. Vital Signs:    Last 24hrs VS reviewed since prior progress note.  Most recent are:  Visit Vitals  BP (!) 94/40   Pulse 60   Temp 98 °F (36.7 °C)   Resp 17   Ht 5' 4\" (1.626 m)   Wt 76.1 kg (167 lb 12.3 oz)   SpO2 96%   BMI 28.80 kg/m²       No intake or output data in the 24 hours ending 10/19/21 7643     Physical Examination:   Evaluated face to face and examined 10/19/21    General:  Alert, oriented, No acute distress. Pleasant elderly, obese [employer]  Resp:  No accessory muscle use, Good AE, no wheezes. no crepitations  Abd:  Soft, non-tender, non-distended, BS+  Extremities:  No cyanosis or clubbing, no significant edema  Neuro:  Grossly normal, no focal neuro deficits, follows commands, speech wnl- all deficits on RUE resolved  Psych:  Good insight, not agitated. Data Review:    Review and/or order of clinical lab test  Review and/or order of tests in the radiology section of CPT  Review and/or order of tests in the medicine section of CPT  Labs:     Recent Labs     10/19/21  0203 10/17/21  0119   WBC 10.2 10.5   HGB 10.2* 9.1*   HCT 31.5* 28.0*    249     Recent Labs     10/19/21  0203 10/18/21  1505 10/17/21  0053    139 144   K 3.8 4.0 3.8   * 111* 117*   CO2 24 22 24   BUN 14 17 20   CREA 1.22* 1.12* 1.19*   GLU 88 87 107*   CA 8.7 8.7 8.4*     Recent Labs     10/17/21  0053   ALT 16      TBILI 0.2   TP 5.6*   ALB 2.6*   GLOB 3.0     No results for input(s): INR, PTP, APTT, INREXT, INREXT in the last 72 hours. No results for input(s): FE, TIBC, PSAT, FERR in the last 72 hours. No results found for: FOL, RBCF   No results for input(s): PH, PCO2, PO2 in the last 72 hours. No results for input(s): CPK, CKNDX, TROIQ in the last 72 hours.     No lab exists for component: CPKMB  Lab Results   Component Value Date/Time    Cholesterol, total 180 10/15/2021 03:15 AM    HDL Cholesterol 85 10/15/2021 03:15 AM    LDL, calculated 77.2 10/15/2021 03:15 AM    Triglyceride 89 10/15/2021 03:15 AM    CHOL/HDL Ratio 2.1 10/15/2021 03:15 AM     No results found for: GLUCPOC  No results found for: COLOR, APPRN, SPGRU, REFSG, HECTOR, PROTU, GLUCU, KETU, BILU, UROU, JAMES, LEUKU, GLUKE, EPSU, BACTU, WBCU, RBCU, CASTS, UCRY  Medications Reviewed:     Current Facility-Administered Medications Medication Dose Route Frequency    influenza vaccine 2021-22 (6 mos+)(PF) (FLUARIX/FLULAVAL/FLUZONE QUAD) injection 0.5 mL  1 Each IntraMUSCular PRIOR TO DISCHARGE    meclizine (ANTIVERT) tablet 12.5 mg  12.5 mg Oral Q6H PRN    lactated Ringers infusion  75 mL/hr IntraVENous CONTINUOUS    midodrine (PROAMATINE) tablet 10 mg  10 mg Oral TID WITH MEALS    metoclopramide HCl (REGLAN) tablet 10 mg  10 mg Oral Q6H PRN    pantoprazole (PROTONIX) tablet 40 mg  40 mg Oral ACB    buPROPion SR (WELLBUTRIN SR) tablet 150 mg  150 mg Oral BID    DULoxetine (CYMBALTA) capsule 20 mg  20 mg Oral DAILY    ondansetron (ZOFRAN) injection 4 mg  4 mg IntraVENous Q6H PRN    butalbital-acetaminophen-caffeine (FIORICET, ESGIC) -40 mg per tablet 1 Tablet  1 Tablet Oral Q6H PRN    acetaminophen (TYLENOL) tablet 650 mg  650 mg Oral Q6H PRN    ticagrelor (BRILINTA) tablet 45 mg  45 mg Oral Q12H    ezetimibe (ZETIA) tablet 10 mg  10 mg Oral DAILY    magnesium hydroxide (MILK OF MAGNESIA) 400 mg/5 mL oral suspension 30 mL  30 mL Oral DAILY PRN    aspirin chewable tablet 81 mg  81 mg Oral DAILY   ______________________________________________________________________  EXPECTED LENGTH OF STAY: 2d 2h  ACTUAL LENGTH OF STAY:          5               Yaw Zamora MD

## 2021-10-19 NOTE — PROGRESS NOTES
Bedside shift change report given to Юлия Segundo (oncoming nurse) by [employer] (student) (offgoing nurse). Report included the following information SBAR, Kardex, Cardiac Rhythm NSR/SB and Dual Neuro Assessment.

## 2021-10-19 NOTE — PROGRESS NOTES
Problem: Mobility Impaired (Adult and Pediatric)  Goal: *Acute Goals and Plan of Care (Insert Text)  Outcome: Progressing Towards Goal   PHYSICAL THERAPY TREATMENT  Patient: Pepito Stuart (89 y.o. female)  Date: 10/19/2021  Diagnosis: [facility] [I63.9] <principal problem not specified>       Precautions:    Chart, physical therapy assessment, plan of care and goals were reviewed. ASSESSMENT  Patient continues with skilled PT services and is progressing towards goals. She reports decreased dizziness and is agreeable to participate. Patient continues to demonstrates the need for frequent VC for RW safety including keeping the walker on the ground and keeping the walker with her to complete transfers. Patient demonstrates fair dynamic standing balance with no evidence of major LOB with mobility today. Gait distance and stair training continue to be deferred due to BP symptoms and consistent need for safety cues. Current Level of Function Impacting Discharge (mobility/balance): Min A     Other factors to consider for discharge: medical stability, hx of frequent falls, decreased balance, decreased independence with functional mobility         PLAN :  Patient continues to benefit from skilled intervention to address the above impairments. Continue treatment per established plan of care. to address goals. Recommendation for discharge: (in order for the patient to meet his/her long term goals)  Therapy 3 hours per day 5-7 days per week    This discharge recommendation:  Has been made in collaboration with the attending provider and/or case management    IF patient discharges home will need the following DME: to be determined (TBD)       SUBJECTIVE:   Patient stated Remington Pena thank you girls.     OBJECTIVE DATA SUMMARY:   Critical Behavior:  Neurologic State: Alert, Eyes open to stimulus  Orientation Level: Oriented X4  Cognition: Follows commands (decreased safety awareness)  Safety/Judgement: Awareness of environment  Functional Mobility Training:  Bed Mobility:           Scooting: Stand-by assistance        Transfers:  Sit to Stand: Contact guard assistance  Stand to Sit: Contact guard assistance                             Balance:  Sitting: Intact; With support  Standing: Impaired; With support  Standing - Static: Fair  Standing - Dynamic : Fair;Constant support  Ambulation/Gait Training:  Distance (ft): 90 Feet (ft)  Assistive Device: Gait belt;Walker, rolling  Ambulation - Level of Assistance: Minimal assistance        Gait Abnormalities: Decreased step clearance;Trunk sway increased        Base of Support: Center of gravity altered;Narrowed     Speed/Anusha: Fluctuations  Step Length: Right shortened;Left shortened        Interventions: Verbal cues; Safety awareness training           Stairs: Therapeutic Exercises:     Pain Rating:      Activity Tolerance:   Good    After treatment patient left in no apparent distress:   Sitting in chair, Call bell within reach, and Bed / chair alarm activated    COMMUNICATION/COLLABORATION:   The patients plan of care was discussed with: Occupational therapist and Registered nurse.      Adelaida Rodriguez, PT   Time Calculation: 25 mins

## 2021-10-19 NOTE — PROGRESS NOTES
Neurointerventional Surgery Progress Note  Ariellashay Jorge AGACNP-BC  Neurocritical Care NP      Admit Date: 10/14/2021   LOS: 5 days        Daily Progress Note: 10/19/2021    S/P: POD 4 diagnostic cerebral angiogram and left sided carotid artery stenting        Subjective:     No acute events overnight patient is still complaining of left frontal headache 3/10 but states that Fioricet is working to treat her headache. She feels that this headache started right after stenting was completed. She reports some cloudiness with her vision, however she reports she can still see ok, she feels that she is in a \"fog\" because of lightheadedness. She is dizzy even in supine position but especially with sitting and standing. She still endorses right hand weakness, incoordination with the right hand, and numbness/tingling in fingers of right hand. She denies any speech difficulty, chest pain, or shortness of breath. BP is running in the low 100s today. PT/OT recommending IPR. Family to give choice tomorrow.     Current Facility-Administered Medications   Medication Dose Route Frequency Provider Last Rate Last Admin    influenza vaccine 2021-22 (6 mos+)(PF) (FLUARIX/FLULAVAL/FLUZONE QUAD) injection 0.5 mL  1 Each IntraMUSCular PRIOR TO DISCHARGE Girma Whitlock MD        meclizine (ANTIVERT) tablet 12.5 mg  12.5 mg Oral Q6H PRN Lissy Carranza NP   12.5 mg at 10/18/21 1932    lactated Ringers infusion  75 mL/hr IntraVENous CONTINUOUS Lissy Carranza NP 75 mL/hr at 10/19/21 0409 75 mL/hr at 10/19/21 0409    midodrine (PROAMATINE) tablet 10 mg  10 mg Oral TID WITH MEALS Girma Whitlock MD   10 mg at 10/19/21 1207    metoclopramide HCl (REGLAN) tablet 10 mg  10 mg Oral Q6H PRN Lary Fossa, NP        pantoprazole (PROTONIX) tablet 40 mg  40 mg Oral ACB RAHUL Rivera   40 mg at 10/19/21 0833    buPROPion SR (WELLBUTRIN SR) tablet 150 mg  150 mg Oral BID Caleb Maza NP   150 mg at 10/19/21 1015    DULoxetine (CYMBALTA) capsule 20 mg  20 mg Oral DAILY Jovanni GIL NP-C   20 mg at 10/19/21 1016    ondansetron (ZOFRAN) injection 4 mg  4 mg IntraVENous Q6H PRN Jovanni GIL NP-C   4 mg at 10/18/21 9025    butalbital-acetaminophen-caffeine (FIORICET, ESGIC) -40 mg per tablet 1 Tablet  1 Tablet Oral Q6H PRN Evans Vasquez MD   1 Tablet at 10/18/21 1002    acetaminophen (TYLENOL) tablet 650 mg  650 mg Oral Q6H PRN Liseth Carranza NP        ticagrelor (BRILINTA) tablet 45 mg  45 mg Oral Q12H Yuli Gutierres NP   45 mg at 10/19/21 1015    ezetimibe (ZETIA) tablet 10 mg  10 mg Oral DAILY Suzy Dorsey NP   10 mg at 10/19/21 1015    magnesium hydroxide (MILK OF MAGNESIA) 400 mg/5 mL oral suspension 30 mL  30 mL Oral DAILY PRN Lashonda Hoang NP        aspirin chewable tablet 81 mg  81 mg Oral DAILY Yuli Gutierres NP   81 mg at 10/19/21 1015        HPI: Jerel Scheuermann is a [de-identified] y.o. female with a past medical history of GERD, HLD, hypertension and depression who presented to the emergency department via EMS on 10/14/2021 with slurred speech and right-sided weakness. Per review of notes, she is unsure of time of onset and thinks she may have woken up this way however, she did not notice slurred speech until speaking with her daughter on the phone that morning around 0715. She was deemed not a candidate for TPA by teleneurology due to unknown time of onset. The patient reports that she takes a baby aspirin every day. Per review of notes, the patient also reported  a [facility] for evaluation of right leg weakness which she states has been getting progressively worse over the past several years and causing her to have multiple falls. Initial CT of the head showed no acute process. CT of the head and neck showed a severe 80 to 90% stenosis of the left proximal internal carotid artery.   Out to moderate stenosis origin of the left common carotid artery. Approximately 20% stenosis of the proximal right ICA. No definitive intraluminal filling defect or other CTA findings of large vessel intracranial occlusion. CT perfusion showed a questionable minimal diminished flow/perfusion of the left posterior temporal lobe which may be artifactual.  Otherwise no flow/perfusion abnormality demonstrated. MRI of the brain showed restricted diffusion in the left posterior superior temporal/parietal cortex suggesting a degree of acute cortical infarction. Questionable punctate focus versus artifact in the left parietal/occipital junction. Additionally, questionable artifact versus minimal restricted diffusion left anterior frontal subcortical region. 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 Neurointerventional Surgery is following due to the left carotid artery stenosis. The patient underwent a diagnostic cerebral angiogram and left sided carotid artery stenting on 10/15/2021 with Dr. Hubert Pabon. No Known Allergies    Review of Systems:  Pertinent items are noted in the History of Present Illness. Objective:     Vital signs  Temp (24hrs), Av.9 °F (36.6 °C), Min:97.6 °F (36.4 °C), Max:98.2 °F (36.8 °C)   No intake/output data recorded.   10/17 1901 - 10/19 0700  In: 240 [P.O.:240]  Out: -     Visit Vitals  BP (!) 115/52 (BP 1 Location: Left upper arm, BP Patient Position: At rest)   Pulse 60   Temp 97.6 °F (36.4 °C)   Resp 14   Ht 5' 4\" (1.626 m)   Wt 167 lb 12.3 oz (76.1 kg)   SpO2 96%   BMI 28.80 kg/m²      O2 Device: None (Room air)     Pain control  Pain Assessment  Pain Scale 1: Numeric (0 - 10)  Pain Intensity 1: 0  Pain Location 1: Head  Pain Description 1: Dull  Pain Intervention(s) 1: Medication (see MAR)    PT/OT  Gait     Gait  Base of Support: Center of gravity altered, Narrowed  Speed/Anusha: Fluctuations  Step Length: Right shortened, Left shortened  Gait Abnormalities: Decreased step clearance, Trunk sway increased  Ambulation - Level of Assistance: Minimal assistance  Distance (ft): 90 Feet (ft)  Assistive Device: Gait belt, Walker, rolling  Interventions: Verbal cues, Safety awareness training   Interventions: Verbal cues, Safety awareness training         Vitals:    10/19/21 1037 10/19/21 1207 10/19/21 1348 10/19/21 1418   BP:  117/71 (!) 115/52    Pulse:  (!) 54 (!) 53 60   Resp:   14    Temp:   97.6 °F (36.4 °C)    SpO2:       Weight:       Height: 5' 4\" (1.626 m)           Physical Exam:  GENERAL: alert, cooperative, no distress  LUNG: clear to auscultation bilaterally  HEART: regular rate and rhythm, sinus bradycardia on the monitor, S1, S2 normal, no murmur, click, rub or gallop  EXTREMITIES:  extremities normal, atraumatic, no cyanosis or edema, distal pulses +1 bilaterally. SKIN: Skin cool to touch. Appropriate for ethnicity. Right groin site, nontender to palpation, clean, dry, and intact. No hematoma or bleeding noted. Bruising noted around site. Neurologic Exam:  Mental Status:  Alert and oriented x 4. Appropriate affect, mood and behavior. Language:    Normal fluency, repetition, comprehension and naming. Cranial Nerves:        Pupils equal, round and reactive to light. Visual fields full to confrontation. Extraocular movements intact. Facial sensation intact. No facial asymmetry. No dysarthria. Tongue protrudes to midline, palate elevates symmetrically. Motor:    Strength in RUE 4+/5, no drift present in arms. Right leg drift present. RLE 4/5 with leg extended and some weakness noted with dorsiflexion on right foot, otherwise 5/5 strength. LUE and LLE moves purposefully with 5/5 strength. Bulk and tone normal.      No involuntary movements. Sensation:     Decreased sensation in the right arm to light touch, otherwise sensation intact. No neglect. Coordination & Gait: No ataxia with finger-to-nose and heel-to-shin bilaterally.   Gait deferred. 24 hour results:    Recent Results (from the past 24 hour(s))   METABOLIC PANEL, BASIC    Collection Time: 10/19/21  2:03 AM   Result Value Ref Range    Sodium 140 136 - 145 mmol/L    Potassium 3.8 3.5 - 5.1 mmol/L    Chloride 111 (H) 97 - 108 mmol/L    CO2 24 21 - 32 mmol/L    Anion gap 5 5 - 15 mmol/L    Glucose 88 65 - 100 mg/dL    BUN 14 6 - 20 MG/DL    Creatinine 1.22 (H) 0.55 - 1.02 MG/DL    BUN/Creatinine ratio 11 (L) 12 - 20      GFR est AA 51 (L) >60 ml/min/1.73m2    GFR est non-AA 42 (L) >60 ml/min/1.73m2    Calcium 8.7 8.5 - 10.1 MG/DL   CBC W/O DIFF    Collection Time: 10/19/21  2:03 AM   Result Value Ref Range    WBC 10.2 3.6 - 11.0 K/uL    RBC 3.42 (L) 3.80 - 5.20 M/uL    HGB 10.2 (L) 11.5 - 16.0 g/dL    HCT 31.5 (L) 35.0 - 47.0 %    MCV 92.1 80.0 - 99.0 FL    MCH 29.8 26.0 - 34.0 PG    MCHC 32.4 30.0 - 36.5 g/dL    RDW 13.5 11.5 - 14.5 %    PLATELET 128 802 - 992 K/uL    MPV 10.9 8.9 - 12.9 FL    NRBC 0.0 0  WBC    ABSOLUTE NRBC 0.00 0.00 - 0.01 K/uL          Imaging:  CT of head on 10/14/2021 at 0821 shows  IMPRESSION  No acute process or change compared to the prior exam.    CTA of head and neck on 10/14/2021 at 0905 shows     IMPRESSION  1. Severe 80-90% stenosis left proximal internal carotid artery. See above. 2. Mild/moderate stenosis origin left common carotid artery. 3. Approximately 20% stenosis proximal right internal carotid artery. 4. No definitive intraluminal filling defect or other CTA findings of large  vessel intracranial occlusion. 5. Asymmetric decreased size left MCA may be related to flow limitation from the  internal carotid artery stenosis. CT perfusion on 10/14/2021 at 0904 shows  IMPRESSION  Possible artifact left temporal lobe. Correlate clinically. Otherwise no  flow/perfusion abnormality demonstrated. MRI of brain on 10/14/2021 at 1153 shows  IMPRESSION  1. Findings consistent with a degree of acute ischemic injury left parietal  lobe.  See above descriptions. 2. Left sella/pituitary cystic mass. Incompletely evaluated. Depending on  clinical circumstance consider follow-up MRI with contrast and with attention to  the pituitary. CT of head on 10/16/2021 at 1449 shows  IMPRESSION  1. Left parietal lobe infarct seen on prior MRI are not conspicuous on current  CT. 2.  Stable cystic lesion in the left aspect pituitary/sella. Again follow-up  with contrast enhanced pituitary protocol MRI can be obtained as an outpatient  nonemergently for further characterization       CT Results  (Last 48 hours)               10/17/21 2026  CT HEAD WO CONT Final result    Impression:  1. No acute intracranial abnormality. Acute left parietal lobe infarcts seen on   prior MRI is not well demonstrated on CT. 2. Stable cystic lesion in the left aspect of the pituitary/sella. Narrative:  EXAM:  CT head without contrast       INDICATION: Persistent dizziness and nausea. COMPARISON: CT 10/16/2021. MRI 10/14/2021. TECHNIQUE: Axial noncontrast head CT from foramen magnum to vertex. Coronal and   sagittal reformatted images were obtained. CT dose reduction was achieved   through use of a standardized protocol tailored for this examination and   automatic exposure control for dose modulation. Adaptive statistical iterative   reconstruction (ASIR) was utilized. FINDINGS:  There is diffuse age-related parenchymal volume loss. The ventricles   and sulci are age-appropriate without hydrocephalus. There is no mass effect or   midline shift. There is no intracranial hemorrhage or extra-axial fluid   collection. Acute left parietal lobe infarcts seen on prior MRI are not well   demonstrated on CT. There is stable mild chronic microvascular ischemic disease   in the periventricular white matter. There is no new abnormal parenchymal   attenuation. The basal cisterns are patent.  A cystic lesion in the left aspect   of the pituitary/sella is again noted.       The osseous structures are intact. There is a trace fluid level in the right   maxillary sinus. The remaining paranasal sinuses and mastoid air cells are   clear. Assessment:     Active Problems:    CVA (cerebral vascular accident) ([address] (10/14/2021)      Orthostatic hypotension (10/18/2021)        Plan:    Left ischemic infarct in the setting of severe L ICA Stenosis   - s/p cerebral angiogram and left carotid artery stenting on 10/15/2021  - lipid panel shows LDL 77.2, goal <70 continue Zetia 10 mg daily, can resume Repatha outpatient as patient was on this previously per Neurology (patient reports she has had nausea with statins)  - continue Aspirin 81 mg daily and Brilinta 45 mg BID (patient therapeutic on aspirin and brilinta)  - Hgb A1C ok at 5.6  - SBP goal <140 s/p carotid stenting  - repeat Head CT on 10/17 shows no acute intracranial abnormality. Acute left parietal lobe infarct seen on prior MRI is not well demonstrated on CT. Stable cystic lesion in the left aspect of the pituitary/sella.   - PT/OT/SLP evals (PT/OT recommending inpatient rehab)    Intractable Headache (improved)  - Continue Tylenol, Fioricet, Reglan PRN    Left Sella/Pituitary Cystic Mass  - as seen on imaging  - can follow up with Neurosurgery outpatient for further discussion and work-up (discussed with Dr. Monet Palmer (neurosurgery on call)    Orthostatic hypotension s/p carotid stenting   - increase midodrine to 10 mg TID   - started maintenance IV fluids LR at 75 ml/hr for hydration, encouraged PO intake and fluids for hydration/nutritition (Hospitalist consulted nutrition services)    Dizziness   - start Meclizine 12.5 mg PO every 6 hours as needed, can increase if needed      Activity: Up with assistance   DVT ppx: SCDs   Dispo: TBD    Plan d/w Dr. Rekha Lazo, Dr. Lanie Francis (Hospitalist) RN, and patient.        Kaushal Sharp NP

## 2021-10-19 NOTE — PROGRESS NOTES
Comprehensive Nutrition Assessment    Type and Reason for Visit: Initial, Consult    Nutrition Recommendations/Plan:      I ate well at lunch today. Nutrition Assessment:    Pt admitted with CVA. PMHx: GERD, HTN, HLD, Depression. (L) ischemic infarct 2/2 severe (L) ICA stenosis. 10/15 (L) ICA stented. Orthostatic hypotension with some nausea and dizziness. Transfer to rehab in the next 24-48 hr per pt. Patient well-nourished PTA. Relates PO intake has not been good d/t inability to select menu. Ate ~100% of lunch today since able to select the meal. Prefers cold foods; easily nauseated by smells from under dome with hot food. Has selected next few meals as well. Pt declined oral supplements at this time-as long as receives foods she can tolerate then PO intake is good. Encouraged patient to request RD visit if any problems with meals during her stay. Malnutrition Assessment:  Malnutrition Status:  No malnutrition    Context:  Acute illness       Nutritionally Significant Medications:   Zetia, Protonix, LR @ 75 ml/hr, (Reglan, Meclizine and Zofran prn)    Estimated Daily Nutrient Needs:  Energy (kcal): 1580 (MSJ x 1.3); Weight Used for Energy Requirements:  (76 kg)  Protein (g): 76; Weight Used for Protein Requirements: Current  Fluid (ml/day):  ; Method Used for Fluid Requirements: 1 ml/kcal    Nutrition Related Findings:       BM: 10/14  Edema: None  Wounds:  None       Current Nutrition Therapies:   Diet: Regular  Supplements: None  Additional Caloric Sources:  None  Meal intake:   Patient Vitals for the past 168 hrs:   % Diet Eaten   10/18/21 0800 1 - 25%   10/16/21 1600 26 - 50%   10/16/21 1300 26 - 50%   10/16/21 0800 51 - 75%   10/14/21 1841 51 - 75%       Anthropometric Measures:  · Height:  5' 4\" (162.6 cm)  · Current Body Wt:  76.1 kg (167 lb 12.3 oz)   · Admission Body Wt:  162 lb    · Ideal Body Wt:  120:  139.8 %   · BMI Categories:  Overweight (BMI 25.0-29. 9)     Wt Readings from Last 10 Encounters:   10/18/21 76.1 kg (167 lb 12.3 oz)   12/07/12 70.7 kg (155 lb 12.8 oz)   03/30/11 68.9 kg (152 lb)       Nutrition Diagnosis:   · Inadequate oral intake related to cognitive or neurological impairment as evidenced by nausea, intake 26-50%, intake 51-75%. Nutrition Interventions:   Food and/or Nutrient Delivery: Continue current diet  Nutrition Education and Counseling: No recommendations at this time  Coordination of Nutrition Care: Continue to monitor while inpatient    Goals:  Consume at least 75% of meals x 5-7 days.        Nutrition Monitoring and Evaluation:   Behavioral-Environmental Outcomes: None identified  Food/Nutrient Intake Outcomes: Food and nutrient intake  Physical Signs/Symptoms Outcomes: Biochemical data, Weight    Discharge Planning:    Continue current diet     Chrsita Zarco RD CNSC  Contact: [employer]

## 2021-10-19 NOTE — ADVANCED PRACTICE NURSE
Patient with continued ongoing dizziness, even at rest, worsened by position changes. She also has had four days of hypotension related to carotid bulb manipulation from left carotid artery stenting ( no angioplasty). Recommend MRI Brain WO contrast to rule out posterior circulation stroke , r/o posterior ischemia related to hypotension.      Terrie Garcia, ACNP  NIS

## 2021-10-19 NOTE — PROGRESS NOTES
Problem: Pain  Goal: *Control of Pain  Outcome: Progressing Towards Goal     Problem: Patient Education: Go to Patient Education Activity  Goal: Patient/Family Education  Outcome: Progressing Towards Goal     Problem: Ischemic Stroke: Discharge Outcomes  Goal: *Optimal pain control at patient's stated goal  Outcome: Progressing Towards Goal     Problem: Falls - Risk of  Goal: *Absence of Falls  Description: Document Curt Garcia Fall Risk and appropriate interventions in the flowsheet. Outcome: Progressing Towards Goal  Note: Fall Risk Interventions:  Mobility Interventions: Patient to call before getting OOB, Communicate number of staff needed for ambulation/transfer         Medication Interventions: Patient to call before getting OOB, Teach patient to arise slowly    Elimination Interventions: Call light in reach, Patient to call for help with toileting needs, Toileting schedule/hourly rounds    History of Falls Interventions: Door open when patient unattended         Problem: Patient Education: Go to Patient Education Activity  Goal: Patient/Family Education  Outcome: Progressing Towards Goal     Problem: Pressure Injury - Risk of  Goal: *Prevention of pressure injury  Description: Document Robe Scale and appropriate interventions in the flowsheet.   Outcome: Progressing Towards Goal  Note: Pressure Injury Interventions:  Sensory Interventions: Keep linens dry and wrinkle-free, Minimize linen layers    Moisture Interventions: Minimize layers, Absorbent underpads, Limit adult briefs    Activity Interventions: Increase time out of bed, Assess need for specialty bed    Mobility Interventions: PT/OT evaluation, Pressure redistribution bed/mattress (bed type)    Nutrition Interventions: Document food/fluid/supplement intake

## 2021-10-20 LAB
APPEARANCE UR: CLEAR
BACTERIA URNS QL MICRO: NEGATIVE /HPF
BILIRUB UR QL: NEGATIVE
COLOR UR: ABNORMAL
EPITH CASTS URNS QL MICRO: ABNORMAL /LPF
GLUCOSE UR STRIP.AUTO-MCNC: NEGATIVE MG/DL
HGB UR QL STRIP: NEGATIVE
HYALINE CASTS URNS QL MICRO: ABNORMAL /LPF (ref 0–5)
KETONES UR QL STRIP.AUTO: NEGATIVE MG/DL
LEUKOCYTE ESTERASE UR QL STRIP.AUTO: ABNORMAL
NITRITE UR QL STRIP.AUTO: NEGATIVE
PH UR STRIP: 7.5 [PH] (ref 5–8)
PROT UR STRIP-MCNC: NEGATIVE MG/DL
RBC #/AREA URNS HPF: ABNORMAL /HPF (ref 0–5)
SARS-COV-2, XPLCVT: NOT DETECTED
SOURCE, COVRS: NORMAL
SP GR UR REFRACTOMETRY: 1.01 (ref 1–1.03)
T3FREE SERPL-MCNC: 2.1 PG/ML (ref 2.2–4)
UA: UC IF INDICATED,UAUC: ABNORMAL
UROBILINOGEN UR QL STRIP.AUTO: 1 EU/DL (ref 0.2–1)
WBC URNS QL MICRO: ABNORMAL /HPF (ref 0–4)

## 2021-10-20 PROCEDURE — 74011250637 HC RX REV CODE- 250/637: Performed by: INTERNAL MEDICINE

## 2021-10-20 PROCEDURE — 74011250637 HC RX REV CODE- 250/637: Performed by: NURSE PRACTITIONER

## 2021-10-20 PROCEDURE — 65660000000 HC RM CCU STEPDOWN

## 2021-10-20 PROCEDURE — 97530 THERAPEUTIC ACTIVITIES: CPT

## 2021-10-20 PROCEDURE — 81001 URINALYSIS AUTO W/SCOPE: CPT

## 2021-10-20 PROCEDURE — 97116 GAIT TRAINING THERAPY: CPT

## 2021-10-20 PROCEDURE — 99232 SBSQ HOSP IP/OBS MODERATE 35: CPT | Performed by: RADIOLOGY

## 2021-10-20 PROCEDURE — 97535 SELF CARE MNGMENT TRAINING: CPT

## 2021-10-20 RX ORDER — MIDODRINE HYDROCHLORIDE 5 MG/1
5 TABLET ORAL
Status: DISCONTINUED | OUTPATIENT
Start: 2021-10-20 | End: 2021-10-24 | Stop reason: HOSPADM

## 2021-10-20 RX ADMIN — PANTOPRAZOLE SODIUM 40 MG: 40 TABLET, DELAYED RELEASE ORAL at 07:13

## 2021-10-20 RX ADMIN — EZETIMIBE 10 MG: 10 TABLET ORAL at 08:26

## 2021-10-20 RX ADMIN — TICAGRELOR 45 MG: 90 TABLET ORAL at 08:26

## 2021-10-20 RX ADMIN — ASPIRIN 81 MG CHEWABLE TABLET 81 MG: 81 TABLET CHEWABLE at 08:26

## 2021-10-20 RX ADMIN — DULOXETINE HYDROCHLORIDE 20 MG: 20 CAPSULE, DELAYED RELEASE ORAL at 08:26

## 2021-10-20 RX ADMIN — BUPROPION HYDROCHLORIDE 150 MG: 150 TABLET, EXTENDED RELEASE ORAL at 08:26

## 2021-10-20 RX ADMIN — MIDODRINE HYDROCHLORIDE 10 MG: 5 TABLET ORAL at 08:26

## 2021-10-20 RX ADMIN — TICAGRELOR 45 MG: 90 TABLET ORAL at 21:11

## 2021-10-20 RX ADMIN — MIDODRINE HYDROCHLORIDE 10 MG: 5 TABLET ORAL at 12:12

## 2021-10-20 NOTE — PROGRESS NOTES
Bedside and Verbal shift change report given to [id number] (oncoming nurse) by Mj Wells (offgoing nurse). Report included the following information SBAR, Kardex, Procedure Summary, Intake/Output, MAR, Accordion and Recent Results.

## 2021-10-20 NOTE — PROGRESS NOTES
Hospitalist Progress Note  Margot Gomez MD  Answering service: [phone] from in house phone      Date of Service:  10/20/2021  NAME:  Filippo Jennings  :  1940  MRN:  [id number]    Admission Summary:   81F p/w L- CVA. Had L ICA stent placed, was in ICU. Downgrading to floor again. Interval history / Subjective:   Patient seen and examined at bedside, comfortable, no acute events overnight, still has some mild dizziness feeling. Repeat MRI done. Assessment & Plan:     CVA/TIA: symptoms resoved within 5-6hours. - CTA head/neck- Severe 80-90% stenosis L- proximal internal carotid artery   - MRI L parietal lobe ischemic CVA. 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 Repeat CT head stable. - Neurology following - SLP/PT/OT- Start Atorvastatin 80mg   - Aspirin and Plavix - A1c 5.6, LDL 77  - dizziness/nausea/orthostatic hypotension- NIS started IVFs, titrated midodrine up, Stockings. - repeat MRI brain: slightly increased size of prev L parietal CVA, new small infarct in L frontal     Hypertension:- Hydralazine PRN. monitor  Elevated creatinine: mild. IVF. Encourage po intake. GERD - Continue PPI  Depression - Supportive treatment - Continue home dose wellbutrin   #. Elevated TSH: mild, check fT4 levels. Code status: Partial.  DVT prophylaxis: SCDs  Care Plan discussed with: Patient/Family and Nurse  Disposition: [address] Problems  Date Reviewed: 10/22/2018        Codes Class Noted POA    Orthostatic hypotension ICD-10-CM: I95.1  ICD-9-CM: 458.0  10/18/2021 No        CVA (cerebral vascular accident) [facility]) ICD-10-CM: I63.9  ICD-9-CM: 434.91  10/14/2021 Yes            Review of Systems:   Pertinent items are mentioned in interval history. Vital Signs:    Last 24hrs VS reviewed since prior progress note.  Most recent are:  Visit Vitals  BP (!) 112/47 Pulse 72   Temp 97.9 °F (36.6 °C)   Resp 25   Ht 5' 4\" (1.626 m)   Wt 76 kg (167 lb 8.8 oz)   SpO2 95%   BMI 28.76 kg/m²       No intake or output data in the 24 hours ending 10/20/21 1022     Physical Examination:   Evaluated face to face and examined 10/20/21    General:  Alert, oriented, No acute distress. Pleasant elderly, obese [employer]  Resp:  No accessory muscle use, Good AE, no wheezes. no crepitations  Abd:  Soft, non-tender, non-distended, BS+  Extremities:  No cyanosis or clubbing, no significant edema  Neuro:  Grossly normal, no focal neuro deficits, follows commands, speech wnl- all deficits on RUE resolved  Psych:  Good insight, not agitated. Data Review:    Review and/or order of clinical lab test  Review and/or order of tests in the radiology section of CPT  Review and/or order of tests in the medicine section of CPT  Labs:     Recent Labs     10/19/21  0203   WBC 10.2   HGB 10.2*   HCT 31.5*        Recent Labs     10/19/21  0203 10/18/21  1505    139   K 3.8 4.0   * 111*   CO2 24 22   BUN 14 17   CREA 1.22* 1.12*   GLU 88 87   CA 8.7 8.7     No results for input(s): ALT, AP, TBIL, TBILI, TP, ALB, GLOB, GGT, AML, LPSE in the last 72 hours. No lab exists for component: SGOT, GPT, AMYP, HLPSE  No results for input(s): INR, PTP, APTT, INREXT, INREXT in the last 72 hours. No results for input(s): FE, TIBC, PSAT, FERR in the last 72 hours. No results found for: FOL, RBCF   No results for input(s): PH, PCO2, PO2 in the last 72 hours. No results for input(s): CPK, CKNDX, TROIQ in the last 72 hours.     No lab exists for component: CPKMB  Lab Results   Component Value Date/Time    Cholesterol, total 180 10/15/2021 03:15 AM    HDL Cholesterol 85 10/15/2021 03:15 AM    LDL, calculated 77.2 10/15/2021 03:15 AM    Triglyceride 89 10/15/2021 03:15 AM    CHOL/HDL Ratio 2.1 10/15/2021 03:15 AM     No results found for: GLUCPOC  No results found for: COLOR, APPRN, SPGRU, REFSG, HECTOR, PROTU, [place], BILU, UROU, JAMES, LEUKU, GLUKE, EPSU, BACTU, WBCU, RBCU, CASTS, UCRY  Medications Reviewed:     Current Facility-Administered Medications   Medication Dose Route Frequency    influenza vaccine 2021-22 (6 mos+)(PF) (FLUARIX/FLULAVAL/FLUZONE QUAD) injection 0.5 mL  1 Each IntraMUSCular PRIOR TO DISCHARGE    meclizine (ANTIVERT) tablet 12.5 mg  12.5 mg Oral Q6H PRN    lactated Ringers infusion  75 mL/hr IntraVENous CONTINUOUS    midodrine (PROAMATINE) tablet 10 mg  10 mg Oral TID WITH MEALS    metoclopramide HCl (REGLAN) tablet 10 mg  10 mg Oral Q6H PRN    pantoprazole (PROTONIX) tablet 40 mg  40 mg Oral ACB    buPROPion SR (WELLBUTRIN SR) tablet 150 mg  150 mg Oral BID    DULoxetine (CYMBALTA) capsule 20 mg  20 mg Oral DAILY    ondansetron (ZOFRAN) injection 4 mg  4 mg IntraVENous Q6H PRN    butalbital-acetaminophen-caffeine (FIORICET, ESGIC) -40 mg per tablet 1 Tablet  1 Tablet Oral Q6H PRN    acetaminophen (TYLENOL) tablet 650 mg  650 mg Oral Q6H PRN    ticagrelor (BRILINTA) tablet 45 mg  45 mg Oral Q12H    ezetimibe (ZETIA) tablet 10 mg  10 mg Oral DAILY    magnesium hydroxide (MILK OF MAGNESIA) 400 mg/5 mL oral suspension 30 mL  30 mL Oral DAILY PRN    aspirin chewable tablet 81 mg  81 mg Oral DAILY   ______________________________________________________________________  EXPECTED LENGTH OF STAY: 2d 2h  ACTUAL LENGTH OF STAY:          6               Sharri Brunson MD

## 2021-10-20 NOTE — PROGRESS NOTES
Problem: Self Care Deficits Care Plan (Adult)  Goal: *Acute Goals and Plan of Care (Insert Text)  Description:   FUNCTIONAL STATUS PRIOR TO ADMISSION: Patient was modified independent using a single point cane for functional mobility. However, noted in H&P/reported by patient, had an extensive fall history prior to admission, must climb stairs to access her bedroom/bathroom. HOME SUPPORT: The patient lived alone with children who live locally to provide assistance. Patient reporting option to return to daughter's home with first floor set-up if needed. Occupational Therapy Goals  Initiated 10/18/2021  1. Patient will perform bathing with supervision/set-up within 7 day(s). 2.  Patient will perform upper body dressing with supervision/set-up within 7 day(s). 3.  Patient will perform lower body dressing with supervision/set-up within 7 day(s). 4.  Patient will perform toilet transfers with independence within 7 day(s). 5.  Patient will perform all aspects of toileting with independence within 7 day(s). 6.  Patient will perform grooming tasks standing at sink with supervision within 7 day(s). 7.  Patient will utilize energy conservation techniques during functional activities with verbal cues within 7 day(s). Outcome: Progressing Towards Goal    OCCUPATIONAL THERAPY TREATMENT  Patient: Henry Miguel (89 y.o. female)  Date: 10/20/2021  Diagnosis: CVA (cerebral vascular accident) [facility]) [I63.9] <principal problem not specified>      Precautions:    Chart, occupational therapy assessment, plan of care, and goals were reviewed. ASSESSMENT  Patient continues with skilled OT services and is progressing towards goals. Patient alert and agreeable to participate in therapy today. Steadily improving standing/sitting balance, sit>stand CGA and toilet transfer CGA.  Additionally, pt demonstrated increased awareness of correct positioning, use, and purpose of RW throughout ambulation and ADL tasks (grooming, toileting). She continues to be limted at this time by decreased standing/sitting balance, self reported dizziness (BP stable throughout session), decreased safety awareness, and generalized weakness. Continue to recommend IPR at d/c. Current Level of Function Impacting Discharge (ADLs): CGA to min A for hallway/in room functional mobility, sit>stand CGA, toliet transfer CGA, infer set up to min A for all ADLs. PLAN :  Patient continues to benefit from skilled intervention to address the above impairments. Continue treatment per established plan of care to address goals. Recommendation for discharge: (in order for the patient to meet his/her long term goals)  Therapy 3 hours per day 5-7 days per week    This discharge recommendation:  Has been made in collaboration with the attending provider and/or case management       SUBJECTIVE:   Patient stated I wonder when my blood pressure will get better.     OBJECTIVE DATA SUMMARY:   Cognitive/Behavioral Status:  Neurologic State: Alert  Orientation Level: Oriented X4  Cognition: Follows commands             Functional Mobility and Transfers for ADLs:  Bed Mobility:  Supine to Sit: Stand-by assistance;Bed Modified; Adaptive equipment  Scooting: Stand-by assistance    Transfers:  Sit to Stand: Contact guard assistance  Functional Transfers  Bathroom Mobility: Contact guard assistance  Toilet Transfer : Contact guard assistance       Balance:  Sitting: Intact; With support  Standing: Impaired; With support  Standing - Static: Fair  Standing - Dynamic : Constant support; Fair    ADL Intervention:       Grooming  Washing Hands: Stand-by assistance                        Toileting  Toileting Assistance: Contact guard assistance  Bladder Hygiene: Stand-by assistance  Clothing Management: Contact guard assistance  Cues: Verbal cues provided (for placement of RW)    Cognitive Retraining  Following Commands:  Follows two step commands/directions  Cues: Verbal cues provided    Pain:  Patient did not report pain this session     Activity Tolerance:   Fair, improving but limited by dizziness    After treatment patient left in no apparent distress:   Sitting in chair, Call bell within reach and Bed / chair alarm activated, RN notified    COMMUNICATION/COLLABORATION:   The patients plan of care was discussed with: Physical therapist and Registered nurse. Patient was educated regarding Her deficit(s) of generalized weakness and decreased balance as they relate to Her diagnosis/prior symptoms of CVA. She demonstrated Fair understanding as evidenced by verbal discussion of stroke education.     Patient and/or family was verbally educated on the BE FAST acronym for signs/symptoms of CVA and TIA. BE FAST was written on patient's communication board  for visual education and reinforcement. All questions answered with patient indicating fair understanding.       Mark Sheldon OT  Time Calculation: 23 mins

## 2021-10-20 NOTE — PROGRESS NOTES
Neurointerventional Surgery Progress Note  Marianela Alvarez, AGACNP-BC  Neurocritical Care NP      Admit Date: 10/14/2021   LOS: 6 days        Daily Progress Note: 10/20/2021    S/P: POD 5 diagnostic cerebral angiogram and left sided carotid artery stenting      Subjective:     No acute events overnight. The patient continue to have frontal headache 3/10 which seems to improves with Fioricet. Continues to have dizziness which is better today but worsens with positional changes. She describes the dizziness as \"things moving\". Patient also complained of lethargy and sleeping a lot. Patient was positive for orthostatic which appears to be neurogenic as oppose to be cardiogenic. She still endorses right hand weakness and numbness/tingling in fingers of right hand which is  At a lesser degree. She denies any speech difficulty, chest pain, or shortness of breath. BP has being labile today. Abnormal TSH and T3 today as well as kidney function declined which I discussed with hospitalist for urinalysis. Will also obtain echocardiogram.     Repeat MRI brain wo showed minimal increase in size of previously demonstrated left inferior parietal infarct. New tiny left frontal cortical infarct. Otherwise unchanged. If dizziness continues to  Persist with no improvement with current managment, and echo and ua are normal, patient will need outpatient ENT evaluation and [possible Epley's maneuver or vestibular rehab.     Current Facility-Administered Medications   Medication Dose Route Frequency Provider Last Rate Last Admin    influenza vaccine 2021-22 (6 mos+)(PF) (FLUARIX/FLULAVAL/FLUZONE QUAD) injection 0.5 mL  1 Each IntraMUSCular PRIOR TO DISCHARGE Girma Whitlock MD        meclizine (ANTIVERT) tablet 12.5 mg  12.5 mg Oral Q6H PRN Alexx Carranza Cornea, NP   12.5 mg at 10/18/21 1932    lactated Ringers infusion  75 mL/hr IntraVENous CONTINUOUS Alexx Carranza Cornea, NP 75 mL/hr at 10/19/21 0409 75 mL/hr at 10/19/21 0409    midodrine (PROAMATINE) tablet 10 mg  10 mg Oral TID WITH MEALS Girma Whitlock MD   10 mg at 10/20/21 1212    metoclopramide HCl (REGLAN) tablet 10 mg  10 mg Oral Q6H PRN Winsome Padron NP        pantoprazole (PROTONIX) tablet 40 mg  40 mg Oral ACB Misael Lott R, NP-C   40 mg at 10/20/21 7685    buPROPion SR (WELLBUTRIN SR) tablet 150 mg  150 mg Oral BID Morales Tan NP   150 mg at 10/20/21 0443    DULoxetine (CYMBALTA) capsule 20 mg  20 mg Oral DAILY Misael Lott R, NP-C   20 mg at 10/20/21 0826    ondansetron (ZOFRAN) injection 4 mg  4 mg IntraVENous Q6H PRN Misael GIL, NP-C   4 mg at 10/18/21 7459    butalbital-acetaminophen-caffeine (FIORICET, ESGIC) -40 mg per tablet 1 Tablet  1 Tablet Oral Q6H PRN Sharifa Christie MD   1 Tablet at 10/18/21 1002    acetaminophen (TYLENOL) tablet 650 mg  650 mg Oral Q6H PRN Rey Carranza NP        ticagrelor (BRILINTA) tablet 45 mg  45 mg Oral Q12H Kiley Holm NP   45 mg at 10/20/21 1930    ezetimibe (ZETIA) tablet 10 mg  10 mg Oral DAILY Morales Tan NP   10 mg at 10/20/21 0267    magnesium hydroxide (MILK OF MAGNESIA) 400 mg/5 mL oral suspension 30 mL  30 mL Oral DAILY PRN Claude Leep, NP        aspirin chewable tablet 81 mg  81 mg Oral DAILY Kiley Holm NP   81 mg at 10/20/21 1312        HPI: Chasity Soares is a [de-identified] y.o. female with a past medical history of GERD, HLD, hypertension and depression who presented to the emergency department via EMS on 10/14/2021 with slurred speech and right-sided weakness. Per review of notes, she is unsure of time of onset and thinks she may have woken up this way however, she did not notice slurred speech until speaking with her daughter on the phone that morning around 0715. She was deemed not a candidate for TPA by teleneurology due to unknown time of onset. The patient reports that she takes a baby aspirin every day.  Per review of notes, the patient also reported  a recent appointment with an outpatient neurologist at [facility] for evaluation of right leg weakness which she states has been getting progressively worse over the past several years and causing her to have multiple falls. Initial CT of the head showed no acute process. CT of the head and neck showed a severe 80 to 90% stenosis of the left proximal internal carotid artery. Out to moderate stenosis origin of the left common carotid artery. Approximately 20% stenosis of the proximal right ICA. No definitive intraluminal filling defect or other CTA findings of large vessel intracranial occlusion. CT perfusion showed a questionable minimal diminished flow/perfusion of the left posterior temporal lobe which may be artifactual.  Otherwise no flow/perfusion abnormality demonstrated. MRI of the brain showed restricted diffusion in the left posterior superior temporal/parietal cortex suggesting a degree of acute cortical infarction. Questionable punctate focus versus artifact in the left parietal/occipital junction. Additionally, questionable artifact versus minimal restricted diffusion left anterior frontal subcortical region. 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 Neurointerventional Surgery is following due to the left carotid artery stenosis. The patient underwent a diagnostic cerebral angiogram and left sided carotid artery stenting on 10/15/2021 with Dr. Esha Frazier. No Known Allergies    Review of Systems:  Pertinent items are noted in the History of Present Illness. Objective:     Vital signs  Temp (24hrs), Av °F (36.7 °C), Min:97.9 °F (36.6 °C), Max:98.3 °F (36.8 °C)   No intake/output data recorded. No intake/output data recorded.     Visit Vitals  BP (!) 115/59   Pulse (!) 59   Temp 97.9 °F (36.6 °C)   Resp 15   Ht 5' 4\" (1.626 m)   Wt 76 kg (167 lb 8.8 oz)   SpO2 93%   BMI 28.76 kg/m²      O2 Device: None (Room air)     Pain control  Pain Assessment  Pain Scale 1: Numeric (0 - 10)  Pain Intensity 1: 0  Pain Location 1: Head  Pain Description 1: Dull  Pain Intervention(s) 1: Medication (see MAR)    PT/OT  Gait     Gait  Base of Support: Narrowed, Center of gravity altered  Speed/Anusha: Pace decreased (<100 feet/min), Slow  Step Length: Right shortened, Left shortened  Gait Abnormalities: Decreased step clearance, Trunk sway increased, Shuffling gait  Ambulation - Level of Assistance: Minimal assistance  Distance (ft): 90 Feet (ft)  Assistive Device: Gait belt, Walker, rolling  Interventions: Verbal cues, Safety awareness training   Interventions: Verbal cues, Safety awareness training         Vitals:    10/20/21 1200 10/20/21 1212 10/20/21 1400 10/20/21 1432   BP:  (!) 118/48  (!) 115/59   Pulse: 65 60 65 (!) 59   Resp:    15   Temp:    97.9 °F (36.6 °C)   SpO2:       Weight:       Height:            Physical Exam:  GENERAL: alert, cooperative, no distress  LUNG: clear to auscultation bilaterally  HEART: regular rate and rhythm, sinus bradycardia on the monitor, S1, S2 normal, no murmur, click, rub or gallop  EXTREMITIES:  extremities normal, atraumatic, no cyanosis or edema, distal pulses +1 bilaterally. SKIN: cool to touch. Appropriate for ethnicity. Right groin site, nontender to palpation, clean, dry, and intact. No hematoma or bleeding noted. Bruising noted around site. Neurologic Exam:  Mental Status:  Alert and oriented x 4. Appropriate affect, mood and behavior. Language:    Normal fluency, repetition, comprehension and naming. Cranial Nerves:        Pupils equal, round and reactive to light. Visual fields full to confrontation. Extraocular movements intact. Facial sensation intact. No facial asymmetry. No dysarthria. Tongue protrudes to midline, palate elevates symmetrically. Motor:    Strength in RUE 4+/5, no drift present in arms. Right leg drift present.  RLE 4/5 with leg extended and some weakness noted with dorsiflexion on right foot, otherwise 5/5 strength. LUE and LLE moves purposefully with 5/5 strength. Bulk and tone normal.      No involuntary movements. Sensation:     Decreased sensation in the right arm to light touch, otherwise sensation intact. No neglect. Coordination & Gait: No ataxia with finger-to-nose and heel-to-shin bilaterally. Gait deferred. 24 hour results:    Recent Results (from the past 24 hour(s))   SARS-COV-2    Collection Time: 10/19/21  3:50 PM   Result Value Ref Range    SARS-CoV-2 Please find results under separate order     SARS-COV-2    Collection Time: 10/19/21  3:50 PM   Result Value Ref Range    Specimen source Nasopharyngeal      SARS-CoV-2 Not detected NOTD        MRI Results (most recent):  Results from [place] encounter on 10/14/21    MRI BRAIN WO CONT    Narrative  *PRELIMINARY REPORT*    Previously seen acute infarcts in the left parietal lobe and posterior left  frontal lobe are slightly increased. Preliminary report was provided by Dr. Mona Orta, the on-call radiologist, on  10/20/2021 at 0045 hours. Final report to follow. *END PRELIMINARY REPORT*    FINAL REPORT:    EXAM: MRI BRAIN WO CONT    TECHNIQUE: Brain images including sagittal and axial T1-weighted, axial FLAIR,  T2-weighted, diffusion weighted, gradient echo,  susceptibility weighted,  coronal T2    IV CONTRAST:  None    INDICATION:  ongoing dizziness and visual changes in the setting of recent left  carotid stenting and hypotension, eval for posterior circulation stroke    COMPARISON:  Brain MRI of 10/14/2021    FINDINGS:  BRAIN PARENCHYMA:  1. Small foci of diffusion restriction in the left inferior parietal lobe are  more conspicuous and have increased minimally in size when compared to the  previous study of 5 days earlier. New tiny of diffusion restriction in the cortex of the left inferior frontal  gyrus. No acute major territorial infarct. Corresponding areas of FLAIR signal  abnormality noted.   2. Background of mild periventricular white matter signal abnormality, likely  due to small vessel disease. INTRACRANIAL HEMORRHAGE: None. CSF SPACES:  Normal in size and morphology for the patient's age. BASAL CISTERNS:  Patent. MIDLINE SHIFT: None. VASCULAR SYSTEM:  Normal flow voids. PARANASAL SINUSES AND MASTOID AIR CELLS:  No significant opacification. VISUALIZED ORBITS:  No significant abnormalities. Bilateral lens replacements. VISUALIZED UPPER CERVICAL SPINE:  No significant abnormalities. SELLA:  Unchanged well-defined FLAIR/T2 hyperintense focus in the left side of  the sella, likely representing a proteinaceous cyst. No suprasellar extension or  mass effect on the chiasm. SKULL BASE:  No significant abnormalities. Cerebellar tonsils in normal  position. CALVARIUM:  Intact. Impression  1. Minimal increase in size of previously demonstrated left inferior parietal  infarct. New tiny left frontal cortical infarct. 2. Otherwise unchanged. Background of cerebral volume loss and mild white matter  signal abnormality. 3. Pituitary cystic lesion again noted. No pituitary gland enlargement. Imaging:  CT of head on 10/14/2021 at 0821 shows  IMPRESSION  No acute process or change compared to the prior exam.    CTA of head and neck on 10/14/2021 at 0905 shows     IMPRESSION  1. Severe 80-90% stenosis left proximal internal carotid artery. See above. 2. Mild/moderate stenosis origin left common carotid artery. 3. Approximately 20% stenosis proximal right internal carotid artery. 4. No definitive intraluminal filling defect or other CTA findings of large  vessel intracranial occlusion. 5. Asymmetric decreased size left MCA may be related to flow limitation from the  internal carotid artery stenosis. CT perfusion on 10/14/2021 at 0904 shows  IMPRESSION  Possible artifact left temporal lobe. Correlate clinically. Otherwise no  flow/perfusion abnormality demonstrated.     MRI of brain on 10/14/2021 at 1153 shows  IMPRESSION  1. Findings consistent with a degree of acute ischemic injury left parietal  lobe. See above descriptions. 2. Left sella/pituitary cystic mass. Incompletely evaluated. Depending on  clinical circumstance consider follow-up MRI with contrast and with attention to  the pituitary. CT of head on 10/16/2021 at 1449 shows  IMPRESSION  1. Left parietal lobe infarct seen on prior MRI are not conspicuous on current  CT. 2.  Stable cystic lesion in the left aspect pituitary/sella.  Again follow-up  with contrast enhanced pituitary protocol MRI can be obtained as an outpatient  nonemergently for further characterization       CT Results  (Last 48 hours)    None        Assessment:     Active Problems:    CVA (cerebral vascular accident) ([facility] [address] (10/14/2021)      Orthostatic hypotension (10/18/2021)        Plan:   Left ischemic infarct in the setting of severe L ICA Stenosis  New tiny left frontal cortical infarct on MRI brain on 10/20/2021    · s/p cerebral angiogram and left carotid artery stenting on 10/15/2021  · lipid panel shows LDL 77.2, goal <70 continue Zetia 10 mg daily, can resume  · Repatha outpatient as patient was on this previously per Neurology (patient reports she has had nausea with statins)  · continue Aspirin 81 mg daily and Brilinta 45 mg BID (patient therapeutic on aspirin and brilinta)  · Hgb A1C ok at 5.6  · SBP goal <140 s/p carotid stenting  · PT/OT/SLP evals  · Echo pending  · Abnormal TSH and T3; Discussed with hospitalist     Intractable Headache (improved)  · Continue Tylenol, Fioricet, Reglan PRN    Left Sella/Pituitary Cystic Mass  · as seen on imaging  · can follow up with Neurosurgery outpatient for further discussion and work-up (discussed with Dr. Edmund Quezada (neurosurgery on call)    Orthostatic hypotension s/p carotid stenting  · On midodrine to 10 mg TID   · started maintenance IV fluids LR at 75 ml/hr for hydration, encouraged PO intake and fluids for hydration/nutritition (Hospitalist consulted nutrition services)    Dizziness   · On Meclizine 12.5 mg PO every 6 hours as needed  · Echo pending  · Orthostatic vital signs  · Noted mild CATALINO which discussed with hospitalist       Activity: Up with assistance   DVT ppx: SCDs   Dispo: TBD    Plan d/w Dr. Arturo Miller, Dr. Debbi Cherry (Hospitalist) RN, neurology Dr. Sarah Perez, and patient.        Audie Boeck, NP

## 2021-10-20 NOTE — PROGRESS NOTES
Problem: Mobility Impaired (Adult and Pediatric)  Goal: *Acute Goals and Plan of Care (Insert Text)  Description: Note: FUNCTIONAL STATUS PRIOR TO ADMISSION: Patient was modified independent using a single point cane for functional mobility. HOME SUPPORT PRIOR TO ADMISSION: The patient lived alone with children to provide assistance. Physical Therapy Goals  Initiated 10/17/2021  1. Patient will move from supine to sit and sit to supine  in bed with modified independence within 7 day(s). 2.  Patient will transfer from bed to chair and chair to bed with modified independence using the least restrictive device within 7 day(s). 3.  Patient will ambulate with modified independence for 300 feet with the least restrictive device within 7 day(s). 4.  Patient will ascend/descend 12 stairs with 1 handrail(s) with modified independence within 7 day(s). 10/20/2021 1248 by Norberto Watkins PT  Outcome: Progressing Towards Goal      PHYSICAL THERAPY TREATMENT  Patient: Bruce Mcclure (7 y.o. female)  Date: 10/20/2021  Diagnosis: CVA (cerebral vascular accident) [facility]) [I63.9] <principal problem not specified>       Precautions:    Chart, physical therapy assessment, plan of care and goals were reviewed. ASSESSMENT  Patient continues with skilled PT services and is progressing towards goals. Pt received supine in bed and agreeable to therapy. Pt tolerated session well, but continues to be limited by R sided weakness, decreased functional mobility, impaired balance and gait, decreased safety awareness, decreased insight into deficits, increased risk for recurrent falls. Pt tolerated transfers with RW with min A and max verbal cueing for proper hand placement. Pt completed gait training x 90 ft with RW with min A and noted decreased R foot clearance during swing phase. Pt will continue to benefit from PT to progress mobility as tolerated and reach highest level of independence.  Recommend inpatient rehab upon discharge to continue therapy efforts and reduce risk for falls. Current Level of Function Impacting Discharge (mobility/balance): min A sit<>stand, gait training x 90 ft with RW with min A    Other factors to consider for discharge: history of multiple falls         PLAN :  Patient continues to benefit from skilled intervention to address the above impairments. Continue treatment per established plan of care. to address goals. Recommendation for discharge: (in order for the patient to meet his/her long term goals)  Therapy 3 hours per day 5-7 days per week    This discharge recommendation:  Has been made in collaboration with the attending provider and/or case management    IF patient discharges home will need the following DME: to be determined (TBD)       SUBJECTIVE:   Patient stated I have fallen a bunch. Probably once a week.     OBJECTIVE DATA SUMMARY:   Critical Behavior:  Neurologic State: Alert, Eyes open spontaneously  Orientation Level: Oriented X4  Cognition: Follows commands, Appropriate safety awareness, Appropriate decision making  Safety/Judgement: Awareness of environment  Functional Mobility Training:  Bed Mobility:     Supine to Sit: Stand-by assistance;Bed Modified; Adaptive equipment     Scooting: Stand-by assistance        Transfers:  Sit to Stand: Contact guard assistance  Stand to Sit: Minimum assistance (decreased eccentric control)                             Balance:  Sitting: Intact; With support  Standing: Impaired; With support  Standing - Static: Fair  Standing - Dynamic : Constant support; Fair  Ambulation/Gait Training:  Distance (ft): 90 Feet (ft)  Assistive Device: Gait belt;Walker, rolling  Ambulation - Level of Assistance: Minimal assistance        Gait Abnormalities: Decreased step clearance;Trunk sway increased; Shuffling gait        Base of Support: Narrowed; Center of gravity altered     Speed/Anusha: Pace decreased (<100 feet/min); Slow  Step Length: Right shortened;Left shortened                   Therapeutic Exercises:     Pain Rating:  Pt denied pain    Activity Tolerance:   Good    After treatment patient left in no apparent distress:   Sitting in chair, Call bell within reach, Bed / chair alarm activated, and Side rails x 3    COMMUNICATION/COLLABORATION:   The patients plan of care was discussed with: Occupational therapist and Registered nurse.      Garcia Kilpatrick, PT, DPT   Time Calculation: 23 mins

## 2021-10-20 NOTE — PROGRESS NOTES
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 She is s/p cerebral angiogram and left sided carotid artery stenting on 10/15/21 with Dr. Shayy Landaverde. Rounded on patient in NSTU. She is doing well with intermittent left frontal headaches and intermittent hypotention  . With stated right hand weakness but not noted on my exam.   Physical Exam:  Gen: NAD, calm, cooperative  Neuro: A&Ox4. Follows commands. Speech clear. Affect normal. PERRL, 3 mm bilaterally. Blinks to threat. No disconjugate gaze present. EOMI. Face symmetric. Palate symmetric. Tongue midline. Jessy spontaneously. Strength 5/5 in UE and LE BL. Negative drift. Bulk and tone normal. No involuntary movements. Gait deferred. Skin: Warm, dry, color appropriate for ethnicity. Continue current plan per NIS. Scheduled for repeat MRI to rule out posterior circulation stroke/ posterior ischemia related to hypotension.      Sarahi Lord NP  Neurocritical Care Nurse Practitioner  [phone]

## 2021-10-21 PROCEDURE — 74011250637 HC RX REV CODE- 250/637: Performed by: NURSE PRACTITIONER

## 2021-10-21 PROCEDURE — 97112 NEUROMUSCULAR REEDUCATION: CPT

## 2021-10-21 PROCEDURE — 74011250637 HC RX REV CODE- 250/637: Performed by: RADIOLOGY

## 2021-10-21 PROCEDURE — 97535 SELF CARE MNGMENT TRAINING: CPT

## 2021-10-21 PROCEDURE — 65660000000 HC RM CCU STEPDOWN

## 2021-10-21 PROCEDURE — 92507 TX SP LANG VOICE COMM INDIV: CPT

## 2021-10-21 RX ADMIN — TICAGRELOR 45 MG: 90 TABLET ORAL at 21:14

## 2021-10-21 RX ADMIN — ASPIRIN 81 MG CHEWABLE TABLET 81 MG: 81 TABLET CHEWABLE at 10:29

## 2021-10-21 RX ADMIN — PANTOPRAZOLE SODIUM 40 MG: 40 TABLET, DELAYED RELEASE ORAL at 07:24

## 2021-10-21 RX ADMIN — DULOXETINE HYDROCHLORIDE 20 MG: 20 CAPSULE, DELAYED RELEASE ORAL at 10:32

## 2021-10-21 RX ADMIN — EZETIMIBE 10 MG: 10 TABLET ORAL at 10:29

## 2021-10-21 RX ADMIN — TICAGRELOR 45 MG: 90 TABLET ORAL at 10:28

## 2021-10-21 RX ADMIN — BUPROPION HYDROCHLORIDE 150 MG: 150 TABLET, EXTENDED RELEASE ORAL at 10:29

## 2021-10-21 RX ADMIN — MIDODRINE HYDROCHLORIDE 5 MG: 5 TABLET ORAL at 12:37

## 2021-10-21 NOTE — PROGRESS NOTES
Problem: Self Care Deficits Care Plan (Adult)  Goal: *Acute Goals and Plan of Care (Insert Text)  Description:   FUNCTIONAL STATUS PRIOR TO ADMISSION: Patient was modified independent using a single point cane for functional mobility. However, noted in H&P/reported by patient, had an extensive fall history prior to admission, must climb stairs to access her bedroom/bathroom. HOME SUPPORT: The patient lived alone with children who live locally to provide assistance. Patient reporting option to return to daughter's home with first floor set-up if needed. Occupational Therapy Goals  Initiated 10/18/2021  1. Patient will perform bathing with supervision/set-up within 7 day(s). 2.  Patient will perform upper body dressing with supervision/set-up within 7 day(s). 3.  Patient will perform lower body dressing with supervision/set-up within 7 day(s). 4.  Patient will perform toilet transfers with independence within 7 day(s). 5.  Patient will perform all aspects of toileting with independence within 7 day(s). 6.  Patient will perform grooming tasks standing at sink with supervision within 7 day(s). 7.  Patient will utilize energy conservation techniques during functional activities with verbal cues within 7 day(s). Outcome: Progressing Towards Goal    OCCUPATIONAL THERAPY TREATMENT  Patient: Mona Gomez (1 y.o. female)  Date: 10/21/2021  Diagnosis: CVA (cerebral vascular accident) [facility]) [I63.9] <principal problem not specified>       Precautions:  fall  Chart, occupational therapy assessment, plan of care, and goals were reviewed. ASSESSMENT  Patient continues with skilled OT services and is progressing towards goals but remains limited by impaired gait/ R foot clearance, impaired balance, impaired R forearm/ hand sensation (diminished to LT), and impaired RUE fine motor coordination.   Practiced standing grooming at sink and required cues for RW positioning, required up to minimum assistance ambulating ~50' with RW, then practiced functional tasks seated targeting RUE coordination recovery. Patient receptive to education on neuroplasticity/ importance of functional and repetitive task practice. Good LB reach donning/ doffing socks in tailor sit and practiced standing with simulated pants raise with minimum assistance. Patient endorses extensive fall history and is at increased risk for additional falls. She would benefit from inpatient rehab to maximize functional recovery. Current Level of Function Impacting Discharge (ADLs): up to minimum assistance     Orthostatics taken. Patient reported dizziness/lightheadedness throughout session, not increasing with mobility. Vitals:    10/21/21 1428 10/21/21 1431 10/21/21 1437   BP: 134/60 (!) 118/53 (!) 118/54   BP 1 Location: Right upper arm Right upper arm Right upper arm   BP Patient Position: Supine Sitting Standing after walking to/ from sink   Pulse: 61 65 71             PLAN :  Patient continues to benefit from skilled intervention to address the above impairments. Continue treatment per established plan of care to address goals. Recommendation for discharge: (in order for the patient to meet his/her long term goals)  Therapy 3 hours per day 5-7 days per week    This discharge recommendation:  Has been made in collaboration with the attending provider and/or case management         SUBJECTIVE:   Patient stated My right hand isn't working right.     OBJECTIVE DATA SUMMARY:     Functional Mobility and Transfers for ADLs:  Bed Mobility:  Supine to Sit: Moderate assistance (with HOB flat, to R)    Transfers:  Sit to Stand: Contact guard assistance          Balance:  Sitting: Intact  Standing: Impaired  Standing - Static: Good  Standing - Dynamic : Fair    ADL Intervention:       Grooming  Washing Face: Contact guard assistance (standing at sink)  Brushing Teeth: Contact guard assistance (standing at sink, cues for RW positioning) Pain:  Patient did not report pain    Activity Tolerance:   VSS, good    After treatment patient left in no apparent distress:   Sitting in chair, Call bell within reach, and Bed / chair alarm activated    COMMUNICATION/COLLABORATION:   The patients plan of care was discussed with: Physical therapist, Registered nurse, and Case management. Patient was educated regarding Her deficit(s) of impaired balance and R incoordination as this relates to Her diagnosis of CVA. She demonstrated Good understanding as evidenced by verbal respose. Patient and/or family was verbally educated on the BE FAST acronym for signs/symptoms of CVA and TIA. BE FAST was written on patient's communication board  for visual education and reinforcement. All questions answered with patient indicating good understanding.      Taylor Willis OT  Time Calculation: 30 mins

## 2021-10-21 NOTE — PROGRESS NOTES
Bedside and Verbal shift change report given to Benjamín Nevarez (oncoming nurse) by Rosario Pallas (offgoing nurse). Report included the following information SBAR, Kardex, MAR, Cardiac Rhythm NSR  and Dual Neuro Assessment.

## 2021-10-21 NOTE — PROGRESS NOTES
Problem: Pain  Goal: *Control of Pain  Outcome: Progressing Towards Goal  Goal: *PALLIATIVE CARE:  Alleviation of Pain  Outcome: Progressing Towards Goal     Problem: Patient Education: Go to Patient Education Activity  Goal: Patient/Family Education  Outcome: Progressing Towards Goal     Problem: Patient Education: Go to Patient Education Activity  Goal: Patient/Family Education  Outcome: Progressing Towards Goal     Problem: TIA/CVA Stroke: Day 2 Until Discharge  Goal: Off Pathway (Use only if patient is Off Pathway)  Outcome: Progressing Towards Goal  Goal: Activity/Safety  Outcome: Progressing Towards Goal  Goal: Diagnostic Test/Procedures  Outcome: Progressing Towards Goal  Goal: Nutrition/Diet  Outcome: Progressing Towards Goal  Goal: Discharge Planning  Outcome: Progressing Towards Goal  Goal: Medications  Outcome: Progressing Towards Goal  Goal: Respiratory  Outcome: Progressing Towards Goal  Goal: Treatments/Interventions/Procedures  Outcome: Progressing Towards Goal  Goal: Psychosocial  Outcome: Progressing Towards Goal  Goal: *Verbalizes anxiety and depression are reduced or absent  Outcome: Progressing Towards Goal  Goal: *Absence of aspiration  Outcome: Progressing Towards Goal  Goal: *Absence of deep venous thrombosis signs and symptoms(Stroke Metric)  Outcome: Progressing Towards Goal  Goal: *Optimal pain control at patient's stated goal  Outcome: Progressing Towards Goal  Goal: *Tolerating diet  Outcome: Progressing Towards Goal  Goal: *Ability to perform ADLs and demonstrates progressive mobility and function  Outcome: Progressing Towards Goal  Goal: *Stroke education continued(Stroke Metric)  Outcome: Progressing Towards Goal     Problem: Ischemic Stroke: Discharge Outcomes  Goal: *Verbalizes anxiety and depression are reduced or absent  Outcome: Progressing Towards Goal  Goal: *Verbalize understanding of risk factor modification(Stroke Metric)  Outcome: Progressing Towards Goal  Goal: *Hemodynamically stable  Outcome: Progressing Towards Goal  Goal: *Absence of aspiration pneumonia  Outcome: Progressing Towards Goal  Goal: *Aware of needed dietary changes  Outcome: Progressing Towards Goal  Goal: *Verbalize understanding of prescribed medications including anti-coagulants, anti-lipid, and/or anti-platelets(Stroke Metric)  Outcome: Progressing Towards Goal  Goal: *Tolerating diet  Outcome: Progressing Towards Goal  Goal: *Aware of follow-up diagnostics related to anticoagulants  Outcome: Progressing Towards Goal  Goal: *Ability to perform ADLs and demonstrates progressive mobility and function  Outcome: Progressing Towards Goal  Goal: *Absence of DVT(Stroke Metric)  Outcome: Progressing Towards Goal  Goal: *Absence of aspiration  Outcome: Progressing Towards Goal  Goal: *Optimal pain control at patient's stated goal  Outcome: Progressing Towards Goal  Goal: *Home safety concerns addressed  Outcome: Progressing Towards Goal  Goal: *Describes available resources and support systems  Outcome: Progressing Towards Goal  Goal: *Verbalizes understanding of activation of EMS(911) for stroke symptoms(Stroke Metric)  Outcome: Progressing Towards Goal  Goal: *Understands and describes signs and symptoms to report to providers(Stroke Metric)  Outcome: Progressing Towards Goal  Goal: *Neurolgocially stable (absence of additional neurological deficits)  Outcome: Progressing Towards Goal  Goal: *Verbalizes importance of follow-up with primary care physician(Stroke Metric)  Outcome: Progressing Towards Goal  Goal: *Smoking cessation discussed,if applicable(Stroke Metric)  Outcome: Progressing Towards Goal  Goal: *Depression screening completed(Stroke Metric)  Outcome: Progressing Towards Goal     Problem: Falls - Risk of  Goal: *Absence of Falls  Description: Document Lobo Fall Risk and appropriate interventions in the flowsheet.   Outcome: Progressing Towards Goal  Note: Fall Risk Interventions:  Mobility Interventions: Communicate number of staff needed for ambulation/transfer, OT consult for ADLs, Patient to call before getting OOB, Strengthening exercises (ROM-active/passive), PT Consult for mobility concerns         Medication Interventions: Patient to call before getting OOB, Teach patient to arise slowly    Elimination Interventions: Call light in reach, Bed/chair exit alarm, Toileting schedule/hourly rounds, Stay With Me (per policy)    History of Falls Interventions: Room close to nurse's station, Bed/chair exit alarm         Problem: Patient Education: Go to Patient Education Activity  Goal: Patient/Family Education  Outcome: Progressing Towards Goal     Problem: Pressure Injury - Risk of  Goal: *Prevention of pressure injury  Description: Document Robe Scale and appropriate interventions in the flowsheet. Outcome: Progressing Towards Goal  Note: Pressure Injury Interventions:  Sensory Interventions: Minimize linen layers    Moisture Interventions: Minimize layers, Limit adult briefs, Assess need for specialty bed, Apply protective barrier, creams and emollients    Activity Interventions: Pressure redistribution bed/mattress(bed type), PT/OT evaluation, Increase time out of bed    Mobility Interventions: Assess need for specialty bed, Turn and reposition approx.  every two hours(pillow and wedges), Pressure redistribution bed/mattress (bed type)    Nutrition Interventions: Document food/fluid/supplement intake, Offer support with meals,snacks and hydration                     Problem: Patient Education: Go to Patient Education Activity  Goal: Patient/Family Education  Outcome: Progressing Towards Goal

## 2021-10-21 NOTE — PROGRESS NOTES
Hospitalist Progress Note  Indiana Benitez DO  Answering service: [phone] from in house phone      Date of Service:  10/21/2021  NAME:  Marco Hidalgo  :  1940  MRN:  [id number]    Admission Summary:   81F p/w L- CVA. Had L ICA stent placed, was in ICU. Downgrading to floor again. Interval history / Subjective: Follow up CVA. Patient seen and examined this AM. Has some intermittent dizziness. Echo is pending. Assessment & Plan:     CVA, ischemic left parietal:   Severe left ICA stenosis:   - CTA head/neck- Severe 80-90% stenosis L- proximal internal carotid artery   - MRI L parietal lobe ischemic CVA. 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 Repeat CT head stable. - Neurology following - SLP/PT/OT  - Atorvastatin 80mg   - Aspirin and Plavix - A1c 5.6, LDL 77  - dizziness/nausea/orthostatic hypotension (resolved)- continues on IV fluids, midodrine, echo pending  -- orthostatic blood pressure negative 10/21  - repeat MRI brain: slightly increased size of prev L parietal CVA, new small infarct in L frontal - reviewed by neuroIR and unchanged    Hypertension:- Hydralazine PRN. monitor  Elevated creatinine: mild. IVF. Encourage po intake. GERD - Continue PPI  Depression - Supportive treatment - Continue home dose wellbutrin    Elevated TSH: mild, T4 normal     Code status: Partial.  DVT prophylaxis: SCDs  Care Plan discussed with: Patient/Family and Nurse  Disposition: [address] Problems  Date Reviewed: 10/22/2018        Codes Class Noted POA    Orthostatic hypotension ICD-10-CM: I95.1  ICD-9-CM: 458.0  10/18/2021 No        CVA (cerebral vascular accident) [facility]) ICD-10-CM: I63.9  ICD-9-CM: 434.91  10/14/2021 Yes            Review of Systems:   Pertinent items are mentioned in interval history.     Vital Signs:    Last 24hrs VS reviewed since prior progress note. Most recent are:  Visit Vitals  BP (!) 118/54 (BP 1 Location: Right upper arm, BP Patient Position: Standing)   Pulse 71   Temp 98.5 °F (36.9 °C)   Resp 20   Ht 5' 4\" (1.626 m)   Wt 76 kg (167 lb 8.8 oz)   SpO2 97%   BMI 28.76 kg/m²       No intake or output data in the 24 hours ending 10/21/21 1550     Physical Examination:   Evaluated face to face and examined 10/21/21    General:  Alert, oriented, No acute distress. Pleasant elderly, obese [employer]  Resp:  No accessory muscle use, Good AE, no wheezes. no crepitations  Abd:  Soft, non-tender, non-distended, BS+  Extremities:  No cyanosis or clubbing, no significant edema  Neuro:  Grossly normal, no focal neuro deficits, follows commands, diminished  strength right upper extremity   Psych:  Good insight, not agitated. Data Review:    Review and/or order of clinical lab test  Review and/or order of tests in the radiology section of CPT  Review and/or order of tests in the medicine section of CPT  Labs:     Recent Labs     10/19/21  0203   WBC 10.2   HGB 10.2*   HCT 31.5*        Recent Labs     10/19/21  0203      K 3.8   *   CO2 24   BUN 14   CREA 1.22*   GLU 88   CA 8.7     No results for input(s): ALT, AP, TBIL, TBILI, TP, ALB, GLOB, GGT, AML, LPSE in the last 72 hours. No lab exists for component: SGOT, GPT, AMYP, HLPSE  No results for input(s): INR, PTP, APTT, INREXT, INREXT in the last 72 hours. No results for input(s): FE, TIBC, PSAT, FERR in the last 72 hours. No results found for: FOL, RBCF   No results for input(s): PH, PCO2, PO2 in the last 72 hours. No results for input(s): CPK, CKNDX, TROIQ in the last 72 hours.     No lab exists for component: CPKMB  Lab Results   Component Value Date/Time    Cholesterol, total 180 10/15/2021 03:15 AM    HDL Cholesterol 85 10/15/2021 03:15 AM    LDL, calculated 77.2 10/15/2021 03:15 AM    Triglyceride 89 10/15/2021 03:15 AM    CHOL/HDL Ratio 2.1 10/15/2021 03:15 AM     No results found for: [facility]  Lab Results   Component Value Date/Time    Color YELLOW/STRAW 10/20/2021 07:40 PM    Appearance CLEAR 10/20/2021 07:40 PM    Specific gravity 1.010 10/20/2021 07:40 PM    pH (UA) 7.5 10/20/2021 07:40 PM    Protein Negative 10/20/2021 07:40 PM    Glucose Negative 10/20/2021 07:40 PM    Ketone Negative 10/20/2021 07:40 PM    Bilirubin Negative 10/20/2021 07:40 PM    Urobilinogen 1.0 10/20/2021 07:40 PM    Nitrites Negative 10/20/2021 07:40 PM    Leukocyte Esterase TRACE (A) 10/20/2021 07:40 PM    Epithelial cells FEW 10/20/2021 07:40 PM    Bacteria Negative 10/20/2021 07:40 PM    WBC 0-4 10/20/2021 07:40 PM    RBC 0-5 10/20/2021 07:40 PM     Medications Reviewed:     Current Facility-Administered Medications   Medication Dose Route Frequency    midodrine (PROAMATINE) tablet 5 mg  5 mg Oral TID WITH MEALS    influenza vaccine 2021-22 (6 mos+)(PF) (FLUARIX/FLULAVAL/FLUZONE QUAD) injection 0.5 mL  1 Each IntraMUSCular PRIOR TO DISCHARGE    meclizine (ANTIVERT) tablet 12.5 mg  12.5 mg Oral Q6H PRN    lactated Ringers infusion  75 mL/hr IntraVENous CONTINUOUS    metoclopramide HCl (REGLAN) tablet 10 mg  10 mg Oral Q6H PRN    pantoprazole (PROTONIX) tablet 40 mg  40 mg Oral ACB    buPROPion SR (WELLBUTRIN SR) tablet 150 mg  150 mg Oral BID    DULoxetine (CYMBALTA) capsule 20 mg  20 mg Oral DAILY    ondansetron (ZOFRAN) injection 4 mg  4 mg IntraVENous Q6H PRN    butalbital-acetaminophen-caffeine (FIORICET, ESGIC) -40 mg per tablet 1 Tablet  1 Tablet Oral Q6H PRN    acetaminophen (TYLENOL) tablet 650 mg  650 mg Oral Q6H PRN    ticagrelor (BRILINTA) tablet 45 mg  45 mg Oral Q12H    ezetimibe (ZETIA) tablet 10 mg  10 mg Oral DAILY    magnesium hydroxide (MILK OF MAGNESIA) 400 mg/5 mL oral suspension 30 mL  30 mL Oral DAILY PRN    aspirin chewable tablet 81 mg  81 mg Oral DAILY   ______________________________________________________________________  EXPECTED LENGTH OF STAY: 2d 2h  ACTUAL LENGTH OF STAY:          [address] Girish Solorzano, DO

## 2021-10-21 NOTE — PROGRESS NOTES
Problem: Communication Impaired (Adult)  Goal: *Acute Goals and Plan of Care (Insert Text)  Description: Speech Therapy Goals  Initiated 10/18/2021  1. Patient will participate in semantic feature analysis with 90% accuracy within 7 days. Discontinue  Outcome: Resolved/Met     SPEECH LANGUAGE PATHOLOGY TREATMENT/DISCHARGE  Patient: Angélica Demarco (20 y.o. female)  Date: 10/21/2021  Diagnosis: [facility] [I63.9] <principal problem not specified>       Precautions:       ASSESSMENT:  Patient with improved language function, and she reported significantly improved function as well. Patient with one word-finding deficit in conversation, however she was able to state the word given mildly increased time. Provided re-education regarding word-retrieval strategies and patient verbalized understanding. PLAN:  Patient will be discharged from skilled acute speech therapy at this time.  -Cue patient to describe the word if she is having difficulty with word-finding  Rationale for discharge:  Goals achieved    Discharge Recommendations: To Be Determined     SUBJECTIVE:   Patient stated that her speech is much better. OBJECTIVE:   Mental Status:  Neurologic State: Alert, Eyes open spontaneously  Orientation Level: Oriented X4  Cognition: Appropriate decision making, Appropriate for age attention/concentration, Appropriate safety awareness, Follows commands  Perception: Appears intact  Perseveration: No perseveration noted  Safety/Judgement: Awareness of environment    NOMS:   The NOMS functional outcome measure was used to quantify this patient's level of expressive language impairment. Based on the NOMS, the patient was determined to be at level 7 for spoken language expression. NOMS Spoken Language Expression:  Level 1 (CN): Verbalizations not meaningful to anyone. Level 2 (CM): Few attempts accurate/appropriate. Max cues to elicit automatic/imitative words/phrases.   Level 3 (CL): Communication partner responsible for communication; Mod cues for words/phrases meaningful in context  Level 4 (CK): Initiate during simple, routine activities w/familiar partner. Mod cues to produce simple sentences  Level 5 (Hobert José): Initiates communication with familiar and unfamiliar partners. Min cues for complex sentences. Level 6 (CI): Communicates for most activities. Some limitations still present for vocational/social activities. Rarely cued for complex info  Level 7 [facility]): Independent communication. DERRELL. (2003). National Outcomes Measurement System (NOMS): Adult Speech-Language Pathology User's Guide. After treatment:   Patient left in no apparent distress in bed, Call bell within reach and Nursing notified    COMMUNICATION/EDUCATION:   Patient was educated regarding her deficit(s) of aphasia as this relates to her diagnosis of CVA. She demonstrated Good understanding as evidenced by verbalizing understanding. The patient's plan of care including recommendations, planned interventions, and recommended diet changes were discussed with: Registered nurse.      Katherin Chavez, SLP  Time Calculation: 15 mins

## 2021-10-22 ENCOUNTER — APPOINTMENT (OUTPATIENT)
Dept: NON INVASIVE DIAGNOSTICS | Age: 81
DRG: 035 | End: 2021-10-22
Attending: NURSE PRACTITIONER
Payer: MEDICARE

## 2021-10-22 LAB
ECHO AO ROOT DIAM: 2.75 CM
ECHO AV AREA PEAK VELOCITY: 2.17 CM2
ECHO AV AREA/BSA PEAK VELOCITY: 1.2 CM2/M2
ECHO AV PEAK GRADIENT: 5.88 MMHG
ECHO AV PEAK VELOCITY: 121.27 CM/S
ECHO EST RA PRESSURE: 5 MMHG
ECHO LA MAJOR AXIS: 3.66 CM
ECHO LA MINOR AXIS: 2.03 CM
ECHO LV INTERNAL DIMENSION DIASTOLIC: 4.24 CM (ref 3.9–5.3)
ECHO LV INTERNAL DIMENSION SYSTOLIC: 2.97 CM
ECHO LV IVSD: 1.19 CM (ref 0.6–0.9)
ECHO LV MASS 2D: 143.2 G (ref 67–162)
ECHO LV MASS INDEX 2D: 79.6 G/M2 (ref 43–95)
ECHO LV POSTERIOR WALL DIASTOLIC: 0.85 CM (ref 0.6–0.9)
ECHO LVOT DIAM: 1.89 CM
ECHO LVOT PEAK GRADIENT: 3.53 MMHG
ECHO LVOT PEAK VELOCITY: 93.98 CM/S
ECHO MV A VELOCITY: 96.06 CM/S
ECHO MV E DECELERATION TIME (DT): 206 MS
ECHO MV E VELOCITY: 92.05 CM/S
ECHO MV E/A RATIO: 0.96
ECHO PV PEAK INSTANTANEOUS GRADIENT SYSTOLIC: 1.61 MMHG
P2Y12 PLT RESPONSE,PPPR: 60 PRU (ref 194–418)

## 2021-10-22 PROCEDURE — 93306 TTE W/DOPPLER COMPLETE: CPT

## 2021-10-22 PROCEDURE — 85576 BLOOD PLATELET AGGREGATION: CPT

## 2021-10-22 PROCEDURE — 74011250637 HC RX REV CODE- 250/637: Performed by: NURSE PRACTITIONER

## 2021-10-22 PROCEDURE — 36415 COLL VENOUS BLD VENIPUNCTURE: CPT

## 2021-10-22 PROCEDURE — 65660000000 HC RM CCU STEPDOWN

## 2021-10-22 PROCEDURE — 97116 GAIT TRAINING THERAPY: CPT

## 2021-10-22 PROCEDURE — 93306 TTE W/DOPPLER COMPLETE: CPT | Performed by: SPECIALIST

## 2021-10-22 PROCEDURE — 74011250637 HC RX REV CODE- 250/637: Performed by: RADIOLOGY

## 2021-10-22 RX ADMIN — MIDODRINE HYDROCHLORIDE 5 MG: 5 TABLET ORAL at 08:18

## 2021-10-22 RX ADMIN — PANTOPRAZOLE SODIUM 40 MG: 40 TABLET, DELAYED RELEASE ORAL at 06:44

## 2021-10-22 RX ADMIN — TICAGRELOR 45 MG: 90 TABLET ORAL at 22:38

## 2021-10-22 RX ADMIN — ASPIRIN 81 MG CHEWABLE TABLET 81 MG: 81 TABLET CHEWABLE at 08:18

## 2021-10-22 RX ADMIN — TICAGRELOR 45 MG: 90 TABLET ORAL at 08:19

## 2021-10-22 RX ADMIN — DULOXETINE HYDROCHLORIDE 20 MG: 20 CAPSULE, DELAYED RELEASE ORAL at 08:18

## 2021-10-22 RX ADMIN — EZETIMIBE 10 MG: 10 TABLET ORAL at 08:18

## 2021-10-22 RX ADMIN — BUPROPION HYDROCHLORIDE 150 MG: 150 TABLET, EXTENDED RELEASE ORAL at 08:18

## 2021-10-22 NOTE — PROGRESS NOTES
Problem: Mobility Impaired (Adult and Pediatric)  Goal: *Acute Goals and Plan of Care (Insert Text)  Description: Note: FUNCTIONAL STATUS PRIOR TO ADMISSION: Patient was modified independent using a single point cane for functional mobility. HOME SUPPORT PRIOR TO ADMISSION: The patient lived alone with children to provide assistance. Physical Therapy Goals  Initiated 10/17/2021  1. Patient will move from supine to sit and sit to supine  in bed with modified independence within 7 day(s). 2.  Patient will transfer from bed to chair and chair to bed with modified independence using the least restrictive device within 7 day(s). 3.  Patient will ambulate with modified independence for 300 feet with the least restrictive device within 7 day(s). 4.  Patient will ascend/descend 12 stairs with 1 handrail(s) with modified independence within 7 day(s). Outcome: Progressing Towards Goal   PHYSICAL THERAPY TREATMENT  Patient: Angélica Demarco (46 y.o. female)  Date: 10/22/2021  Diagnosis: CVA (cerebral vascular accident) [facility]) [I63.9] <principal problem not specified>       Precautions:    Chart, physical therapy assessment, plan of care and goals were reviewed. ASSESSMENT  Patient continues with skilled PT services and is progressing towards goals. Pt received supine in bed and agreeable to therapy. Pt tolerated session well and making good progress towards goals. Pt tolerated session well, but continues to be limited by generalized weakness, R sided weakness, impaired R dorsiflexion, decreased functional mobility, impaired balance and gait, decreased tolerance to activity. Pt completed supine to sit with standby A. Pt performed sit<>stands with verbal cueing for safety to wait for therapist to provide RW and verbal cues for proper hand placement. Pt tolerated gait training with RW with min A. Noted decreased R foot clearance during gait that worsened as pt fatigued.  Pt will continue to benefit from inpatient rehab to continue therapy efforts, maximize functional outcomes and reduce risk for falls. .     Current Level of Function Impacting Discharge (mobility/balance): min A gait training with RW; decreased R dorsiflexion    Other factors to consider for discharge: high risk for recurrent falls         PLAN :  Patient continues to benefit from skilled intervention to address the above impairments. Continue treatment per established plan of care. to address goals. Recommendation for discharge: (in order for the patient to meet his/her long term goals)  Therapy 3 hours per day 5-7 days per week    This discharge recommendation:  Has been made in collaboration with the attending provider and/or case management    IF patient discharges home will need the following DME: to be determined (TBD)       SUBJECTIVE:   Patient stated My foot will get stuck or ill run into things. That's how I will fall.     OBJECTIVE DATA SUMMARY:   Critical Behavior:  Neurologic State: Alert, Eyes open spontaneously  Orientation Level: Oriented X4  Cognition: Follows commands, Poor safety awareness, Appropriate decision making, Appropriate for age attention/concentration  Safety/Judgement: Awareness of environment  Functional Mobility Training:  Bed Mobility:     Supine to Sit: Stand-by assistance     Scooting: Stand-by assistance        Transfers:  Sit to Stand: Contact guard assistance  Stand to Sit: Contact guard assistance                             Balance:  Sitting: Intact  Standing: Impaired  Standing - Static: Good  Standing - Dynamic : Fair;Constant support  Ambulation/Gait Training:  Distance (ft): 80 Feet (ft)  Assistive Device: Gait belt;Walker, rolling  Ambulation - Level of Assistance: Minimal assistance        Gait Abnormalities: Decreased step clearance;Trunk sway increased; Shuffling gait        Base of Support: Narrowed     Speed/Anusha: Pace decreased (<100 feet/min); Shuffled  Step Length: Right shortened;Left shortened  Swing Pattern: Right asymmetrical           Therapeutic Exercises:   Reaching for objects in standing with use of RW for UE support/balance and CGA  Pain Rating:  Pt denied pain    Activity Tolerance:   Fair    After treatment patient left in no apparent distress:   Sitting in chair, Call bell within reach, and Bed / chair alarm activated    COMMUNICATION/COLLABORATION:   The patients plan of care was discussed with: Occupational therapist and Registered nurse.      Barbara Felder, PT, DPT   Time Calculation: 14 mins

## 2021-10-22 NOTE — PROGRESS NOTES
Hospitalist Progress Note  Mary Ellen Maza DO  Answering service: [phone] from in house phone      Date of Service:  10/22/2021  NAME:  Miguel Zhang  :  1940  MRN:  [id number]    Admission Summary:   81F p/w L- CVA. Had L ICA stent placed, was in ICU. Downgrading to floor again. Interval history / Subjective: Follow up CVA. Patient seen and examined this AM. Still with intermittent dizziness. Echo unremarkable. Assessment & Plan:     CVA, ischemic left parietal:   Severe left ICA stenosis:   - CTA head/neck- Severe 80-90% stenosis L- proximal internal carotid artery   - MRI L parietal lobe ischemic CVA. 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 Repeat CT head stable. - Neurology following - SLP/PT/OT  - Atorvastatin 80mg   - Aspirin and Plavix - A1c 5.6, LDL 77  - dizziness/nausea/orthostatic hypotension (resolved)- s/p IV fluids, continue midodrine, echo unrevealing   -- orthostatic blood pressure negative 10/21  - repeat MRI brain: slightly increased size of prev L parietal CVA, new small infarct in L frontal - reviewed by neuroIR and unchanged    Hypertension:- Hydralazine PRN. monitor  Elevated creatinine: mild. IVF. Encourage po intake. GERD - Continue PPI  Depression - Supportive treatment - Continue home dose wellbutrin    Elevated TSH: mild, T4 normal     Code status: Partial.  DVT prophylaxis: SCDs  Care Plan discussed with: Patient/Family and Nurse  Disposition: IPR pending bed availability      Hospital Problems  Date Reviewed: 10/22/2018        Codes Class Noted POA    Orthostatic hypotension ICD-10-CM: I95.1  ICD-9-CM: 458.0  10/18/2021 No        CVA (cerebral vascular accident) [facility]) ICD-10-CM: I63.9  ICD-9-CM: 434.91  10/14/2021 Yes            Review of Systems:   Pertinent items are mentioned in interval history.     Vital Signs:    Last 24hrs VS reviewed since prior progress note. Most recent are:  Visit Vitals  BP (!) 131/55   Pulse 65   Temp 98.2 °F (36.8 °C)   Resp 14   Ht 5' 4\" (1.626 m)   Wt 74.4 kg (164 lb)   SpO2 92%   BMI 28.15 kg/m²       No intake or output data in the 24 hours ending 10/22/21 1628     Physical Examination:   Evaluated face to face and examined 10/22/21    General:  Alert, oriented, No acute distress. Pleasant elderly, obese [employer]  Resp:  No accessory muscle use, Good AE, no wheezes. no crepitations  Abd:  Soft, non-tender, non-distended, BS+  Extremities:  No cyanosis or clubbing, no significant edema  Neuro:  Grossly normal, no focal neuro deficits, follows commands, diminished  strength right upper extremity   Psych:  Good insight, not agitated. Data Review:    Review and/or order of clinical lab test  Review and/or order of tests in the radiology section of CPT  Review and/or order of tests in the medicine section of CPT  Labs:     No results for input(s): WBC, HGB, HCT, PLT, HGBEXT, HCTEXT, PLTEXT, HGBEXT, HCTEXT, PLTEXT in the last 72 hours. No results for input(s): NA, K, CL, CO2, BUN, CREA, GLU, CA, MG, PHOS, URICA in the last 72 hours. No results for input(s): ALT, AP, TBIL, TBILI, TP, ALB, GLOB, GGT, AML, LPSE in the last 72 hours. No lab exists for component: SGOT, GPT, AMYP, HLPSE  No results for input(s): INR, PTP, APTT, INREXT, INREXT in the last 72 hours. No results for input(s): FE, TIBC, PSAT, FERR in the last 72 hours. No results found for: FOL, RBCF   No results for input(s): PH, PCO2, PO2 in the last 72 hours. No results for input(s): CPK, CKNDX, TROIQ in the last 72 hours.     No lab exists for component: CPKMB  Lab Results   Component Value Date/Time    Cholesterol, total 180 10/15/2021 03:15 AM    HDL Cholesterol 85 10/15/2021 03:15 AM    LDL, calculated 77.2 10/15/2021 03:15 AM    Triglyceride 89 10/15/2021 03:15 AM    CHOL/HDL Ratio 2.1 10/15/2021 03:15 AM     No results found for: [facility]  Lab Results   Component Value Date/Time    Color YELLOW/STRAW 10/20/2021 07:40 PM    Appearance CLEAR 10/20/2021 07:40 PM    Specific gravity 1.010 10/20/2021 07:40 PM    pH (UA) 7.5 10/20/2021 07:40 PM    Protein Negative 10/20/2021 07:40 PM    Glucose Negative 10/20/2021 07:40 PM    Ketone Negative 10/20/2021 07:40 PM    Bilirubin Negative 10/20/2021 07:40 PM    Urobilinogen 1.0 10/20/2021 07:40 PM    Nitrites Negative 10/20/2021 07:40 PM    Leukocyte Esterase TRACE (A) 10/20/2021 07:40 PM    Epithelial cells FEW 10/20/2021 07:40 PM    Bacteria Negative 10/20/2021 07:40 PM    WBC 0-4 10/20/2021 07:40 PM    RBC 0-5 10/20/2021 07:40 PM     Medications Reviewed:     Current Facility-Administered Medications   Medication Dose Route Frequency    midodrine (PROAMATINE) tablet 5 mg  5 mg Oral TID WITH MEALS    influenza vaccine 2021-22 (6 mos+)(PF) (FLUARIX/FLULAVAL/FLUZONE QUAD) injection 0.5 mL  1 Each IntraMUSCular PRIOR TO DISCHARGE    meclizine (ANTIVERT) tablet 12.5 mg  12.5 mg Oral Q6H PRN    lactated Ringers infusion  75 mL/hr IntraVENous CONTINUOUS    metoclopramide HCl (REGLAN) tablet 10 mg  10 mg Oral Q6H PRN    pantoprazole (PROTONIX) tablet 40 mg  40 mg Oral ACB    buPROPion SR (WELLBUTRIN SR) tablet 150 mg  150 mg Oral BID    DULoxetine (CYMBALTA) capsule 20 mg  20 mg Oral DAILY    ondansetron (ZOFRAN) injection 4 mg  4 mg IntraVENous Q6H PRN    butalbital-acetaminophen-caffeine (FIORICET, ESGIC) -40 mg per tablet 1 Tablet  1 Tablet Oral Q6H PRN    acetaminophen (TYLENOL) tablet 650 mg  650 mg Oral Q6H PRN    ticagrelor (BRILINTA) tablet 45 mg  45 mg Oral Q12H    ezetimibe (ZETIA) tablet 10 mg  10 mg Oral DAILY    magnesium hydroxide (MILK OF MAGNESIA) 400 mg/5 mL oral suspension 30 mL  30 mL Oral DAILY PRN    aspirin chewable tablet 81 mg  81 mg Oral DAILY   ______________________________________________________________________  EXPECTED LENGTH OF STAY: 2d 2h  ACTUAL LENGTH OF STAY:          [address] [facility]

## 2021-10-22 NOTE — PROGRESS NOTES
Problem: Pain  Goal: *Control of Pain  Outcome: Progressing Towards Goal  Goal: *PALLIATIVE CARE:  Alleviation of Pain  Outcome: Progressing Towards Goal     Problem: Patient Education: Go to Patient Education Activity  Goal: Patient/Family Education  Outcome: Progressing Towards Goal     Problem: Patient Education: Go to Patient Education Activity  Goal: Patient/Family Education  Outcome: Progressing Towards Goal     Problem: TIA/CVA Stroke: 0-24 hours  Goal: Off Pathway (Use only if patient is Off Pathway)  Outcome: Progressing Towards Goal  Goal: Activity/Safety  Outcome: Progressing Towards Goal  Goal: Consults, if ordered  Outcome: Progressing Towards Goal  Goal: Diagnostic Test/Procedures  Outcome: Progressing Towards Goal  Goal: Nutrition/Diet  Outcome: Progressing Towards Goal  Goal: Discharge Planning  Outcome: Progressing Towards Goal  Goal: Medications  Outcome: Progressing Towards Goal  Goal: Respiratory  Outcome: Progressing Towards Goal  Goal: Treatments/Interventions/Procedures  Outcome: Progressing Towards Goal  Goal: Minimize risk of bleeding post-thrombolytic infusion  Outcome: Progressing Towards Goal  Goal: Monitor for complications post-thrombolytic infusion  Outcome: Progressing Towards Goal  Goal: Psychosocial  Outcome: Progressing Towards Goal  Goal: *Hemodynamically stable  Outcome: Progressing Towards Goal  Goal: *Neurologically stable  Description: Absence of additional neurological deficits    Outcome: Progressing Towards Goal  Goal: *Verbalizes anxiety and depression are reduced or absent  Outcome: Progressing Towards Goal  Goal: *Absence of Signs of Aspiration on Current Diet  Outcome: Progressing Towards Goal  Goal: *Absence of deep venous thrombosis signs and symptoms(Stroke Metric)  Outcome: Progressing Towards Goal  Goal: *Ability to perform ADLs and demonstrates progressive mobility and function  Outcome: Progressing Towards Goal  Goal: *Stroke education started(Stroke Metric)  Outcome: Progressing Towards Goal  Goal: *Dysphagia screen performed(Stroke Metric)  Outcome: Progressing Towards Goal  Goal: *Rehab consulted(Stroke Metric)  Outcome: Progressing Towards Goal     Problem: TIA/CVA Stroke: Day 2 Until Discharge  Goal: Off Pathway (Use only if patient is Off Pathway)  Outcome: Progressing Towards Goal  Goal: Activity/Safety  Outcome: Progressing Towards Goal  Goal: Diagnostic Test/Procedures  Outcome: Progressing Towards Goal  Goal: Nutrition/Diet  Outcome: Progressing Towards Goal  Goal: Discharge Planning  Outcome: Progressing Towards Goal  Goal: Medications  Outcome: Progressing Towards Goal  Goal: Respiratory  Outcome: Progressing Towards Goal  Goal: Treatments/Interventions/Procedures  Outcome: Progressing Towards Goal  Goal: Psychosocial  Outcome: Progressing Towards Goal  Goal: *Verbalizes anxiety and depression are reduced or absent  Outcome: Progressing Towards Goal  Goal: *Absence of aspiration  Outcome: Progressing Towards Goal  Goal: *Absence of deep venous thrombosis signs and symptoms(Stroke Metric)  Outcome: Progressing Towards Goal  Goal: *Optimal pain control at patient's stated goal  Outcome: Progressing Towards Goal  Goal: *Tolerating diet  Outcome: Progressing Towards Goal  Goal: *Ability to perform ADLs and demonstrates progressive mobility and function  Outcome: Progressing Towards Goal  Goal: *Stroke education continued(Stroke Metric)  Outcome: Progressing Towards Goal     Problem: Ischemic Stroke: Discharge Outcomes  Goal: *Verbalizes anxiety and depression are reduced or absent  Outcome: Progressing Towards Goal  Goal: *Verbalize understanding of risk factor modification(Stroke Metric)  Outcome: Progressing Towards Goal  Goal: *Hemodynamically stable  Outcome: Progressing Towards Goal  Goal: *Absence of aspiration pneumonia  Outcome: Progressing Towards Goal  Goal: *Aware of needed dietary changes  Outcome: Progressing Towards Goal  Goal: *Verbalize understanding of prescribed medications including anti-coagulants, anti-lipid, and/or anti-platelets(Stroke Metric)  Outcome: Progressing Towards Goal  Goal: *Tolerating diet  Outcome: Progressing Towards Goal  Goal: *Aware of follow-up diagnostics related to anticoagulants  Outcome: Progressing Towards Goal  Goal: *Ability to perform ADLs and demonstrates progressive mobility and function  Outcome: Progressing Towards Goal  Goal: *Absence of DVT(Stroke Metric)  Outcome: Progressing Towards Goal  Goal: *Absence of aspiration  Outcome: Progressing Towards Goal  Goal: *Optimal pain control at patient's stated goal  Outcome: Progressing Towards Goal  Goal: *Home safety concerns addressed  Outcome: Progressing Towards Goal  Goal: *Describes available resources and support systems  Outcome: Progressing Towards Goal  Goal: *Verbalizes understanding of activation of EMS(911) for stroke symptoms(Stroke Metric)  Outcome: Progressing Towards Goal  Goal: *Understands and describes signs and symptoms to report to providers(Stroke Metric)  Outcome: Progressing Towards Goal  Goal: *Neurolgocially stable (absence of additional neurological deficits)  Outcome: Progressing Towards Goal  Goal: *Verbalizes importance of follow-up with primary care physician(Stroke Metric)  Outcome: Progressing Towards Goal  Goal: *Smoking cessation discussed,if applicable(Stroke Metric)  Outcome: Progressing Towards Goal  Goal: *Depression screening completed(Stroke Metric)  Outcome: Progressing Towards Goal     Problem: Falls - Risk of  Goal: *Absence of Falls  Description: Document Lobo Fall Risk and appropriate interventions in the flowsheet.   Outcome: Progressing Towards Goal  Note: Fall Risk Interventions:  Mobility Interventions: Bed/chair exit alarm, OT consult for ADLs, Patient to call before getting OOB         Medication Interventions: Patient to call before getting OOB, Teach patient to arise slowly    Elimination Interventions: Call light in reach, Bed/chair exit alarm, Stay With Me (per policy)    History of Falls Interventions: Room close to nurse's station         Problem: Patient Education: Go to Patient Education Activity  Goal: Patient/Family Education  Outcome: Progressing Towards Goal     Problem: Pressure Injury - Risk of  Goal: *Prevention of pressure injury  Description: Document Robe Scale and appropriate interventions in the flowsheet.   Outcome: Progressing Towards Goal     Problem: Patient Education: Go to Patient Education Activity  Goal: Patient/Family Education  Outcome: Progressing Towards Goal

## 2021-10-22 NOTE — PROGRESS NOTES
Bedside and Verbal shift change report given to Roberta Angel [address] (oncoming nurse) by Roetta Fleischer (offgoing nurse). Report included the following information SBAR, Kardex, MAR, Cardiac Rhythm NSR and Dual Neuro Assessment.

## 2021-10-22 NOTE — PROGRESS NOTES
Transition of Care Plan:  IPR: CELESTE accepted     RUR: 8%     PCP F/U: Dr. Gallo Sanders     Disposition: IPR-CELESTE     Transportation: BLS     Main Contact: Sotero French, [phone]     0147: [facility] does not have a bed for patient today and is on the list for tomorrow-10/23/2021. MD notified. 1430: Jade Reddy is the [facility] liaison on call this weekend and can be reached at [phone].    1630: Submitted [facility] transport request for will call tomorrow-10/23/2021    Will continue to follow.      Yoan Ordonez RN

## 2021-10-23 VITALS
RESPIRATION RATE: 18 BRPM | DIASTOLIC BLOOD PRESSURE: 55 MMHG | HEART RATE: 58 BPM | SYSTOLIC BLOOD PRESSURE: 144 MMHG | HEIGHT: 64 IN | BODY MASS INDEX: 28 KG/M2 | OXYGEN SATURATION: 95 % | WEIGHT: 164 LBS | TEMPERATURE: 97.8 F

## 2021-10-23 PROCEDURE — 74011250637 HC RX REV CODE- 250/637: Performed by: NURSE PRACTITIONER

## 2021-10-23 PROCEDURE — 74011250637 HC RX REV CODE- 250/637: Performed by: RADIOLOGY

## 2021-10-23 RX ORDER — BUPROPION HYDROCHLORIDE 150 MG/1
150 TABLET, EXTENDED RELEASE ORAL 2 TIMES DAILY
Qty: 60 TABLET | Refills: 0 | Status: SHIPPED
Start: 2021-10-23 | End: 2021-12-03

## 2021-10-23 RX ORDER — MECLIZINE HCL 12.5 MG 12.5 MG/1
12.5 TABLET ORAL
Qty: 10 TABLET | Refills: 0 | Status: SHIPPED
Start: 2021-10-23 | End: 2021-11-02

## 2021-10-23 RX ORDER — MIDODRINE HYDROCHLORIDE 5 MG/1
5 TABLET ORAL
Qty: 90 TABLET | Refills: 0 | Status: SHIPPED
Start: 2021-10-23 | End: 2021-11-22

## 2021-10-23 RX ADMIN — TICAGRELOR 45 MG: 90 TABLET ORAL at 09:50

## 2021-10-23 RX ADMIN — PANTOPRAZOLE SODIUM 40 MG: 40 TABLET, DELAYED RELEASE ORAL at 07:08

## 2021-10-23 RX ADMIN — TICAGRELOR 45 MG: 90 TABLET ORAL at 20:09

## 2021-10-23 RX ADMIN — BUPROPION HYDROCHLORIDE 150 MG: 150 TABLET, EXTENDED RELEASE ORAL at 09:50

## 2021-10-23 RX ADMIN — MIDODRINE HYDROCHLORIDE 5 MG: 5 TABLET ORAL at 09:52

## 2021-10-23 RX ADMIN — ASPIRIN 81 MG CHEWABLE TABLET 81 MG: 81 TABLET CHEWABLE at 09:50

## 2021-10-23 RX ADMIN — DULOXETINE HYDROCHLORIDE 20 MG: 20 CAPSULE, DELAYED RELEASE ORAL at 09:50

## 2021-10-23 RX ADMIN — EZETIMIBE 10 MG: 10 TABLET ORAL at 09:50

## 2021-10-23 RX ADMIN — MIDODRINE HYDROCHLORIDE 5 MG: 5 TABLET ORAL at 12:53

## 2021-10-23 NOTE — PROGRESS NOTES
Problem: Pain  Goal: *Control of Pain  Outcome: Progressing Towards Goal  Goal: *PALLIATIVE CARE:  Alleviation of Pain  Outcome: Progressing Towards Goal     Problem: Patient Education: Go to Patient Education Activity  Goal: Patient/Family Education  Outcome: Progressing Towards Goal     Problem: Patient Education: Go to Patient Education Activity  Goal: Patient/Family Education  Outcome: Progressing Towards Goal     Problem: TIA/CVA Stroke: 0-24 hours  Goal: Off Pathway (Use only if patient is Off Pathway)  Outcome: Progressing Towards Goal  Goal: Activity/Safety  Outcome: Progressing Towards Goal  Goal: Consults, if ordered  Outcome: Progressing Towards Goal  Goal: Diagnostic Test/Procedures  Outcome: Progressing Towards Goal  Goal: Nutrition/Diet  Outcome: Progressing Towards Goal  Goal: Discharge Planning  Outcome: Progressing Towards Goal  Goal: Medications  Outcome: Progressing Towards Goal  Goal: Respiratory  Outcome: Progressing Towards Goal  Goal: Treatments/Interventions/Procedures  Outcome: Progressing Towards Goal  Goal: Minimize risk of bleeding post-thrombolytic infusion  Outcome: Progressing Towards Goal  Goal: Monitor for complications post-thrombolytic infusion  Outcome: Progressing Towards Goal  Goal: Psychosocial  Outcome: Progressing Towards Goal  Goal: *Hemodynamically stable  Outcome: Progressing Towards Goal  Goal: *Neurologically stable  Description: Absence of additional neurological deficits    Outcome: Progressing Towards Goal  Goal: *Verbalizes anxiety and depression are reduced or absent  Outcome: Progressing Towards Goal  Goal: *Absence of Signs of Aspiration on Current Diet  Outcome: Progressing Towards Goal  Goal: *Absence of deep venous thrombosis signs and symptoms(Stroke Metric)  Outcome: Progressing Towards Goal  Goal: *Ability to perform ADLs and demonstrates progressive mobility and function  Outcome: Progressing Towards Goal  Goal: *Stroke education started(Stroke Metric)  Outcome: Progressing Towards Goal  Goal: *Dysphagia screen performed(Stroke Metric)  Outcome: Progressing Towards Goal  Goal: *Rehab consulted(Stroke Metric)  Outcome: Progressing Towards Goal     Problem: TIA/CVA Stroke: Day 2 Until Discharge  Goal: Off Pathway (Use only if patient is Off Pathway)  Outcome: Progressing Towards Goal  Goal: Activity/Safety  Outcome: Progressing Towards Goal  Goal: Diagnostic Test/Procedures  Outcome: Progressing Towards Goal  Goal: Nutrition/Diet  Outcome: Progressing Towards Goal  Goal: Discharge Planning  Outcome: Progressing Towards Goal  Goal: Medications  Outcome: Progressing Towards Goal  Goal: Respiratory  Outcome: Progressing Towards Goal  Goal: Treatments/Interventions/Procedures  Outcome: Progressing Towards Goal  Goal: Psychosocial  Outcome: Progressing Towards Goal  Goal: *Verbalizes anxiety and depression are reduced or absent  Outcome: Progressing Towards Goal  Goal: *Absence of aspiration  Outcome: Progressing Towards Goal  Goal: *Absence of deep venous thrombosis signs and symptoms(Stroke Metric)  Outcome: Progressing Towards Goal  Goal: *Optimal pain control at patient's stated goal  Outcome: Progressing Towards Goal  Goal: *Tolerating diet  Outcome: Progressing Towards Goal  Goal: *Ability to perform ADLs and demonstrates progressive mobility and function  Outcome: Progressing Towards Goal  Goal: *Stroke education continued(Stroke Metric)  Outcome: Progressing Towards Goal     Problem: Ischemic Stroke: Discharge Outcomes  Goal: *Verbalizes anxiety and depression are reduced or absent  Outcome: Progressing Towards Goal  Goal: *Verbalize understanding of risk factor modification(Stroke Metric)  Outcome: Progressing Towards Goal  Goal: *Hemodynamically stable  Outcome: Progressing Towards Goal  Goal: *Absence of aspiration pneumonia  Outcome: Progressing Towards Goal  Goal: *Aware of needed dietary changes  Outcome: Progressing Towards Goal  Goal: *Verbalize understanding of prescribed medications including anti-coagulants, anti-lipid, and/or anti-platelets(Stroke Metric)  Outcome: Progressing Towards Goal  Goal: *Tolerating diet  Outcome: Progressing Towards Goal  Goal: *Aware of follow-up diagnostics related to anticoagulants  Outcome: Progressing Towards Goal  Goal: *Ability to perform ADLs and demonstrates progressive mobility and function  Outcome: Progressing Towards Goal  Goal: *Absence of DVT(Stroke Metric)  Outcome: Progressing Towards Goal  Goal: *Absence of aspiration  Outcome: Progressing Towards Goal  Goal: *Optimal pain control at patient's stated goal  Outcome: Progressing Towards Goal  Goal: *Home safety concerns addressed  Outcome: Progressing Towards Goal  Goal: *Describes available resources and support systems  Outcome: Progressing Towards Goal  Goal: *Verbalizes understanding of activation of EMS(911) for stroke symptoms(Stroke Metric)  Outcome: Progressing Towards Goal  Goal: *Understands and describes signs and symptoms to report to providers(Stroke Metric)  Outcome: Progressing Towards Goal  Goal: *Neurolgocially stable (absence of additional neurological deficits)  Outcome: Progressing Towards Goal  Goal: *Verbalizes importance of follow-up with primary care physician(Stroke Metric)  Outcome: Progressing Towards Goal  Goal: *Smoking cessation discussed,if applicable(Stroke Metric)  Outcome: Progressing Towards Goal  Goal: *Depression screening completed(Stroke Metric)  Outcome: Progressing Towards Goal     Problem: Falls - Risk of  Goal: *Absence of Falls  Description: Document Lobo Fall Risk and appropriate interventions in the flowsheet.   Outcome: Progressing Towards Goal  Note: Fall Risk Interventions:  Mobility Interventions: OT consult for ADLs, PT Consult for mobility concerns         Medication Interventions: Bed/chair exit alarm, Patient to call before getting OOB    Elimination Interventions: Stay With Me (per policy), Toileting schedule/hourly rounds, Call light in reach    History of Falls Interventions: Room close to nurse's station         Problem: Patient Education: Go to Patient Education Activity  Goal: Patient/Family Education  Outcome: Progressing Towards Goal     Problem: Pressure Injury - Risk of  Goal: *Prevention of pressure injury  Description: Document Robe Scale and appropriate interventions in the flowsheet.   Outcome: Progressing Towards Goal     Problem: Patient Education: Go to Patient Education Activity  Goal: Patient/Family Education  Outcome: Progressing Towards Goal

## 2021-10-23 NOTE — PROGRESS NOTES
Transition of Care Plan  RUR 9%    Disposition  CELESTE     Transportation [facility] phone [phone]  Confirmed at 4 pm today    Medical follow up  PCP and specialist    Contact  Daughter Kimberly Shine  [phone]    CM talked with Dr Denia Hu and patient is medically ready for discharge. Zahra Herr liaison [phone], with Neftali Sullivan confirmed that patient can be admitted today  Room 2131  Nurse to call reportg [phone]    JACKIE completed CM portion of [employer] pt has received, reviewed, and signed 2nd IM letter informing them of their right to appeal the discharge. Signed copy has been placed on pt bedside chart. CM met with patient and she is in agreement with discharge.

## 2021-10-23 NOTE — PROGRESS NOTES
Bedside and Verbal shift change report given to Rodrigo BEE (oncoming nurse) by Chang Byers (offgoing nurse). Report included the following information SBAR, Kardex, MAR, Cardiac Rhythm NSR and Dual Neuro Assessment.

## 2021-10-23 NOTE — DISCHARGE SUMMARY
Discharge Summary       PATIENT ID: Sung Allen  MRN: [id number]   YOB: 1940    DATE OF ADMISSION: 10/14/2021  8:19 AM    DATE OF DISCHARGE: 10/23/2021   PRIMARY CARE PROVIDER: Radha Sorenson MD     ATTENDING PHYSICIAN: Malina Shanks DO   DISCHARGING PROVIDER: Malina Shanks DO    To contact this individual call [phone] and ask the  to page. If unavailable ask to be transferred the Adult Hospitalist Department. CONSULTATIONS: IP CONSULT TO HOSPITALIST  IP CONSULT TO NEUROLOGY  IP CONSULT TO NEUROINTERVENTIONAL SURGERY    PROCEDURES/SURGERIES: * No surgery found *    ADMITTING DIAGNOSES & HOSPITAL COURSE:   Admission History:  [de-identified] y.o. female with a past medical history of GERD, HLD, HTN and depression who presented to the emergency department via ems with slurred speech and right-sided weakness. Level 1 Code stroke called. Hospitalist was consulted for admission. Upon examination, patient is awake, alert and oriented x4. Daughters at bedside. States that she was on the phone with her daughter this morning around 0715 when she developed slurred speech and right-sided weakness. Daughter states that she was unable to get her words out correctly. EMS was called and she was brought to the emergency department for further treatment. Reviewed medications with patient and daughters. Patient states she only takes Wellbutrin, Prevacid and aspirin. She was recently prescribed medication for hypertension and hyperlipidemia but does not recall the name of the medication and only takes half pill of the antihypertensive. In ED: Level 1 code stroke called. Evaluated by Neuro NP and teleneurology. CTA head/neck: Severe 80-90% stenosis left proximal internal carotid artery. NIS evaluated patient in ED. \"    CTA head:  IMPRESSION  1. Severe 80-90% stenosis left proximal internal carotid artery. See above. 2. Mild/moderate stenosis origin left common carotid artery.   3. Approximately 20% stenosis proximal right internal carotid artery. 4. No definitive intraluminal filling defect or other CTA findings of large  vessel intracranial occlusion. 5. Asymmetric decreased size left MCA may be related to flow limitation from the  internal carotid artery stenosis. MRI brain:  IMPRESSION  1. Minimal increase in size of previously demonstrated left inferior parietal  infarct. New tiny left frontal cortical infarct. 2. Otherwise unchanged. Background of cerebral volume loss and mild white matter  signal abnormality. 3. Pituitary cystic lesion again noted. No pituitary gland enlargement    DISCHARGE DIAGNOSES / PLAN:      CVA, ischemic left parietal:   Severe left ICA stenosis:   - CTA head/neck- Severe 80-90% stenosis L- proximal internal carotid artery   - MRI L parietal lobe ischemic CVA. 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 Repeat CT head stable. - Neurology following - SLP/PT/OT- recommend inpatient rehab   - Zetia. Reports intolerance to statin   - Aspirin and Brilinta  - A1c 5.6, LDL 77  - dizziness/nausea/orthostatic hypotension (resolved)- s/p IV fluids, continue midodrine, echo unrevealing, meclizine prn    -- subsequent orthostatic blood pressure negative  - repeat MRI brain: slightly increased size of prev L parietal CVA, new small infarct in L frontal - reviewed by neuroIR and unchanged      Hypertension: blood pressure controlled while inpatient   Elevated creatinine: mild. IVF. Encourage po intake.    GERD - Continue PPI  Depression - Supportive treatment - Continue home dose wellbutrin, duloxetine   Elevated TSH: mild, T4 normal          PENDING TEST RESULTS:   At the time of discharge the following test results are still pending: none    FOLLOW UP APPOINTMENTS:    Follow-up Information     Follow up With Specialties Details Why Contact Tasha Miller MD Internal Medicine   [facility] [address] [facility]  [phone]      Ruben Hook MD Neurosurgery  Please schedule follow up with neurosurgery for further evaluation of Left Sella/Pituitary Cystic Mass [address] [facility] [address]      Winston Jama MD Radiology, Neuroradiology  Please follow up with neurointerventional surgery after rehab  [address]  [address] [facility] [address] [place] [address]  [phone]          DISCHARGE MEDICATIONS:  Current Discharge Medication List      START taking these medications    Details   meclizine (ANTIVERT) 12.5 mg tablet Take 1 Tablet by mouth every six (6) hours as needed for Dizziness for up to 10 days. Qty: 10 Tablet, Refills: 0  Start date: 10/23/2021, End date: 11/2/2021      midodrine (PROAMATINE) 5 mg tablet Take 1 Tablet by mouth three (3) times daily (with meals) for 30 days. Indications: a feeling of dizziness upon standing due to a drop in blood pressure  Qty: 90 Tablet, Refills: 0  Start date: 10/23/2021, End date: 11/22/2021      ticagrelor (BRILINTA) 90 mg tablet Take 0.5 Tablets by mouth every twelve (12) hours every twelve (12) hours. Qty: 60 Tablet, Refills: 0  Start date: 10/23/2021         CONTINUE these medications which have CHANGED    Details   buPROPion SR (Wellbutrin SR) 150 mg SR tablet Take 1 Tablet by mouth two (2) times a day. Qty: 60 Tablet, Refills: 0  Start date: 10/23/2021         CONTINUE these medications which have NOT CHANGED    Details   DULoxetine (CYMBALTA) 20 mg capsule Take 20 mg by mouth daily. ezetimibe (Zetia) 10 mg tablet Take  by mouth daily. buPROPion XL (Wellbutrin XL) 300 mg XL tablet Take 300 mg by mouth daily. aspirin delayed-release 81 mg tablet Take 81 mg by mouth daily. lansoprazole (PREVACID) 15 mg disintegrating tablet Take 30 mg by mouth Daily (before breakfast).          STOP taking these medications       felodipine (PLENDIL SR) 5 mg 24 hr tablet Comments:   Reason for Stopping: NOTIFY YOUR PHYSICIAN FOR ANY OF THE FOLLOWING:   Fever over 101 degrees for 24 hours. Chest pain, shortness of breath, fever, chills, nausea, vomiting, diarrhea, change in mentation, falling, weakness, bleeding. Severe pain or pain not relieved by medications. Or, any other signs or symptoms that you may have questions about. DISPOSITION:    Home With:   OT  PT  HH  RN      x Long term SNF/Inpatient Rehab    Independent/assisted living    Hospice    Other:       PATIENT CONDITION AT DISCHARGE:     Functional status    Poor    x Deconditioned     Independent      Cognition   x  Lucid     Forgetful     Dementia      Catheters/lines (plus indication)    Santos     PICC     PEG    x None      Code status   x  Full code     DNR      PHYSICAL EXAMINATION AT DISCHARGE:  General:  Alert, oriented, No acute distress. Pleasant elderly, obese [employer]  Resp:  No accessory muscle use, Good AE, no wheezes. no crepitations  Abd:  Soft, non-tender, non-distended, BS+  Extremities:  No cyanosis or clubbing, no significant edema  Neuro:  Grossly normal, no focal neuro deficits, follows commands, diminished  strength right upper extremity   Psych:  Good insight, not agitated. CHRONIC MEDICAL DIAGNOSES:  Problem List as of 10/23/2021 Date Reviewed: 10/22/2018        Codes Class Noted - Resolved    Orthostatic hypotension ICD-10-CM: I95.1  ICD-9-CM: 458.0  10/18/2021 - Present        CVA (cerebral vascular accident) [facility]) ICD-10-CM: I63.9  ICD-9-CM: 434.91  10/14/2021 - Present        Depression ICD-10-CM: F32. A  ICD-9-CM: 828  10/25/2018 - Present        Acute hip pain, left ICD-10-CM: M25.552  ICD-9-CM: 719.45  10/22/2018 - Present        Fracture of greater trochanter of left femur [facility]) ICD-10-CM: J19.791R  ICD-9-CM: 820.20  10/22/2018 - Present              Greater than 30 minutes were spent with the patient on counseling and coordination of care    Signed:   Melissa Jimenez DO  10/23/2021  10:32 AM

## 2021-10-23 NOTE — ROUTINE PROCESS
TRANSFER - OUT REPORT:    Verbal report given to THE [facility]) on Alberto Bent  being transferred to [facility]) for routine progression of care       Report consisted of patients Situation, Background, Assessment and   Recommendations(SBAR). Information from the following report(s) SBAR, Kardex, ED Summary, Procedure Summary, Intake/Output and MAR was reviewed with the receiving nurse. Opportunity for questions and clarification was provided.

## 2021-10-24 NOTE — PROGRESS NOTES
Pt discharged with EMTALA and [facility] to [facility] at 2100 10/23/2021. Discharge vitals:        10/23/21 2050   Vital Signs   Temp 97.8 °F (36.6 °C)   Temp Source Oral   Pulse (Heart Rate) (!) 58   Heart Rate Source Monitor   Cardiac Rhythm Sinus Rhythm   Resp Rate 18   O2 Sat (%) 95 %   Level of Consciousness Alert (0)   BP (!) 144/55   MAP (Calculated) 85   BP 1 Method Automatic   BP 1 Location Left upper arm   BP Patient Position At rest   MEWS Score 1   Box Number [id number]   Electrodes Replaced No       I have reviewed discharge instructions with the patient. The patient verbalized understanding. Current Discharge Medication List      START taking these medications    Details   meclizine (ANTIVERT) 12.5 mg tablet Take 1 Tablet by mouth every six (6) hours as needed for Dizziness for up to 10 days. Qty: 10 Tablet, Refills: 0  Start date: 10/23/2021, End date: 11/2/2021      midodrine (PROAMATINE) 5 mg tablet Take 1 Tablet by mouth three (3) times daily (with meals) for 30 days. Indications: a feeling of dizziness upon standing due to a drop in blood pressure  Qty: 90 Tablet, Refills: 0  Start date: 10/23/2021, End date: 11/22/2021      ticagrelor (BRILINTA) 90 mg tablet Take 0.5 Tablets by mouth every twelve (12) hours every twelve (12) hours. Qty: 60 Tablet, Refills: 0  Start date: 10/23/2021         CONTINUE these medications which have CHANGED    Details   buPROPion SR (Wellbutrin SR) 150 mg SR tablet Take 1 Tablet by mouth two (2) times a day. Qty: 60 Tablet, Refills: 0  Start date: 10/23/2021         CONTINUE these medications which have NOT CHANGED    Details   DULoxetine (CYMBALTA) 20 mg capsule Take 20 mg by mouth daily. ezetimibe (Zetia) 10 mg tablet Take  by mouth daily. buPROPion XL (Wellbutrin XL) 300 mg XL tablet Take 300 mg by mouth daily. aspirin delayed-release 81 mg tablet Take 81 mg by mouth daily.       lansoprazole (PREVACID) 15 mg disintegrating tablet Take 30 mg by mouth Daily (before breakfast).          STOP taking these medications       felodipine (PLENDIL SR) 5 mg 24 hr tablet Comments:   Reason for Stopping:

## 2021-12-02 ENCOUNTER — APPOINTMENT (OUTPATIENT)
Dept: CT IMAGING | Age: 81
DRG: 312 | End: 2021-12-02
Attending: EMERGENCY MEDICINE
Payer: MEDICARE

## 2021-12-02 ENCOUNTER — HOSPITAL ENCOUNTER (INPATIENT)
Age: 81
LOS: 4 days | Discharge: HOME HEALTH | DRG: 312 | End: 2021-12-07
Attending: EMERGENCY MEDICINE | Admitting: INTERNAL MEDICINE
Payer: MEDICARE

## 2021-12-02 ENCOUNTER — APPOINTMENT (OUTPATIENT)
Dept: GENERAL RADIOLOGY | Age: 81
DRG: 312 | End: 2021-12-02
Attending: EMERGENCY MEDICINE
Payer: MEDICARE

## 2021-12-02 DIAGNOSIS — R55 SYNCOPE AND COLLAPSE: ICD-10-CM

## 2021-12-02 DIAGNOSIS — I95.1 ORTHOSTATIC HYPOTENSION: ICD-10-CM

## 2021-12-02 DIAGNOSIS — J69.0 ASPIRATION PNEUMONIA OF RIGHT LOWER LOBE DUE TO VOMIT (HCC): ICD-10-CM

## 2021-12-02 DIAGNOSIS — R19.7 NAUSEA VOMITING AND DIARRHEA: Primary | ICD-10-CM

## 2021-12-02 DIAGNOSIS — R11.2 NAUSEA VOMITING AND DIARRHEA: Primary | ICD-10-CM

## 2021-12-02 LAB
ALBUMIN SERPL-MCNC: 2.8 G/DL (ref 3.5–5)
ALBUMIN/GLOB SERPL: 0.7 {RATIO} (ref 1.1–2.2)
ALP SERPL-CCNC: 140 U/L (ref 45–117)
ALT SERPL-CCNC: 22 U/L (ref 12–78)
ANION GAP SERPL CALC-SCNC: 11 MMOL/L (ref 5–15)
AST SERPL-CCNC: 22 U/L (ref 15–37)
BASOPHILS # BLD: 0 K/UL (ref 0–0.1)
BASOPHILS NFR BLD: 0 % (ref 0–1)
BILIRUB SERPL-MCNC: 0.2 MG/DL (ref 0.2–1)
BUN SERPL-MCNC: 17 MG/DL (ref 6–20)
BUN/CREAT SERPL: 15 (ref 12–20)
CALCIUM SERPL-MCNC: 8.3 MG/DL (ref 8.5–10.1)
CHLORIDE SERPL-SCNC: 110 MMOL/L (ref 97–108)
CO2 SERPL-SCNC: 22 MMOL/L (ref 21–32)
COMMENT, HOLDF: NORMAL
CREAT SERPL-MCNC: 1.13 MG/DL (ref 0.55–1.02)
DIFFERENTIAL METHOD BLD: NORMAL
EOSINOPHIL # BLD: 0.1 K/UL (ref 0–0.4)
EOSINOPHIL NFR BLD: 2 % (ref 0–7)
ERYTHROCYTE [DISTWIDTH] IN BLOOD BY AUTOMATED COUNT: 13.2 % (ref 11.5–14.5)
GLOBULIN SER CALC-MCNC: 4 G/DL (ref 2–4)
GLUCOSE SERPL-MCNC: 97 MG/DL (ref 65–100)
HCT VFR BLD AUTO: 36.8 % (ref 35–47)
HGB BLD-MCNC: 12 G/DL (ref 11.5–16)
IMM GRANULOCYTES # BLD AUTO: 0 K/UL (ref 0–0.04)
IMM GRANULOCYTES NFR BLD AUTO: 0 % (ref 0–0.5)
LACTATE SERPL-SCNC: 0.6 MMOL/L (ref 0.4–2)
LIPASE SERPL-CCNC: 156 U/L (ref 73–393)
LYMPHOCYTES # BLD: 1.1 K/UL (ref 0.8–3.5)
LYMPHOCYTES NFR BLD: 16 % (ref 12–49)
MAGNESIUM SERPL-MCNC: 2.1 MG/DL (ref 1.6–2.4)
MCH RBC QN AUTO: 29.3 PG (ref 26–34)
MCHC RBC AUTO-ENTMCNC: 32.6 G/DL (ref 30–36.5)
MCV RBC AUTO: 90 FL (ref 80–99)
MONOCYTES # BLD: 0.7 K/UL (ref 0–1)
MONOCYTES NFR BLD: 10 % (ref 5–13)
NEUTS SEG # BLD: 5.1 K/UL (ref 1.8–8)
NEUTS SEG NFR BLD: 72 % (ref 32–75)
NRBC # BLD: 0 K/UL (ref 0–0.01)
NRBC BLD-RTO: 0 PER 100 WBC
PLATELET # BLD AUTO: 272 K/UL (ref 150–400)
PMV BLD AUTO: 10 FL (ref 8.9–12.9)
POTASSIUM SERPL-SCNC: 3.3 MMOL/L (ref 3.5–5.1)
PROT SERPL-MCNC: 6.8 G/DL (ref 6.4–8.2)
RBC # BLD AUTO: 4.09 M/UL (ref 3.8–5.2)
SAMPLES BEING HELD,HOLD: NORMAL
SODIUM SERPL-SCNC: 143 MMOL/L (ref 136–145)
TROPONIN-HIGH SENSITIVITY: 9 NG/L (ref 0–51)
WBC # BLD AUTO: 7 K/UL (ref 3.6–11)

## 2021-12-02 PROCEDURE — 80053 COMPREHEN METABOLIC PANEL: CPT

## 2021-12-02 PROCEDURE — 96365 THER/PROPH/DIAG IV INF INIT: CPT

## 2021-12-02 PROCEDURE — 36415 COLL VENOUS BLD VENIPUNCTURE: CPT

## 2021-12-02 PROCEDURE — 74011000636 HC RX REV CODE- 636: Performed by: RADIOLOGY

## 2021-12-02 PROCEDURE — 99285 EMERGENCY DEPT VISIT HI MDM: CPT

## 2021-12-02 PROCEDURE — 70450 CT HEAD/BRAIN W/O DYE: CPT

## 2021-12-02 PROCEDURE — 74011250636 HC RX REV CODE- 250/636: Performed by: EMERGENCY MEDICINE

## 2021-12-02 PROCEDURE — 83605 ASSAY OF LACTIC ACID: CPT

## 2021-12-02 PROCEDURE — 83735 ASSAY OF MAGNESIUM: CPT

## 2021-12-02 PROCEDURE — 70498 CT ANGIOGRAPHY NECK: CPT

## 2021-12-02 PROCEDURE — 74177 CT ABD & PELVIS W/CONTRAST: CPT

## 2021-12-02 PROCEDURE — 71045 X-RAY EXAM CHEST 1 VIEW: CPT

## 2021-12-02 PROCEDURE — 96361 HYDRATE IV INFUSION ADD-ON: CPT

## 2021-12-02 PROCEDURE — 93005 ELECTROCARDIOGRAM TRACING: CPT

## 2021-12-02 PROCEDURE — 85025 COMPLETE CBC W/AUTO DIFF WBC: CPT

## 2021-12-02 PROCEDURE — 96374 THER/PROPH/DIAG INJ IV PUSH: CPT

## 2021-12-02 PROCEDURE — 83690 ASSAY OF LIPASE: CPT

## 2021-12-02 PROCEDURE — 84484 ASSAY OF TROPONIN QUANT: CPT

## 2021-12-02 RX ORDER — ONDANSETRON 2 MG/ML
4 INJECTION INTRAMUSCULAR; INTRAVENOUS
Status: COMPLETED | OUTPATIENT
Start: 2021-12-02 | End: 2021-12-02

## 2021-12-02 RX ADMIN — SODIUM CHLORIDE 1000 ML: 9 INJECTION, SOLUTION INTRAVENOUS at 22:01

## 2021-12-02 RX ADMIN — ONDANSETRON 4 MG: 2 INJECTION INTRAMUSCULAR; INTRAVENOUS at 22:01

## 2021-12-02 RX ADMIN — IOPAMIDOL 100 ML: 755 INJECTION, SOLUTION INTRAVENOUS at 22:56

## 2021-12-03 ENCOUNTER — APPOINTMENT (OUTPATIENT)
Dept: CT IMAGING | Age: 81
DRG: 312 | End: 2021-12-03
Attending: FAMILY MEDICINE
Payer: MEDICARE

## 2021-12-03 PROBLEM — R55 SYNCOPE: Status: ACTIVE | Noted: 2021-12-03

## 2021-12-03 LAB
APPEARANCE UR: CLEAR
APPEARANCE UR: CLEAR
ATRIAL RATE: 86 BPM
BACTERIA URNS QL MICRO: NEGATIVE /HPF
BACTERIA URNS QL MICRO: NEGATIVE /HPF
BASOPHILS # BLD: 0 K/UL (ref 0–0.1)
BASOPHILS NFR BLD: 0 % (ref 0–1)
BILIRUB UR QL: NEGATIVE
BILIRUB UR QL: NEGATIVE
BNP SERPL-MCNC: 64 PG/ML
CALCULATED P AXIS, ECG09: 44 DEGREES
CALCULATED R AXIS, ECG10: 14 DEGREES
CALCULATED T AXIS, ECG11: 32 DEGREES
COLOR UR: ABNORMAL
COLOR UR: ABNORMAL
D DIMER PPP FEU-MCNC: 0.78 MG/L FEU (ref 0–0.65)
D DIMER PPP FEU-MCNC: 0.85 MG/L FEU (ref 0–0.65)
DIAGNOSIS, 93000: NORMAL
DIFFERENTIAL METHOD BLD: ABNORMAL
EOSINOPHIL # BLD: 0.1 K/UL (ref 0–0.4)
EOSINOPHIL NFR BLD: 1 % (ref 0–7)
EPITH CASTS URNS QL MICRO: ABNORMAL /LPF
EPITH CASTS URNS QL MICRO: ABNORMAL /LPF
ERYTHROCYTE [DISTWIDTH] IN BLOOD BY AUTOMATED COUNT: 13.3 % (ref 11.5–14.5)
GLUCOSE UR STRIP.AUTO-MCNC: NEGATIVE MG/DL
GLUCOSE UR STRIP.AUTO-MCNC: NEGATIVE MG/DL
HCT VFR BLD AUTO: 34.4 % (ref 35–47)
HGB BLD-MCNC: 11.2 G/DL (ref 11.5–16)
HGB UR QL STRIP: NEGATIVE
HGB UR QL STRIP: NEGATIVE
HYALINE CASTS URNS QL MICRO: ABNORMAL /LPF (ref 0–5)
HYALINE CASTS URNS QL MICRO: ABNORMAL /LPF (ref 0–5)
IMM GRANULOCYTES # BLD AUTO: 0 K/UL (ref 0–0.04)
IMM GRANULOCYTES NFR BLD AUTO: 0 % (ref 0–0.5)
KETONES UR QL STRIP.AUTO: ABNORMAL MG/DL
KETONES UR QL STRIP.AUTO: ABNORMAL MG/DL
LEUKOCYTE ESTERASE UR QL STRIP.AUTO: ABNORMAL
LEUKOCYTE ESTERASE UR QL STRIP.AUTO: ABNORMAL
LIPASE SERPL-CCNC: 137 U/L (ref 73–393)
LYMPHOCYTES # BLD: 1.2 K/UL (ref 0.8–3.5)
LYMPHOCYTES NFR BLD: 23 % (ref 12–49)
MCH RBC QN AUTO: 30 PG (ref 26–34)
MCHC RBC AUTO-ENTMCNC: 32.6 G/DL (ref 30–36.5)
MCV RBC AUTO: 92.2 FL (ref 80–99)
MONOCYTES # BLD: 0.5 K/UL (ref 0–1)
MONOCYTES NFR BLD: 9 % (ref 5–13)
NEUTS SEG # BLD: 3.4 K/UL (ref 1.8–8)
NEUTS SEG NFR BLD: 67 % (ref 32–75)
NITRITE UR QL STRIP.AUTO: NEGATIVE
NITRITE UR QL STRIP.AUTO: NEGATIVE
NRBC # BLD: 0 K/UL (ref 0–0.01)
NRBC BLD-RTO: 0 PER 100 WBC
P-R INTERVAL, ECG05: 154 MS
PH UR STRIP: 6.5 [PH] (ref 5–8)
PH UR STRIP: 6.5 [PH] (ref 5–8)
PHOSPHATE SERPL-MCNC: 3.1 MG/DL (ref 2.6–4.7)
PLATELET # BLD AUTO: 266 K/UL (ref 150–400)
PMV BLD AUTO: 10.4 FL (ref 8.9–12.9)
PROT UR STRIP-MCNC: NEGATIVE MG/DL
PROT UR STRIP-MCNC: NEGATIVE MG/DL
Q-T INTERVAL, ECG07: 362 MS
QRS DURATION, ECG06: 78 MS
QTC CALCULATION (BEZET), ECG08: 433 MS
RBC # BLD AUTO: 3.73 M/UL (ref 3.8–5.2)
RBC #/AREA URNS HPF: ABNORMAL /HPF (ref 0–5)
RBC #/AREA URNS HPF: ABNORMAL /HPF (ref 0–5)
SP GR UR REFRACTOMETRY: 1.03 (ref 1–1.03)
SP GR UR REFRACTOMETRY: 1.03 (ref 1–1.03)
TROPONIN-HIGH SENSITIVITY: 11 NG/L (ref 0–51)
TSH SERPL DL<=0.05 MIU/L-ACNC: 3.44 UIU/ML (ref 0.36–3.74)
UR CULT HOLD, URHOLD: NORMAL
UROBILINOGEN UR QL STRIP.AUTO: 0.2 EU/DL (ref 0.2–1)
UROBILINOGEN UR QL STRIP.AUTO: 0.2 EU/DL (ref 0.2–1)
VENTRICULAR RATE, ECG03: 86 BPM
WBC # BLD AUTO: 5.1 K/UL (ref 3.6–11)
WBC URNS QL MICRO: ABNORMAL /HPF (ref 0–4)
WBC URNS QL MICRO: ABNORMAL /HPF (ref 0–4)
YEAST URNS QL MICRO: PRESENT

## 2021-12-03 PROCEDURE — 81001 URINALYSIS AUTO W/SCOPE: CPT

## 2021-12-03 PROCEDURE — 84443 ASSAY THYROID STIM HORMONE: CPT

## 2021-12-03 PROCEDURE — 83690 ASSAY OF LIPASE: CPT

## 2021-12-03 PROCEDURE — 74011250636 HC RX REV CODE- 250/636: Performed by: INTERNAL MEDICINE

## 2021-12-03 PROCEDURE — 74011250636 HC RX REV CODE- 250/636: Performed by: EMERGENCY MEDICINE

## 2021-12-03 PROCEDURE — 65660000000 HC RM CCU STEPDOWN

## 2021-12-03 PROCEDURE — 74011250637 HC RX REV CODE- 250/637: Performed by: FAMILY MEDICINE

## 2021-12-03 PROCEDURE — 84100 ASSAY OF PHOSPHORUS: CPT

## 2021-12-03 PROCEDURE — 84484 ASSAY OF TROPONIN QUANT: CPT

## 2021-12-03 PROCEDURE — 87040 BLOOD CULTURE FOR BACTERIA: CPT

## 2021-12-03 PROCEDURE — 85025 COMPLETE CBC W/AUTO DIFF WBC: CPT

## 2021-12-03 PROCEDURE — 74011000250 HC RX REV CODE- 250: Performed by: INTERNAL MEDICINE

## 2021-12-03 PROCEDURE — 74011250637 HC RX REV CODE- 250/637: Performed by: INTERNAL MEDICINE

## 2021-12-03 PROCEDURE — 96361 HYDRATE IV INFUSION ADD-ON: CPT

## 2021-12-03 PROCEDURE — 71275 CT ANGIOGRAPHY CHEST: CPT

## 2021-12-03 PROCEDURE — 83880 ASSAY OF NATRIURETIC PEPTIDE: CPT

## 2021-12-03 PROCEDURE — 74011000636 HC RX REV CODE- 636: Performed by: RADIOLOGY

## 2021-12-03 PROCEDURE — 85379 FIBRIN DEGRADATION QUANT: CPT

## 2021-12-03 PROCEDURE — 74011000258 HC RX REV CODE- 258: Performed by: EMERGENCY MEDICINE

## 2021-12-03 PROCEDURE — 36415 COLL VENOUS BLD VENIPUNCTURE: CPT

## 2021-12-03 PROCEDURE — 74011000258 HC RX REV CODE- 258: Performed by: INTERNAL MEDICINE

## 2021-12-03 RX ORDER — ENOXAPARIN SODIUM 100 MG/ML
40 INJECTION SUBCUTANEOUS DAILY
Status: DISCONTINUED | OUTPATIENT
Start: 2021-12-03 | End: 2021-12-08 | Stop reason: HOSPADM

## 2021-12-03 RX ORDER — THERA TABS 400 MCG
1 TAB ORAL DAILY
COMMUNITY
End: 2021-12-03

## 2021-12-03 RX ORDER — ACETAMINOPHEN 650 MG/1
650 SUPPOSITORY RECTAL
Status: DISCONTINUED | OUTPATIENT
Start: 2021-12-03 | End: 2021-12-08 | Stop reason: HOSPADM

## 2021-12-03 RX ORDER — LANOLIN ALCOHOL/MO/W.PET/CERES
1000 CREAM (GRAM) TOPICAL DAILY
COMMUNITY

## 2021-12-03 RX ORDER — BUPROPION HYDROCHLORIDE 150 MG/1
150 TABLET ORAL DAILY
COMMUNITY

## 2021-12-03 RX ORDER — ACETAMINOPHEN 325 MG/1
650 TABLET ORAL
COMMUNITY

## 2021-12-03 RX ORDER — BUPROPION HYDROCHLORIDE 150 MG/1
150 TABLET, EXTENDED RELEASE ORAL 2 TIMES DAILY
Status: DISCONTINUED | OUTPATIENT
Start: 2021-12-03 | End: 2021-12-03

## 2021-12-03 RX ORDER — THERA TABS 400 MCG
1 TAB ORAL DAILY
COMMUNITY

## 2021-12-03 RX ORDER — BUPROPION HYDROCHLORIDE 150 MG/1
150 TABLET ORAL DAILY
Status: DISCONTINUED | OUTPATIENT
Start: 2021-12-03 | End: 2021-12-08 | Stop reason: HOSPADM

## 2021-12-03 RX ORDER — DULOXETIN HYDROCHLORIDE 20 MG/1
20 CAPSULE, DELAYED RELEASE ORAL DAILY
Status: DISCONTINUED | OUTPATIENT
Start: 2021-12-03 | End: 2021-12-03

## 2021-12-03 RX ORDER — ONDANSETRON 4 MG/1
4 TABLET, ORALLY DISINTEGRATING ORAL
Status: DISCONTINUED | OUTPATIENT
Start: 2021-12-03 | End: 2021-12-08 | Stop reason: HOSPADM

## 2021-12-03 RX ORDER — AMLODIPINE BESYLATE 2.5 MG/1
2.5 TABLET ORAL DAILY
COMMUNITY
End: 2021-12-07

## 2021-12-03 RX ORDER — ASPIRIN 81 MG/1
81 TABLET ORAL DAILY
Status: DISCONTINUED | OUTPATIENT
Start: 2021-12-03 | End: 2021-12-08 | Stop reason: HOSPADM

## 2021-12-03 RX ORDER — LATANOPROST 50 UG/ML
1 SOLUTION/ DROPS OPHTHALMIC
COMMUNITY

## 2021-12-03 RX ORDER — SODIUM CHLORIDE 0.9 % (FLUSH) 0.9 %
5-40 SYRINGE (ML) INJECTION EVERY 8 HOURS
Status: DISCONTINUED | OUTPATIENT
Start: 2021-12-03 | End: 2021-12-08 | Stop reason: HOSPADM

## 2021-12-03 RX ORDER — POTASSIUM CHLORIDE 750 MG/1
40 TABLET, FILM COATED, EXTENDED RELEASE ORAL
Status: COMPLETED | OUTPATIENT
Start: 2021-12-03 | End: 2021-12-03

## 2021-12-03 RX ORDER — EZETIMIBE 10 MG/1
10 TABLET ORAL DAILY
Status: DISCONTINUED | OUTPATIENT
Start: 2021-12-03 | End: 2021-12-03

## 2021-12-03 RX ORDER — POLYETHYLENE GLYCOL 3350 17 G/17G
17 POWDER, FOR SOLUTION ORAL DAILY PRN
Status: DISCONTINUED | OUTPATIENT
Start: 2021-12-03 | End: 2021-12-08 | Stop reason: HOSPADM

## 2021-12-03 RX ORDER — PANTOPRAZOLE SODIUM 40 MG/1
40 TABLET, DELAYED RELEASE ORAL
Status: DISCONTINUED | OUTPATIENT
Start: 2021-12-03 | End: 2021-12-08 | Stop reason: HOSPADM

## 2021-12-03 RX ORDER — MECLIZINE HCL 12.5 MG 12.5 MG/1
12.5 TABLET ORAL
COMMUNITY

## 2021-12-03 RX ORDER — SODIUM CHLORIDE 0.9 % (FLUSH) 0.9 %
5-40 SYRINGE (ML) INJECTION AS NEEDED
Status: DISCONTINUED | OUTPATIENT
Start: 2021-12-03 | End: 2021-12-08 | Stop reason: HOSPADM

## 2021-12-03 RX ORDER — SODIUM CHLORIDE, SODIUM LACTATE, POTASSIUM CHLORIDE, CALCIUM CHLORIDE 600; 310; 30; 20 MG/100ML; MG/100ML; MG/100ML; MG/100ML
75 INJECTION, SOLUTION INTRAVENOUS CONTINUOUS
Status: DISCONTINUED | OUTPATIENT
Start: 2021-12-03 | End: 2021-12-06

## 2021-12-03 RX ORDER — GUAIFENESIN 100 MG/5ML
81 LIQUID (ML) ORAL DAILY
COMMUNITY

## 2021-12-03 RX ORDER — ONDANSETRON 2 MG/ML
4 INJECTION INTRAMUSCULAR; INTRAVENOUS
Status: DISCONTINUED | OUTPATIENT
Start: 2021-12-03 | End: 2021-12-08 | Stop reason: HOSPADM

## 2021-12-03 RX ORDER — LANSOPRAZOLE 30 MG/1
30 CAPSULE, DELAYED RELEASE ORAL
COMMUNITY

## 2021-12-03 RX ORDER — ACETAMINOPHEN 325 MG/1
650 TABLET ORAL
Status: DISCONTINUED | OUTPATIENT
Start: 2021-12-03 | End: 2021-12-08 | Stop reason: HOSPADM

## 2021-12-03 RX ADMIN — TICAGRELOR 45 MG: 90 TABLET ORAL at 05:25

## 2021-12-03 RX ADMIN — WATER 1 G: 1 INJECTION INTRAMUSCULAR; INTRAVENOUS; SUBCUTANEOUS at 02:52

## 2021-12-03 RX ADMIN — POTASSIUM CHLORIDE 40 MEQ: 750 TABLET, FILM COATED, EXTENDED RELEASE ORAL at 09:15

## 2021-12-03 RX ADMIN — BUPROPION HYDROCHLORIDE 150 MG: 150 TABLET, EXTENDED RELEASE ORAL at 09:37

## 2021-12-03 RX ADMIN — Medication 1 CAPSULE: at 09:17

## 2021-12-03 RX ADMIN — PIPERACILLIN AND TAZOBACTAM 3.38 G: 3; .375 INJECTION, POWDER, LYOPHILIZED, FOR SOLUTION INTRAVENOUS at 00:05

## 2021-12-03 RX ADMIN — ACETAMINOPHEN 650 MG: 325 TABLET ORAL at 09:17

## 2021-12-03 RX ADMIN — DOXYCYCLINE 100 MG: 100 INJECTION, POWDER, LYOPHILIZED, FOR SOLUTION INTRAVENOUS at 02:52

## 2021-12-03 RX ADMIN — ACETAMINOPHEN 650 MG: 325 TABLET ORAL at 19:01

## 2021-12-03 RX ADMIN — PANTOPRAZOLE SODIUM 40 MG: 40 TABLET, DELAYED RELEASE ORAL at 09:16

## 2021-12-03 RX ADMIN — Medication 10 ML: at 02:53

## 2021-12-03 RX ADMIN — IOPAMIDOL 80 ML: 755 INJECTION, SOLUTION INTRAVENOUS at 08:52

## 2021-12-03 RX ADMIN — Medication 10 ML: at 14:25

## 2021-12-03 RX ADMIN — ASPIRIN 81 MG: 81 TABLET, COATED ORAL at 09:16

## 2021-12-03 RX ADMIN — DOXYCYCLINE 100 MG: 100 INJECTION, POWDER, LYOPHILIZED, FOR SOLUTION INTRAVENOUS at 14:24

## 2021-12-03 RX ADMIN — TICAGRELOR 45 MG: 90 TABLET ORAL at 18:24

## 2021-12-03 RX ADMIN — SODIUM CHLORIDE, POTASSIUM CHLORIDE, SODIUM LACTATE AND CALCIUM CHLORIDE 75 ML/HR: 600; 310; 30; 20 INJECTION, SOLUTION INTRAVENOUS at 02:53

## 2021-12-03 RX ADMIN — SODIUM CHLORIDE, POTASSIUM CHLORIDE, SODIUM LACTATE AND CALCIUM CHLORIDE 75 ML/HR: 600; 310; 30; 20 INJECTION, SOLUTION INTRAVENOUS at 19:03

## 2021-12-03 RX ADMIN — Medication 10 ML: at 21:40

## 2021-12-03 RX ADMIN — Medication 10 ML: at 05:25

## 2021-12-03 NOTE — PROGRESS NOTES
12/3/2021  12:29 PM  Case management note    Reason for Admission:  Orthostatic hypotension    Patient came to hospital for n/V x 1 month with a near syncopal event. Patient normally lives alone and drives. She is currently staying with daughter who is nurse her. Patient uses a walker for ambulation. She has history of HLD, HTN, depression, CAD and CVA. [facility] @ [facility]                       RUR Score:          12%           Plan for utilizing home health:      PT/OT to eval    PCP: First and Last name:  Zi Partida MD     Name of Practice:    Are you a current patient: Yes/No: yes   Approximate date of last visit:    Can you participate in a virtual visit with your PCP:                     Current Advanced Directive/Advance Care Plan: Full Code AD, not on file      Healthcare Decision Maker:                Primary Decision Maker: Garo Pedro - Daughter - [phone]                  Transition of Care Plan:         1. Home with family assistance       2. PCP follow up  3. AD to be brought in  4. CM to follow for discharge needs    Care Management Interventions  PCP Verified by CM:  Yes (Dr. Sahara Larson)  Mode of Transport at Discharge: Hyacinth Gomez 62: Child(mayra)  Confirm Follow Up Transport: Family  Discharge Location  Discharge Placement: Home with family assistance  Thereasa Castleman

## 2021-12-03 NOTE — ED NOTES
[address] witnessed/unwitnessed fall occurred on 12/3/21 (Date) at 2864 (Time). The answers to the following questions summarize the fall: In the patient's own words:  · What were you attempting to do when you fell? Get back in bed  · Do you know why you fell? Yes, got dizzy  · Do you have any pain/discomfort or any other complaints? NO  · Which part of your body made contact with the floor or other object? MY BOTTOM  Nurse:  Varghese Partida Was this an assisted fall? yes   Was fall witnessed? Yes     By Whom? Lurdes Vásquez    If witnessed, what part of the body made contact with the floor or other object? BOTTOM   Patients mental status after the fall/when found: Alert and oriented   Any apparent injury:  No apparent injury   Immediate interventions for injury/suspected injury? No interventions needed   Patient assisted back to bed? Assist X2   Name of provider notified and time, any comments? Chapis Peters, [address] [place] [address] Name of family member notified and time: Shimon George (daughter) [phone]    Document Immediate VS and physical assessment in flowsheets. Document Neuro assessment every hour x 4 (for potential head injury or unwitnessed fall) in flowsheets.         Catalina Hu RN

## 2021-12-03 NOTE — H&P
Ace Ryan [facility] 80  HISTORY AND PHYSICAL    Name:  Mireille Cordoba  MR#:  [id number]  :  1940  ACCOUNT #:  [de-identified]  ADMIT DATE:  2021      The patient was seen, evaluated, and admitted by me on 2021. PRIMARY CARE PHYSICIAN:  Jaquita Schwab, MD    SOURCE OF INFORMATION:  The patient and review of ED and old electronic medical record. CHIEF COMPLAINT:  Nausea and vomiting. HISTORY OF PRESENT ILLNESS:  This 80-year-old woman with past medical history significant for dyslipidemia, hypertension, depression, left carotid artery stenosis; status post stenting was in her usual state of health until about a month ago when the patient started experiencing nausea and vomiting as well as diarrhea. The symptoms are progressive according to the patient. These symptoms started shortly after she was admitted and treated for acute CVA. The patient was admitted to [facility] from 10/14/2021 to 10/23/2021. The patient was admitted and treated for acute CVA. During that hospitalization, the patient was found to have left carotid artery stenosis and underwent stenting. The patient stated that her symptoms started shortly after that. She has also been having dizziness which the patient described as room spinning around her. According to her, she is unsteady on her feet. She felt like she was going to fall down. She is having the feeling like she was drunk. On the day of her presentation at the emergency room, the patient developed syncope following episode of nausea, vomiting, and diarrhea and was brought to the emergency room for further evaluation. No seizure activity was reported. It was also reported that the patient's blood pressure was low. When the patient arrived at the emergency room, CT scan of the head was obtained and this was negative. The CTA of the head and neck was also performed which did not show any large vessel occlusion in the head or neck.   A left carotid stent is noted. The patient was subsequently referred to the hospitalist service for evaluation for admission. The chest x-ray shows right lower lobe infection. PAST MEDICAL HISTORY:  Hypertension, depression, dyslipidemia, left carotid artery stenosis; status post stenting. ALLERGIES:  NO KNOWN DRUG ALLERGIES. MEDICATIONS:  1. Aspirin 81 mg daily. .  2.  Wellbutrin 300 mg daily. 3.  Cymbalta 20 mg daily. 4.  Zetia 10 mg daily. 5.  Prevacid 30 mg daily. 6.  Brilanta 90 mg half tablet every 12 hours. FAMILY HISTORY:  This was reviewed. Her mother had hypertension, heart disease, and cancer; the type of cancer is not known. Father had hypertension. PAST SURGICAL HISTORY:  This is significant for appendectomy, cholecystectomy, lumbar laminectomy, and recent left carotid artery stenting. SOCIAL HISTORY:  The patient is a former smoker. No history of alcohol abuse. REVIEW OF SYSTEMS:  HEAD, EYES, EARS, NOSE AND THROAT:  This is positive for dizziness. No headache, no blurring of vision, and no photophobia. RESPIRATORY SYSTEM:  No cough, no shortness of breath, and no hemoptysis. CARDIOVASCULAR SYSTEM:  No chest pain, no orthopnea, and no palpitation. GASTROINTESTINAL SYSTEM:  This is positive for nausea, vomiting, and diarrhea. No constipation. GENITOURINARY SYSTEM:  No dysuria, no urgency, and no frequency. All other systems are reviewed and they are negative. PHYSICAL EXAMINATION:  GENERAL APPEARANCE:  The patient appeared ill and in moderate distress. VITAL SIGNS:  On arrival at the emergency room; temperature 97.5, pulse 87, respiratory rate 17, blood pressure 152/60, and oxygen saturation 94% on room air. HEAD:  Normocephalic, atraumatic. EYES:  Normal eye movement. No redness, no drainage, and no discharge. EARS:  Normal external ears with no obvious drainage. NOSE:  No deformity and no drainage. MOUTH AND THROAT:  No visible oral lesion.   Dry oral mucosa. NECK:  Neck is supple. No JVD and no thyromegaly. CHEST:  Clear breath sounds. No wheezing and no crackles. HEART:  Normal S1 and S2, regular. No clinically appreciable murmur. ABDOMEN:  Soft and nontender. Normal bowel sounds. CNS:  Alert and oriented x3. No gross focal neurological deficit. EXTREMITIES:  No edema. Pulses 2+ bilaterally. MUSCULOSKELETAL SYSTEM:  No obvious joint deformity or swelling. SKIN:  No active skin lesions seen in the exposed part of the body. PSYCHIATRY:  Normal mood and affect. LYMPHATIC SYSTEM:  No cervical lymphadenopathy. DIAGNOSTIC DATA:  The CTA of the head and neck, no large vessel occlusion in the head or neck. A left carotid stent is noted. The CT of the head without contrast, no acute intracranial abnormalities. The CT scan of the abdomen and pelvis with contrast, no acute abnormalities in the abdomen and pelvis. Right lower lobe airspace opacities suggesting nonspecific infection or inflammation. Chest x-ray shows right lower lobe infection or inflammation. LABORATORY DATA:  Hematology; WBC 7.0, hemoglobin 12.0, hematocrit 36.8, and platelets 542. Lactic acid level is 0.6. Troponin high sensitivity 9. Magnesium 2.1. Lipase 156. Chemistry; sodium 143, potassium 3.3, chloride 110, CO2 is 22, glucose 97, BUN 17, creatinine 1.13, calcium 8.3, total bilirubin 0.2, creatinine 1.13, calcium 8.3, total bilirubin 0.2, ALT 22, AST 22, alkaline phosphatase 140, total protein 6.8, albumin level 2.8, and globulin 4.0.    ASSESSMENT:  1. Syncope. 2.  Hypokalemia. 3.  Suspected bacterial pneumonia. 4.  Left carotid artery stenosis, status post stenting. 5.  Dyslipidemia. 6.  Intractable nausea and vomiting. 7.  Hypertension. 8.  Depression. PLAN:  1. Syncope. We will admit the patient for further evaluation and treatment. We will obtain MRI of the brain to evaluate the patient for stroke as a possible cause of syncope.   We will carry out orthostatic vital signs. We will check serial cardiac markers to rule out acute myocardial infarction as a possible cause of syncope. We will check D-dimer to evaluate the patient for thromboembolism as a possible cause of syncope. Echocardiogram was recently done during the evaluation for stroke and because of that we will not repeat the echocardiogram.  The patient will be closely monitored. 2.  Hypokalemia. We will replace potassium and repeat potassium level. 3.  Suspected bacterial pneumonia. We will start the patient on Rocephin and doxycycline. 4.  Left carotid artery stenosis, status post stenting. The CTA of the head and neck showed the stent is in good position. We will continue to monitor. 5.  Dyslipidemia. We will resume preadmission medication. 6.  Intractable nausea and vomiting. We will carry out supportive treatment. The CT of the abdomen and pelvis did not show any acute pathology. We will check lipase level. The patient may require Gastroenterology consult for further evaluation. 7.  Hypertension. The patient is not on any antihypertensive medication at home. We will continue to monitor the patient's blood pressure closely. 8.  Depression. We will resume preadmission medication. 9.  Other issues. Code status, the patient is a full code. We will place the patient on Lovenox for DVT prophylaxis. FUNCTIONAL STATUS PRIOR TO ADMISSION:  The patient came from home. The patient is ambulatory with no assistive device. COVID PRECAUTION:  The patient was wearing a face mask. I was wearing a face mask and gloves for this patient's encounter.       Quin Larios MD      [id number]  D:  12/03/2021 5:08  T:  12/03/2021 6:35  JOB #:  [id number]  CC:  Jaquita Schwab, MD

## 2021-12-03 NOTE — PROGRESS NOTES
[facility]: MED RECONCILIATION    Comments/Recommendations:   Prior to admission medication list updated per telephone conversation with patient in ER room 3. Patient could not recall the name of the eye drops she uses at bedtime. Eye drops were clarified per telephone conversation with patient's daughter, Jaron Rosa. Patient's daughter would prefer new prescriptions be sent to the [facility] on [address] since this is close to her house and she plans to have patient stay with her upon discharge. Restless Leg Syndrome: patient complaining of restless legs. Patient has been using over-the-counter medication. Patient's daughter can bring medication to hospital if needed. Medications added:     Amlodipine 2.5 mg by mouth daily  Repatha injection subcutaneously every 14 days (next due on 12/09/2021)  Latanoprost ophthalmic 1 drop both eyes at bedtime  Over-the-counter medication for Restless Leg Syndrome  Over-the-counter MVI tablet by mouth daily  Over-the-counter B12 tablet by mouth daily    Medications removed:    Duloxetine 20 mg daily -  patient says she is no longer taking this. Daughter confirmed that patient no longer takes. Ezetimibe 10 mg daily - patient no longer taking this. She only takes Repatha. Medications adjusted:    Bupropion  mg by mouth daily    Allergies: Patient has no known allergies. Prior to Admission Medications   Prescriptions Last Dose Informant Patient Reported? Taking? OTHER,NON-FORMULARY,  Self Yes Yes   Sig: Take  by mouth nightly. Over-the-counter medication for Restless Legs Syndrome   acetaminophen (TYLENOL) 325 mg tablet  Self Yes Yes   Sig: Take 650 mg by mouth every six (6) hours as needed (headache). amLODIPine (NORVASC) 2.5 mg tablet 12/2/2021 at Unknown time Self Yes Yes   Sig: Take 2.5 mg by mouth daily. aspirin 81 mg chewable tablet 12/2/2021 at Unknown time Self Yes Yes   Sig: Take 81 mg by mouth daily.    buPROPion XL (WELLBUTRIN XL) 150 mg tablet 2021 at Unknown time Self Yes Yes   Sig: Take 150 mg by mouth daily. cyanocobalamin 1,000 mcg tablet 2021 at Unknown time Self Yes Yes   Sig: Take 1,000 mcg by mouth daily. evolocumab (REPATHA SURECLICK) pen injection  Self Yes Yes   Si mg by SubCUTAneous route every fourteen (14) days. lansoprazole (PREVACID) 30 mg capsule 2021 at Unknown time Self Yes Yes   Sig: Take 30 mg by mouth Daily (before breakfast). latanoprost (XALATAN) 0.005 % ophthalmic solution  Self Yes Yes   Sig: Administer 1 Drop to both eyes nightly. therapeutic multivitamin (THERAGRAN) tablet 2021 at Unknown time Self Yes Yes   Sig: Take 1 Tablet by mouth daily. ticagrelor (BRILINTA) 90 mg tablet 2021 at Unknown time Self No Yes   Sig: Take 0.5 Tablets by mouth every twelve (12) hours every twelve (12) hours.         Lilo Wynne, Pharmacist

## 2021-12-03 NOTE — PROGRESS NOTES
1645: TRANSFER - IN REPORT:    Verbal report received from Isabell Meadows RN(name) on Tracie Dave  being received from ED(unit) for routine progression of care      Report consisted of patients Situation, Background, Assessment and   Recommendations(SBAR). Information from the following report(s) SBAR, Kardex, Accordion and Cardiac Rhythm NSR was reviewed with the receiving nurse. Opportunity for questions and clarification was provided. Assessment completed upon patients arrival to unit and care assumed. This patient was assisted with Intentional Toileting every 2 hours during this shift as appropriate. Documentation of ambulation and output reflected on Flowsheet as appropriate. Purposeful hourly rounding was completed using AIDET and 5Ps. Outcomes of PHR documented as they occurred. Bed alarm in use as appropriate. Dual Suction and ambubag in place.

## 2021-12-03 NOTE — PROGRESS NOTES
TRANSFER - OUT REPORT:    Verbal report given to Jessica Cho RN (name) on Claudell Maid  being transferred to [facility] (unit) for routine progression of care       Report consisted of patients Situation, Background, Assessment and   Recommendations(SBAR). Information from the following report(s) SBAR, Kardex, MAR, Accordion and Recent Results was reviewed with the receiving nurse. Lines:   Peripheral IV 12/02/21 Right Forearm (Active)   Site Assessment Ecchymotic (bruised) 12/03/21 1115   Phlebitis Assessment 0 12/03/21 1115   Infiltration Assessment 0 12/03/21 1115   Dressing Status Clean, dry, & intact 12/03/21 1115   Dressing Type Transparent 12/03/21 1115   Hub Color/Line Status Blue; Flushed 12/03/21 1115   Action Taken Open ports on tubing capped 12/03/21 1115   Alcohol Cap Used Yes 12/03/21 1115       Peripheral IV 12/03/21 Left; Posterior Hand (Active)   Site Assessment Clean, dry, & intact 12/03/21 1115   Phlebitis Assessment 0 12/03/21 1115   Infiltration Assessment 0 12/03/21 1115   Dressing Status Clean, dry, & intact 12/03/21 1115   Dressing Type Transparent 12/03/21 1115   Hub Color/Line Status Blue; Flushed 12/03/21 1115   Action Taken Open ports on tubing capped 12/03/21 1115   Alcohol Cap Used Yes 12/03/21 1115        Opportunity for questions and clarification was provided.       Patient transported [address]

## 2021-12-03 NOTE — ED NOTES
I helped pt to the bedside commode around 0650, pt got dizzy whiles trying to get back in the bed so pt was assist to the floor. No injury occurred. Pt was helped back in the bed with the help of a nursing assistance. Pt is alert and oriented x4, vitals post fall documented in flowssheet. Prior to the fall, pt had non slip socks on. Dr and family notified.

## 2021-12-03 NOTE — ED PROVIDER NOTES
81-year-old female with a history of prior CVA without significant residual deficits, with history of left carotid stenting in November, presents to the emergency department noting about a month and a half history of intermittent nausea, vomiting, diarrhea that seems to wax and wane since her procedure. She denies any history of prior GI issues or IBD. She states that she seems to have good days and bad but frequently has nausea and vomiting. She status post cholecystectomy and appendectomy. Today she had an episode of nausea, vomiting, diarrhea which was followed by a syncopal episode causing her to fall to the ground. No witnessed seizure activity. EMS was called as initial evaluation by family member was concerning for cardiac arrest as she could not find a pulse however on their evaluation they found her to be GCS 15 with normal vital signs. She was found to have a significant drop in her blood pressure when she stood up however in suspected orthostatic hypotension and syncope. The patient denied any preceding chest pain or shortness of breath. She denies any fever, chills, cough, URI symptoms, dysuria, hematuria. She states that her abdomen feels a bit sore from vomiting but denies any significant abdominal pain. She reportedly has only taken Zofran once over the course of the symptoms but she did not like the taste and she felt like it made her symptoms worse that she has not taken it since. She denies any headache, numbness, weakness, vision changes, difficulty speaking or any other new focal neuro deficit. Patient's family states that she basically was just laying in bed all day because she was not feeling well. She is vaccinated against COVID-19. Denies any known sick contacts.            Past Medical History:   Diagnosis Date    GERD (gastroesophageal reflux disease)     Hiatal hernia     Hypertension     Nausea & vomiting     Unspecified adverse effect of anesthesia 9-2009    prolonged sedation       Past Surgical History:   Procedure Laterality Date    HX APPENDECTOMY  child, open    HX CHOLECYSTECTOMY  2012    HX ORTHOPAEDIC  2009    lumbar gonzalez.         Family History:   Problem Relation Age of Onset    Hypertension Mother     Heart Disease Mother     Cancer Mother     Hypertension Father        Social History     Socioeconomic History    Marital status:      Spouse name: Not on file    Number of children: Not on file    Years of education: Not on file    Highest education level: Not on file   Occupational History    Not on file   Tobacco Use    Smoking status: Former Smoker     Packs/day: 1.00     Quit date: 11/28/2011     Years since quitting: 10.0    Smokeless tobacco: Not on file   Substance and Sexual Activity    Alcohol use: Not on file     Comment: 2 drinks per month    Drug use: Not on file    Sexual activity: Not on file   Other Topics Concern     Service Not Asked    Blood Transfusions Not Asked    Caffeine Concern Not Asked    Occupational Exposure Not Asked    Hobby Hazards Not Asked    Sleep Concern Not Asked    Stress Concern Not Asked    Weight Concern Not Asked    Special Diet Not Asked    Back Care Not Asked    Exercise Not Asked    Bike Helmet Not Asked   [address] Not Asked    Self-Exams Not Asked   Social History Narrative    Not on file     Social Determinants of Health     Financial Resource Strain:     Difficulty of Paying Living Expenses: Not on file   Food Insecurity:     Worried About Running Out of Food in the Last Year: Not on file    Amarjit of Food in the Last Year: Not on file   Transportation Needs:     Lack of Transportation (Medical): Not on file    Lack of Transportation (Non-Medical):  Not on file   Physical Activity:     Days of Exercise per Week: Not on file    Minutes of Exercise per Session: Not on file   Stress:     Feeling of Stress : Not on file   Social Connections:     Frequency of Communication with Friends and Family: Not on file    Frequency of Social Gatherings with Friends and Family: Not on file    Attends Alevism Services: Not on file    Active Member of Clubs or Organizations: Not on file    Attends Club or Organization Meetings: Not on file    Marital Status: Not on file   Intimate Partner Violence:     Fear of Current or Ex-Partner: Not on file    Emotionally Abused: Not on file    Physically Abused: Not on file    Sexually Abused: Not on file   Housing Stability:     Unable to Pay for Housing in the Last Year: Not on file    Number of [place] in the Last Year: Not on file    Unstable Housing in the Last Year: Not on file         ALLERGIES: Patient has no known allergies. Review of Systems   Constitutional: Positive for activity change and appetite change. Negative for chills and fever. HENT: Negative for congestion, rhinorrhea, sinus pressure, sneezing and sore throat. Eyes: Negative for photophobia and visual disturbance. Respiratory: Negative for cough and shortness of breath. Cardiovascular: Negative for chest pain. Gastrointestinal: Positive for abdominal pain, diarrhea, nausea and vomiting. Negative for blood in stool and constipation. Genitourinary: Negative for difficulty urinating, dysuria, flank pain, frequency, hematuria, menstrual problem, urgency, vaginal bleeding and vaginal discharge. Musculoskeletal: Negative for arthralgias, back pain, myalgias and neck pain. Skin: Negative for rash and wound. Neurological: Positive for syncope. Negative for weakness, numbness and headaches. Psychiatric/Behavioral: Negative for self-injury and suicidal ideas. All other systems reviewed and are negative.       Vitals:    12/02/21 2123 12/02/21 2202 12/02/21 2204   BP: (!) 152/60 (!) 140/87    Pulse: 87 89 86   Resp: 17 22 18   Temp: 97.5 °F (36.4 °C) 98.2 °F (36.8 °C) 98.2 °F (36.8 °C)   SpO2: 94% 95% 96%   Weight: 68 kg (150 lb)     Height: 5' 4\" (1.626 m)              Physical Exam  Vitals and nursing note reviewed. Constitutional:       General: She is not in acute distress. Appearance: Normal appearance. She is well-developed. She is not diaphoretic. HENT:      Head: Normocephalic and atraumatic. Nose: Nose normal.   Eyes:      Extraocular Movements: Extraocular movements intact. Conjunctiva/sclera: Conjunctivae normal.      Pupils: Pupils are equal, round, and reactive to light. Cardiovascular:      Rate and Rhythm: Normal rate and regular rhythm. Heart sounds: Normal heart sounds. Pulmonary:      Effort: Pulmonary effort is normal.      Breath sounds: Normal breath sounds. Abdominal:      General: There is no distension. Palpations: Abdomen is soft. Tenderness: There is generalized abdominal tenderness (mild). There is no right CVA tenderness, left CVA tenderness, guarding or rebound. Musculoskeletal:         General: No tenderness. Cervical back: Neck supple. Skin:     General: Skin is warm and dry. Neurological:      General: No focal deficit present. Mental Status: She is alert and oriented to person, place, and time. Cranial Nerves: No cranial nerve deficit. Sensory: No sensory deficit. Motor: No weakness. Coordination: Coordination normal.      Comments: Intact sensation, 5/5 strength in all 4 extremities, intact finger to nose, neg pronator drift, fluent speech, CN intact. MDM   15-year-old female presents with persistent nausea, vomiting, diarrhea for several weeks now precipitating syncope and orthostatic hypotension concerning for dehydration. On exam in the ED she is afebrile with vital signs stable no acute distress. Neuro intact, as above. Labs returned showing no significant abnormalities. Creatinine 1.13, similar to prior measures. Normal lactic acid.     Chest x-ray was viewed by myself and read by radiology showing right lower lobe infection, given history suspicious for aspiration pneumonia. CT abdomen pelvis shows no acute intra-abdominal abnormality, but also shows the right lower lobe air space opacity. CT head shows no acute intracranial abnormalities. 931 EKG shows normal sinus rhythm with a rate of 86 bpm with no acute ST or T wave abnormalities suggestive of ischemia. We'll give IV ABX to cover for aspiration pneumonia. Will admit to the hospitalist service for further evaluation given persistent nausea, vomiting, to the point where she is having syncopal episodes, orthostatic hypotension, and aspiration event. This plan was discussed with the patient and her family at the bedside and they stated both understanding and agreement. Procedures    Perfect Serve Consult for Admission  11:44 PM    ED Room Number: ER03/03  Patient Name and age:  Coleen Roles [de-identified] y.o.  female  Working Diagnosis:   1. Nausea vomiting and diarrhea    2. Orthostatic hypotension    3. Syncope and collapse    4. Aspiration pneumonia of right lower lobe due to vomit ([address]        COVID-19 Suspicion:  no  Sepsis present:  no  Reassessment needed: no  Code Status:  Do Not Resuscitate  Readmission: no  Isolation Requirements:  no  Recommended Level of Care:  telemetry  Department:[facility] ED - [phone]  Other: 23-year-old female presents with frequent nausea, vomiting, diarrhea for the last month and a half worsening over the last couple of days leading to syncopal episode and orthostatic hypotension. Appears to have aspirated with RLL PNA. Getting IV ABX, IV Zofran, IV fluid bolus.

## 2021-12-03 NOTE — ED TRIAGE NOTES
Pt arrives with complaint of orthostatic hypotension with near syncope resolved in supine postion. Pt also reports N/V for 1 month. Recent aortic stent placed at [facility].

## 2021-12-04 ENCOUNTER — APPOINTMENT (OUTPATIENT)
Dept: VASCULAR SURGERY | Age: 81
DRG: 312 | End: 2021-12-04
Attending: FAMILY MEDICINE
Payer: MEDICARE

## 2021-12-04 PROBLEM — R78.81 BACTEREMIA: Status: ACTIVE | Noted: 2021-12-04

## 2021-12-04 LAB
ANION GAP SERPL CALC-SCNC: 8 MMOL/L (ref 5–15)
BASOPHILS # BLD: 0 K/UL (ref 0–0.1)
BASOPHILS NFR BLD: 0 % (ref 0–1)
BUN SERPL-MCNC: 9 MG/DL (ref 6–20)
BUN/CREAT SERPL: 9 (ref 12–20)
CALCIUM SERPL-MCNC: 8.7 MG/DL (ref 8.5–10.1)
CHLORIDE SERPL-SCNC: 111 MMOL/L (ref 97–108)
CO2 SERPL-SCNC: 23 MMOL/L (ref 21–32)
CREAT SERPL-MCNC: 0.96 MG/DL (ref 0.55–1.02)
DIFFERENTIAL METHOD BLD: NORMAL
EOSINOPHIL # BLD: 0.2 K/UL (ref 0–0.4)
EOSINOPHIL NFR BLD: 3 % (ref 0–7)
ERYTHROCYTE [DISTWIDTH] IN BLOOD BY AUTOMATED COUNT: 13.4 % (ref 11.5–14.5)
GLUCOSE SERPL-MCNC: 96 MG/DL (ref 65–100)
HCT VFR BLD AUTO: 35.3 % (ref 35–47)
HGB BLD-MCNC: 11.6 G/DL (ref 11.5–16)
IMM GRANULOCYTES # BLD AUTO: 0 K/UL (ref 0–0.04)
IMM GRANULOCYTES NFR BLD AUTO: 0 % (ref 0–0.5)
LYMPHOCYTES # BLD: 1.2 K/UL (ref 0.8–3.5)
LYMPHOCYTES NFR BLD: 20 % (ref 12–49)
MCH RBC QN AUTO: 29.4 PG (ref 26–34)
MCHC RBC AUTO-ENTMCNC: 32.9 G/DL (ref 30–36.5)
MCV RBC AUTO: 89.6 FL (ref 80–99)
MONOCYTES # BLD: 0.5 K/UL (ref 0–1)
MONOCYTES NFR BLD: 9 % (ref 5–13)
NEUTS SEG # BLD: 4.1 K/UL (ref 1.8–8)
NEUTS SEG NFR BLD: 68 % (ref 32–75)
NRBC # BLD: 0 K/UL (ref 0–0.01)
NRBC BLD-RTO: 0 PER 100 WBC
PLATELET # BLD AUTO: 249 K/UL (ref 150–400)
PMV BLD AUTO: 10 FL (ref 8.9–12.9)
POTASSIUM SERPL-SCNC: 3.8 MMOL/L (ref 3.5–5.1)
PROCALCITONIN SERPL-MCNC: <0.05 NG/ML
RBC # BLD AUTO: 3.94 M/UL (ref 3.8–5.2)
SODIUM SERPL-SCNC: 142 MMOL/L (ref 136–145)
WBC # BLD AUTO: 6.1 K/UL (ref 3.6–11)

## 2021-12-04 PROCEDURE — 80048 BASIC METABOLIC PNL TOTAL CA: CPT

## 2021-12-04 PROCEDURE — 74011000250 HC RX REV CODE- 250: Performed by: INTERNAL MEDICINE

## 2021-12-04 PROCEDURE — 84145 PROCALCITONIN (PCT): CPT

## 2021-12-04 PROCEDURE — 65660000000 HC RM CCU STEPDOWN

## 2021-12-04 PROCEDURE — 85025 COMPLETE CBC W/AUTO DIFF WBC: CPT

## 2021-12-04 PROCEDURE — 74011250636 HC RX REV CODE- 250/636: Performed by: INTERNAL MEDICINE

## 2021-12-04 PROCEDURE — 36415 COLL VENOUS BLD VENIPUNCTURE: CPT

## 2021-12-04 PROCEDURE — 74011250637 HC RX REV CODE- 250/637: Performed by: INTERNAL MEDICINE

## 2021-12-04 PROCEDURE — 94760 N-INVAS EAR/PLS OXIMETRY 1: CPT

## 2021-12-04 PROCEDURE — 74011000258 HC RX REV CODE- 258: Performed by: INTERNAL MEDICINE

## 2021-12-04 RX ADMIN — ACETAMINOPHEN 650 MG: 325 TABLET ORAL at 12:45

## 2021-12-04 RX ADMIN — WATER 1 G: 1 INJECTION INTRAMUSCULAR; INTRAVENOUS; SUBCUTANEOUS at 02:25

## 2021-12-04 RX ADMIN — TICAGRELOR 45 MG: 90 TABLET ORAL at 19:00

## 2021-12-04 RX ADMIN — SODIUM CHLORIDE, POTASSIUM CHLORIDE, SODIUM LACTATE AND CALCIUM CHLORIDE 75 ML/HR: 600; 310; 30; 20 INJECTION, SOLUTION INTRAVENOUS at 12:44

## 2021-12-04 RX ADMIN — DOXYCYCLINE 100 MG: 100 INJECTION, POWDER, LYOPHILIZED, FOR SOLUTION INTRAVENOUS at 02:25

## 2021-12-04 RX ADMIN — DOXYCYCLINE 100 MG: 100 INJECTION, POWDER, LYOPHILIZED, FOR SOLUTION INTRAVENOUS at 15:20

## 2021-12-04 RX ADMIN — ACETAMINOPHEN 650 MG: 325 TABLET ORAL at 19:34

## 2021-12-04 RX ADMIN — Medication 10 ML: at 15:21

## 2021-12-04 RX ADMIN — ASPIRIN 81 MG: 81 TABLET, COATED ORAL at 09:30

## 2021-12-04 RX ADMIN — ONDANSETRON 4 MG: 4 TABLET, ORALLY DISINTEGRATING ORAL at 02:49

## 2021-12-04 RX ADMIN — PANTOPRAZOLE SODIUM 40 MG: 40 TABLET, DELAYED RELEASE ORAL at 06:52

## 2021-12-04 RX ADMIN — ACETAMINOPHEN 650 MG: 325 TABLET ORAL at 03:01

## 2021-12-04 RX ADMIN — TICAGRELOR 45 MG: 90 TABLET ORAL at 06:52

## 2021-12-04 RX ADMIN — Medication 10 ML: at 06:52

## 2021-12-04 RX ADMIN — BUPROPION HYDROCHLORIDE 150 MG: 150 TABLET, EXTENDED RELEASE ORAL at 09:30

## 2021-12-04 RX ADMIN — ENOXAPARIN SODIUM 40 MG: 40 INJECTION SUBCUTANEOUS at 09:31

## 2021-12-04 RX ADMIN — Medication 1 CAPSULE: at 09:30

## 2021-12-04 NOTE — PROGRESS NOTES
ID consulted for GPC in blood 1/4 bottles from 12/3/21. Chart reviewed. Discussed with Dr. Shivani Terry. [de-identified] yo with recent acute CVA, admitted for syncope. Afebrile and HDS. Recommendations: Follow up speciation and susceptibilities   Empiric vancomycin to start. On abx for CAP per primary team. Given recent hospital course for acute CVA in 10/2021, could be HCAP as well. Continue to monitor. Follow up blood cultures ordered. Might need TTE depending upon how high grade the bacteremia is and if Staph aureus in blood. D/w Dr. Shivani Terry. Will follow. Call with Lonny.          Elio Mercdao MD  Infectious Diseases

## 2021-12-04 NOTE — PROGRESS NOTES
Progress Note  Date:2021       Room:[facility]  Patient Jonathan Masters     YOB: 1940     Age:81 y.o. Subjective    Subjective:  Symptoms:  Stable. No shortness of breath or chest pain. Diet:  No nausea or vomiting. Pain:  She reports no pain. Review of Systems   Constitutional: Negative. Negative for fever. HENT: Negative. Eyes: Negative. Respiratory: Negative for shortness of breath. Cardiovascular: Negative for chest pain. Gastrointestinal: Negative for abdominal pain, nausea and vomiting. Endocrine: Negative. Genitourinary: Negative. Musculoskeletal: Negative. Neurological: Positive for syncope. Hematological: Negative. Psychiatric/Behavioral: Negative. Objective         Vitals Last 24 Hours:  TEMPERATURE:  Temp  Av.5 °F (36.9 °C)  Min: 98.1 °F (36.7 °C)  Max: 99.4 °F (37.4 °C)  RESPIRATIONS RANGE: Resp  Av.7  Min: 14  Max: 19  PULSE OXIMETRY RANGE: SpO2  Av.8 %  Min: 91 %  Max: 98 %  PULSE RANGE: Pulse  Av.3  Min: 82  Max: 100  BLOOD PRESSURE RANGE: Systolic (72UWS), EOJ:384 , Min:123 , LIY:901   ; Diastolic (83DPI), TEP:28, Min:57, Max:75    I/O (24Hr): Intake/Output Summary (Last 24 hours) at 2021 1637  Last data filed at 2021 1219  Gross per 24 hour   Intake 360 ml   Output --   Net 360 ml     Objective:  General Appearance:  Comfortable. Vital signs: (most recent): Blood pressure (!) 141/72, pulse 86, temperature 98.5 °F (36.9 °C), resp. rate 18, height 5' 4\" (1.626 m), weight 68.2 kg (150 lb 5.7 oz), SpO2 96 %. Vital signs are normal.  No fever. Lungs:  Normal effort and normal respiratory rate. Breath sounds clear to auscultation. Heart: Normal rate. Regular rhythm. Abdomen: Abdomen is soft. Bowel sounds are normal.     Extremities: There is no deformity. Neurological: Patient is oriented to person, place and time. (Right hemiparesis ).     Skin: Warm.      Labs/Imaging/Diagnostics    Labs:  CBC:  Recent Labs     12/04/21 0254 12/03/21 0242 12/02/21 2150   WBC 6.1 5.1 7.0   RBC 3.94 3.73* 4.09   HGB 11.6 11.2* 12.0   HCT 35.3 34.4* 36.8   MCV 89.6 92.2 90.0   RDW 13.4 13.3 13.2    266 272     CHEMISTRIES:  Recent Labs     12/04/21 0254 12/03/21 0242 12/02/21 2150     --  143   K 3.8  --  3.3*   *  --  110*   CO2 23  --  22   BUN 9  --  17   CA 8.7  --  8.3*   PHOS  --  3.1  --    MG  --   --  2.1   PT/INR:No results for input(s): INR, INREXT, INREXT in the last 72 hours. No lab exists for component: PROTIME  APTT:No results for input(s): APTT in the last 72 hours. LIVER PROFILE:  Recent Labs     12/02/21 2150   AST 22   ALT 22     Lab Results   Component Value Date/Time    ALT (SGPT) 22 12/02/2021 09:50 PM    AST (SGOT) 22 12/02/2021 09:50 PM    Alk. phosphatase 140 (H) 12/02/2021 09:50 PM    Bilirubin, total 0.2 12/02/2021 09:50 PM       Imaging Last 24 Hours:  No results found. Assessment//Plan   Principal Problem:    Syncope (12/3/2021)    Active Problems:    Bacteremia (12/4/2021)        Syncope   - patient had 3 episodes of syncope , witnessed prior to admission  - CT head reviewed, CTA head and neck 12/2 reviewed , no vessel occlusion  - orthostatic vitals normal   - Possible cardiac in origin  - ECHO pending   - PT eval   - OP 30 day monitor  per cardiology  - Cardiology consulted    Possible Pneumonia    - CTA chest reviewed . Continue empiric Rocephin and Doxycycline   - Suppl O2 as needed     Bacteremia   - 1/4 bottles + GPC CoNS more likely skin contamination  - repeat blood culture for 12/5 ordered  - discontinue Vancomycin  - ID consulted .  Signed off    Nausea/Vomiting   - resolved   - Monitor electrolytes and replace     Elevated DDIMER  - CTA chest , no PE     Hypokalemia  - Resolved     Hypertension  - resume Norvasc home dose     Depression  - On Wellbutrin    Left Carotid artery stenosis , status post stenting  - on brilanta , zetia , aspirin     Disposition : Possible discharge tomorrow       Electronically signed by Bri Segovia MD on 12/5/2021 at 8:28 AM

## 2021-12-04 NOTE — PROGRESS NOTES
Bedside and Verbal shift change report given to Lawrence Aragon RN (oncoming nurse) by Lawrence Aragon RN (offgoing nurse). Report included the following information SBAR, Kardex, Intake/Output, MAR and Recent Results. This patient was assisted with Intentional Toileting every 2 hours during this shift as appropriate. Documentation of ambulation and output reflected on Flowsheet as appropriate. Purposeful hourly rounding was completed using AIDET and 5Ps. Outcomes of PHR documented as they occurred. Bed alarm in use as appropriate. Dual Suction and ambubag in place. Bedside and Verbal shift change report given to Lawrence Aragon RN (oncoming nurse) by Lawrence Aragon RN (offgoing nurse). Report included the following information SBAR, Kardex, Intake/Output, MAR and Recent Results.

## 2021-12-04 NOTE — PROGRESS NOTES
Notified by nurse of blood cultures 1/4 gram positive cocci,patient on vibramycin and ceftriaxone, no fever, no wbc. Will continue  Current abx regimen .

## 2021-12-05 PROCEDURE — 74011000250 HC RX REV CODE- 250: Performed by: INTERNAL MEDICINE

## 2021-12-05 PROCEDURE — 94760 N-INVAS EAR/PLS OXIMETRY 1: CPT

## 2021-12-05 PROCEDURE — 74011000258 HC RX REV CODE- 258: Performed by: INTERNAL MEDICINE

## 2021-12-05 PROCEDURE — 74011250636 HC RX REV CODE- 250/636: Performed by: INTERNAL MEDICINE

## 2021-12-05 PROCEDURE — 65660000000 HC RM CCU STEPDOWN

## 2021-12-05 PROCEDURE — 74011250637 HC RX REV CODE- 250/637: Performed by: INTERNAL MEDICINE

## 2021-12-05 PROCEDURE — 99222 1ST HOSP IP/OBS MODERATE 55: CPT | Performed by: INTERNAL MEDICINE

## 2021-12-05 RX ORDER — AMLODIPINE BESYLATE 2.5 MG/1
2.5 TABLET ORAL DAILY
Status: DISCONTINUED | OUTPATIENT
Start: 2021-12-06 | End: 2021-12-06

## 2021-12-05 RX ORDER — DOXYCYCLINE HYCLATE 100 MG
100 TABLET ORAL EVERY 12 HOURS
Status: COMPLETED | OUTPATIENT
Start: 2021-12-05 | End: 2021-12-07

## 2021-12-05 RX ADMIN — Medication 10 ML: at 06:46

## 2021-12-05 RX ADMIN — ACETAMINOPHEN 650 MG: 325 TABLET ORAL at 20:07

## 2021-12-05 RX ADMIN — PANTOPRAZOLE SODIUM 40 MG: 40 TABLET, DELAYED RELEASE ORAL at 06:46

## 2021-12-05 RX ADMIN — TICAGRELOR 45 MG: 90 TABLET ORAL at 06:46

## 2021-12-05 RX ADMIN — Medication 10 ML: at 20:08

## 2021-12-05 RX ADMIN — TICAGRELOR 45 MG: 90 TABLET ORAL at 17:07

## 2021-12-05 RX ADMIN — DOXYCYCLINE HYCLATE 100 MG: 100 TABLET, COATED ORAL at 17:07

## 2021-12-05 RX ADMIN — ONDANSETRON 4 MG: 2 INJECTION INTRAMUSCULAR; INTRAVENOUS at 11:15

## 2021-12-05 RX ADMIN — DOXYCYCLINE 100 MG: 100 INJECTION, POWDER, LYOPHILIZED, FOR SOLUTION INTRAVENOUS at 01:53

## 2021-12-05 RX ADMIN — SODIUM CHLORIDE, POTASSIUM CHLORIDE, SODIUM LACTATE AND CALCIUM CHLORIDE 75 ML/HR: 600; 310; 30; 20 INJECTION, SOLUTION INTRAVENOUS at 18:42

## 2021-12-05 RX ADMIN — Medication 10 ML: at 16:36

## 2021-12-05 RX ADMIN — ASPIRIN 81 MG: 81 TABLET, COATED ORAL at 08:30

## 2021-12-05 RX ADMIN — WATER 1 G: 1 INJECTION INTRAMUSCULAR; INTRAVENOUS; SUBCUTANEOUS at 01:53

## 2021-12-05 RX ADMIN — BUPROPION HYDROCHLORIDE 150 MG: 150 TABLET, EXTENDED RELEASE ORAL at 08:30

## 2021-12-05 RX ADMIN — Medication 1 CAPSULE: at 08:30

## 2021-12-05 NOTE — CONSULTS
HISTORY OF PRESENTING ILLNESS      Cathy Lopez is a [de-identified] y.o. female with history of recurrent syncope; neurological workup unrevealing thus far. Echo in 10/21 with preserved LV function. EKG/tele unremarkable.  Orthostatics normal.         ACTIVE PROBLEM LIST     Patient Active Problem List    Diagnosis Date Noted    Bacteremia 12/04/2021    Syncope 12/03/2021    Orthostatic hypotension 10/18/2021    CVA (cerebral vascular accident) ([facility] [address] 10/14/2021    Depression 10/25/2018    Acute hip pain, left 10/22/2018    Fracture of greater trochanter of left femur ([facility] [address] 10/22/2018           MEDICATIONS     Current Facility-Administered Medications   Medication Dose Route Frequency    aspirin delayed-release tablet 81 mg  81 mg Oral DAILY    pantoprazole (PROTONIX) tablet 40 mg  40 mg Oral ACB    ticagrelor (BRILINTA) tablet 45 mg  45 mg Oral Q12H    sodium chloride (NS) flush 5-40 mL  5-40 mL IntraVENous Q8H    sodium chloride (NS) flush 5-40 mL  5-40 mL IntraVENous PRN    acetaminophen (TYLENOL) tablet 650 mg  650 mg Oral Q6H PRN    Or    acetaminophen (TYLENOL) suppository 650 mg  650 mg Rectal Q6H PRN    polyethylene glycol (MIRALAX) packet 17 g  17 g Oral DAILY PRN    ondansetron (ZOFRAN ODT) tablet 4 mg  4 mg Oral Q8H PRN    Or    ondansetron (ZOFRAN) injection 4 mg  4 mg IntraVENous Q6H PRN    enoxaparin (LOVENOX) injection 40 mg  40 mg SubCUTAneous DAILY    L.acidophilus-paracasei-S.thermophil-bifidobacter (RISAQUAD) 8 billion cell capsule  1 Capsule Oral DAILY    doxycycline (VIBRAMYCIN) 100 mg in 0.9% sodium chloride (MBP/ADV) 100 mL MBP  100 mg IntraVENous Q12H    cefTRIAXone (ROCEPHIN) 1 g in sterile water (preservative free) 10 mL IV syringe  1 g IntraVENous Q24H    lactated Ringers infusion  75 mL/hr IntraVENous CONTINUOUS    buPROPion XL (WELLBUTRIN XL) tablet 150 mg  150 mg Oral DAILY           PAST MEDICAL HISTORY     Past Medical History:   Diagnosis Date    GERD (gastroesophageal reflux disease)     Hiatal hernia     Hypertension     Nausea & vomiting     Unspecified adverse effect of anesthesia 9-2009    prolonged sedation           PAST SURGICAL HISTORY     Past Surgical History:   Procedure Laterality Date    HX APPENDECTOMY  child, open    HX CHOLECYSTECTOMY  2012    HX ORTHOPAEDIC  2009    lumbar gonzalez.          ALLERGIES     No Known Allergies       FAMILY HISTORY     Family History   Problem Relation Age of Onset    Hypertension Mother     Heart Disease Mother     Cancer Mother     Hypertension Father     negative for cardiac disease       SOCIAL HISTORY     Social History     Socioeconomic History    Marital status:    Tobacco Use    Smoking status: Former Smoker     Packs/day: 1.00     Quit date: 11/28/2011     Years since quitting: 10.0         MEDICATIONS     Current Facility-Administered Medications   Medication Dose Route Frequency    aspirin delayed-release tablet 81 mg  81 mg Oral DAILY    pantoprazole (PROTONIX) tablet 40 mg  40 mg Oral ACB    ticagrelor (BRILINTA) tablet 45 mg  45 mg Oral Q12H    sodium chloride (NS) flush 5-40 mL  5-40 mL IntraVENous Q8H    sodium chloride (NS) flush 5-40 mL  5-40 mL IntraVENous PRN    acetaminophen (TYLENOL) tablet 650 mg  650 mg Oral Q6H PRN    Or    acetaminophen (TYLENOL) suppository 650 mg  650 mg Rectal Q6H PRN    polyethylene glycol (MIRALAX) packet 17 g  17 g Oral DAILY PRN    ondansetron (ZOFRAN ODT) tablet 4 mg  4 mg Oral Q8H PRN    Or    ondansetron (ZOFRAN) injection 4 mg  4 mg IntraVENous Q6H PRN    enoxaparin (LOVENOX) injection 40 mg  40 mg SubCUTAneous DAILY    L.acidophilus-paracasei-S.thermophil-bifidobacter (RISAQUAD) 8 billion cell capsule  1 Capsule Oral DAILY    doxycycline (VIBRAMYCIN) 100 mg in 0.9% sodium chloride (MBP/ADV) 100 mL MBP  100 mg IntraVENous Q12H    cefTRIAXone (ROCEPHIN) 1 g in sterile water (preservative free) 10 mL IV syringe  1 g IntraVENous Q24H    lactated Ringers infusion  75 mL/hr IntraVENous CONTINUOUS    buPROPion XL (WELLBUTRIN XL) tablet 150 mg  150 mg Oral DAILY       I have reviewed the nurses notes, vitals, problem list, allergy list, medical history, family, social history and medications. REVIEW OF SYMPTOMS      General: Pt denies excessive weight gain or loss. Pt is able to conduct ADL's  HEENT: Denies blurred vision, headaches, hearing loss, epistaxis and difficulty swallowing. Respiratory: Denies cough, congestion, shortness of breath, PIMENTEL, wheezing or stridor. Cardiovascular: Denies precordial pain, palpitations, edema or PND  Gastrointestinal: Denies poor appetite, indigestion, abdominal pain or blood in stool  Genitourinary: Denies hematuria, dysuria, increased urinary frequency  Musculoskeletal: Denies joint pain or swelling from muscles or joints  Neurologic: Denies tremor, paresthesias, headache, or sensory motor disturbance  Psychiatric: Denies confusion, insomnia, depression  Integumentray: Denies rash, itching or ulcers. Hematologic: Denies easy bruising, bleeding       PHYSICAL EXAMINATION      Vitals:    12/04/21 2317 12/05/21 0404 12/05/21 0643 12/05/21 0700   BP: 130/75 (!) 148/57 (!) 151/74    Pulse: 85 97 95 93   Resp: 17 16 18    Temp: 98.1 °F (36.7 °C) 98.5 °F (36.9 °C) 98.2 °F (36.8 °C)    SpO2: 96% 95% 94%    Weight:  165 lb (74.8 kg)     Height:         General: Well developed, in no acute distress. HEENT: No jaundice, oral mucosa moist, no oral ulcers  Neck: Supple, no stiffness, no lymphadenopathy, supple  Heart:  Normal S1/S2 negative S3 or S4. Regular, no murmur, gallop or rub, no jugular venous distention  Respiratory: Clear bilaterally x 4, no wheezing or rales  Abdomen:   Soft, non-tender, bowel sounds are active.   Extremities:  No edema, normal cap refill, no cyanosis.   Musculoskeletal: No clubbing, no deformities  Neuro: A&Ox3, speech clear, gait stable, cooperative, no focal neurologic deficits  Skin: Skin color is normal. No rashes or lesions. Non diaphoretic, moist.  Vascular: 2+ pulses symmetric in all extremities       DIAGNOSTIC DATA      EKG:SR with preserved intervals  TELEMETRY: SR     LABORATORY DATA      Lab Results   Component Value Date/Time    WBC 6.1 12/04/2021 02:54 AM    HGB 11.6 12/04/2021 02:54 AM    HCT 35.3 12/04/2021 02:54 AM    PLATELET 444 46/41/8601 02:54 AM    MCV 89.6 12/04/2021 02:54 AM      Lab Results   Component Value Date/Time    Sodium 142 12/04/2021 02:54 AM    Potassium 3.8 12/04/2021 02:54 AM    Chloride 111 (H) 12/04/2021 02:54 AM    CO2 23 12/04/2021 02:54 AM    Anion gap 8 12/04/2021 02:54 AM    Glucose 96 12/04/2021 02:54 AM    BUN 9 12/04/2021 02:54 AM    Creatinine 0.96 12/04/2021 02:54 AM    BUN/Creatinine ratio 9 (L) 12/04/2021 02:54 AM    GFR est AA >60 12/04/2021 02:54 AM    GFR est non-AA 56 (L) 12/04/2021 02:54 AM    Calcium 8.7 12/04/2021 02:54 AM    Bilirubin, total 0.2 12/02/2021 09:50 PM    Alk. phosphatase 140 (H) 12/02/2021 09:50 PM    Protein, total 6.8 12/02/2021 09:50 PM    Albumin 2.8 (L) 12/02/2021 09:50 PM    Globulin 4.0 12/02/2021 09:50 PM    A-G Ratio 0.7 (L) 12/02/2021 09:50 PM    ALT (SGPT) 22 12/02/2021 09:50 PM           ASSESSMENT      1. Syncope         PLAN     Plan for OP 30 day monitor        Thank you, Deanna Hurt MD for involving me in the care of this extraordinarily pleasant female. Please do not hesitate to contact me for further questions/concerns.          Tulio Warren MD  Cardiac Electrophysiology / Cardiology    [address]  [address] [facility], [facility], [address], [facility] JARRED Hamm, [address]  [phone] / [phone] Fax   [phone] / [phone] Fax

## 2021-12-05 NOTE — PROGRESS NOTES
Progress Note  Date:2021       Room:[facility]  Patient Kylah Jalloh     YOB: 1940     Age:81 y.o. Subjective    Subjective:  Symptoms:  Stable. No shortness of breath or chest pain. Diet:  No nausea or vomiting. Pain:  She reports no pain. Review of Systems   Constitutional: Negative. Negative for fever. HENT: Negative. Eyes: Negative. Respiratory: Negative for shortness of breath. Cardiovascular: Negative for chest pain. Gastrointestinal: Negative for abdominal pain, nausea and vomiting. Endocrine: Negative. Genitourinary: Negative. Musculoskeletal: Negative. Neurological: Positive for syncope. Hematological: Negative. Psychiatric/Behavioral: Negative. Objective         Vitals Last 24 Hours:  TEMPERATURE:  Temp  Av.5 °F (36.9 °C)  Min: 98.1 °F (36.7 °C)  Max: 99.4 °F (37.4 °C)  RESPIRATIONS RANGE: Resp  Av.7  Min: 14  Max: 19  PULSE OXIMETRY RANGE: SpO2  Av.8 %  Min: 91 %  Max: 98 %  PULSE RANGE: Pulse  Av.3  Min: 82  Max: 100  BLOOD PRESSURE RANGE: Systolic (52JBL), TAJ:577 , Min:123 , [place]   ; Diastolic (66CBH), SFA:22, Min:57, Max:75    I/O (24Hr): Intake/Output Summary (Last 24 hours) at 2021 1639  Last data filed at 2021 1219  Gross per 24 hour   Intake 360 ml   Output --   Net 360 ml     Objective:  General Appearance:  Comfortable. Vital signs: (most recent): Blood pressure 127/74, pulse 92, temperature 98.3 °F (36.8 °C), resp. rate 14, height 5' 4\" (1.626 m), weight 74.8 kg (165 lb), SpO2 91 %. Vital signs are normal.  No fever. Lungs:  Normal effort and normal respiratory rate. Breath sounds clear to auscultation. Heart: Normal rate. Regular rhythm. Abdomen: Abdomen is soft. Bowel sounds are normal.     Extremities: There is no deformity. Neurological: Patient is oriented to person, place and time. (Right hemiparesis ). Skin:  Warm. Labs/Imaging/Diagnostics    Labs:  CBC:  Recent Labs     12/04/21 0254 12/03/21 0242 12/02/21 2150   WBC 6.1 5.1 7.0   RBC 3.94 3.73* 4.09   HGB 11.6 11.2* 12.0   HCT 35.3 34.4* 36.8   MCV 89.6 92.2 90.0   RDW 13.4 13.3 13.2    266 272     CHEMISTRIES:  Recent Labs     12/04/21 0254 12/03/21 0242 12/02/21 2150     --  143   K 3.8  --  3.3*   *  --  110*   CO2 23  --  22   BUN 9  --  17   CA 8.7  --  8.3*   PHOS  --  3.1  --    MG  --   --  2.1   PT/INR:No results for input(s): INR, INREXT, INREXT in the last 72 hours. No lab exists for component: PROTIME  APTT:No results for input(s): APTT in the last 72 hours. LIVER PROFILE:  Recent Labs     12/02/21 2150   AST 22   ALT 22     Lab Results   Component Value Date/Time    ALT (SGPT) 22 12/02/2021 09:50 PM    AST (SGOT) 22 12/02/2021 09:50 PM    Alk. phosphatase 140 (H) 12/02/2021 09:50 PM    Bilirubin, total 0.2 12/02/2021 09:50 PM       Imaging Last 24 Hours:  No results found. Assessment//Plan   Principal Problem:    Syncope (12/3/2021)    Active Problems:    Bacteremia (12/4/2021)        Syncope   - patient had 3 episodes of syncope , witnessed prior to admission  - CT head reviewed, CTA head and neck 12/2 reviewed , no vessel occlusion  - orthostatic vitals normal   - Possible cardiac in origin  - ECHO pending   - PT eval   - OP 30 day monitor  per cardiology  - Cardiology consulted    Possible Pneumonia    - CTA chest reviewed . - Empiric Rocephin and Doxycycline x 5 days  - Procalcitonin < 0.05  - Suppl O2 if needed     Bacteremia   - 1/4 bottles + GPC CoNS more likely skin contamination  - repeat blood culture for 12/5 ordered  - discontinued Vancomycin  - ID consulted .  Signed off    Nausea/Vomiting   - resolved   - Monitor electrolytes and replace     Elevated DDIMER  - CTA chest , no PE     Hypokalemia  - Resolved     Hypertension  - resume Norvasc home dose     Depression  - On Wellbutrin    Left Carotid artery stenosis , status post stenting  - on brilanta , zetia , aspirin     Disposition : Possible discharge tomorrow   Assessment & Plan  Electronically signed by Marly Finch MD on 12/5/2021 at 8:28 AM

## 2021-12-05 NOTE — PROGRESS NOTES
[facility] Dosing Services: IV to PO Conversion    The pharmacist has determined that this patient meets P & T approved criteria for conversion from IV to oral therapy for the following medication:Doxycycline 100 mg IV q12hr x5 doses    The pharmacist has written the following order for the patient: Doxycycline 100 mg po q12hr x5 doses  The pharmacist will continue to monitor the patient's status and advise the physician if conversion back to IV therapy is recommended.     Signed Theron Villa Contact information:  [phone]

## 2021-12-06 ENCOUNTER — APPOINTMENT (OUTPATIENT)
Dept: NON INVASIVE DIAGNOSTICS | Age: 81
DRG: 312 | End: 2021-12-06
Attending: NURSE PRACTITIONER
Payer: MEDICARE

## 2021-12-06 ENCOUNTER — APPOINTMENT (OUTPATIENT)
Dept: NON INVASIVE DIAGNOSTICS | Age: 81
DRG: 312 | End: 2021-12-06
Attending: FAMILY MEDICINE
Payer: MEDICARE

## 2021-12-06 LAB
ECHO AO ASC DIAM: 2.72 CM
ECHO AV ANNULUS DIAM: 2.89 CM
ECHO AV AREA PEAK VELOCITY: 2.2 CM2
ECHO AV AREA VTI: 2.45 CM2
ECHO AV AREA/BSA PEAK VELOCITY: 1.2 CM2/M2
ECHO AV AREA/BSA VTI: 1.4 CM2/M2
ECHO AV MEAN GRADIENT: 2.29 MMHG
ECHO AV PEAK GRADIENT: 4.68 MMHG
ECHO AV PEAK VELOCITY: 108.19 CM/S
ECHO AV VTI: 18.4 CM
ECHO LA AREA 4C: 14.78 CM2
ECHO LA MAJOR AXIS: 4.1 CM
ECHO LA MINOR AXIS: 2.32 CM
ECHO LA VOL 2C: 30.11 ML (ref 22–52)
ECHO LA VOL 4C: 36.12 ML (ref 22–52)
ECHO LA VOL BP: 40.32 ML (ref 22–52)
ECHO LA VOL/BSA BIPLANE: 22.78 ML/M2 (ref 16–28)
ECHO LA VOLUME INDEX A2C: 17.01 ML/M2 (ref 16–28)
ECHO LA VOLUME INDEX A4C: 20.41 ML/M2 (ref 16–28)
ECHO LV E' LATERAL VELOCITY: 9.73 CM/S
ECHO LV E' SEPTAL VELOCITY: 6.07 CM/S
ECHO LV INTERNAL DIMENSION DIASTOLIC: 4.56 CM (ref 3.9–5.3)
ECHO LV INTERNAL DIMENSION SYSTOLIC: 3.48 CM
ECHO LV IVSD: 1.13 CM (ref 0.6–0.9)
ECHO LV MASS 2D: 181 G (ref 67–162)
ECHO LV MASS INDEX 2D: 102.3 G/M2 (ref 43–95)
ECHO LV POSTERIOR WALL DIASTOLIC: 1.09 CM (ref 0.6–0.9)
ECHO LVOT CARDIAC OUTPUT: 9.47 LITER/MINUTE
ECHO LVOT DIAM: 1.98 CM
ECHO LVOT PEAK GRADIENT: 2.37 MMHG
ECHO LVOT PEAK VELOCITY: 77.03 CM/S
ECHO LVOT SV: 45.1 ML
ECHO LVOT VTI: 14.61 CM
ECHO MV A VELOCITY: 88.48 CM/S
ECHO MV E DECELERATION TIME (DT): 208.43 MS
ECHO MV E VELOCITY: 62.07 CM/S
ECHO MV E/A RATIO: 0.7
ECHO MV E/E' LATERAL: 6.38
ECHO MV E/E' RATIO (AVERAGED): 8.3
ECHO MV E/E' SEPTAL: 10.23
ECHO PV MAX VELOCITY: 80.77 CM/S
ECHO PV PEAK INSTANTANEOUS GRADIENT SYSTOLIC: 2.61 MMHG
ECHO RV INTERNAL DIMENSION: 3.62 CM
ECHO RV TAPSE: 1.87 CM (ref 1.5–2)
LA VOL DISK BP: 34.52 ML (ref 22–52)
LVOT MG: 1.22 MMHG

## 2021-12-06 PROCEDURE — 74011250637 HC RX REV CODE- 250/637: Performed by: INTERNAL MEDICINE

## 2021-12-06 PROCEDURE — 93306 TTE W/DOPPLER COMPLETE: CPT | Performed by: STUDENT IN AN ORGANIZED HEALTH CARE EDUCATION/TRAINING PROGRAM

## 2021-12-06 PROCEDURE — 65660000000 HC RM CCU STEPDOWN

## 2021-12-06 PROCEDURE — 99232 SBSQ HOSP IP/OBS MODERATE 35: CPT | Performed by: INTERNAL MEDICINE

## 2021-12-06 PROCEDURE — 94760 N-INVAS EAR/PLS OXIMETRY 1: CPT

## 2021-12-06 PROCEDURE — 97162 PT EVAL MOD COMPLEX 30 MIN: CPT

## 2021-12-06 PROCEDURE — 74011250637 HC RX REV CODE- 250/637: Performed by: FAMILY MEDICINE

## 2021-12-06 PROCEDURE — 74011250636 HC RX REV CODE- 250/636: Performed by: INTERNAL MEDICINE

## 2021-12-06 PROCEDURE — APPSS30 APP SPLIT SHARED TIME 16-30 MINUTES: Performed by: NURSE PRACTITIONER

## 2021-12-06 PROCEDURE — 93306 TTE W/DOPPLER COMPLETE: CPT

## 2021-12-06 PROCEDURE — 74011250636 HC RX REV CODE- 250/636: Performed by: FAMILY MEDICINE

## 2021-12-06 PROCEDURE — 97530 THERAPEUTIC ACTIVITIES: CPT

## 2021-12-06 PROCEDURE — 74011000250 HC RX REV CODE- 250: Performed by: INTERNAL MEDICINE

## 2021-12-06 RX ORDER — SODIUM CHLORIDE 9 MG/ML
75 INJECTION, SOLUTION INTRAVENOUS CONTINUOUS
Status: DISCONTINUED | OUTPATIENT
Start: 2021-12-06 | End: 2021-12-08 | Stop reason: HOSPADM

## 2021-12-06 RX ADMIN — SODIUM CHLORIDE, POTASSIUM CHLORIDE, SODIUM LACTATE AND CALCIUM CHLORIDE 75 ML/HR: 600; 310; 30; 20 INJECTION, SOLUTION INTRAVENOUS at 11:52

## 2021-12-06 RX ADMIN — BUPROPION HYDROCHLORIDE 150 MG: 150 TABLET, EXTENDED RELEASE ORAL at 09:04

## 2021-12-06 RX ADMIN — Medication 10 ML: at 17:12

## 2021-12-06 RX ADMIN — Medication 10 ML: at 06:39

## 2021-12-06 RX ADMIN — AMLODIPINE BESYLATE 2.5 MG: 2.5 TABLET ORAL at 09:09

## 2021-12-06 RX ADMIN — TICAGRELOR 45 MG: 90 TABLET ORAL at 17:11

## 2021-12-06 RX ADMIN — DOXYCYCLINE HYCLATE 100 MG: 100 TABLET, COATED ORAL at 17:11

## 2021-12-06 RX ADMIN — Medication 10 ML: at 21:43

## 2021-12-06 RX ADMIN — ACETAMINOPHEN 650 MG: 325 TABLET ORAL at 18:54

## 2021-12-06 RX ADMIN — WATER 1 G: 1 INJECTION INTRAMUSCULAR; INTRAVENOUS; SUBCUTANEOUS at 02:29

## 2021-12-06 RX ADMIN — SODIUM CHLORIDE 75 ML/HR: 9 INJECTION, SOLUTION INTRAVENOUS at 17:51

## 2021-12-06 RX ADMIN — ASPIRIN 81 MG: 81 TABLET, COATED ORAL at 09:04

## 2021-12-06 RX ADMIN — DOXYCYCLINE HYCLATE 100 MG: 100 TABLET, COATED ORAL at 09:04

## 2021-12-06 RX ADMIN — Medication 1 CAPSULE: at 09:04

## 2021-12-06 RX ADMIN — ENOXAPARIN SODIUM 40 MG: 40 INJECTION SUBCUTANEOUS at 09:09

## 2021-12-06 RX ADMIN — TICAGRELOR 45 MG: 90 TABLET ORAL at 06:39

## 2021-12-06 NOTE — PROGRESS NOTES
Transition of Care Plan:  RUR-10%  1. PT recommendations: snf or, if BP improves, home with [facility] with 24hr supervision  2. Pt plans to get a BSC and a bed rail for home  3. ECHO pending- cards following  4. ID following-on IV vanco  5. Family to transport at d/c  6. Pt to have event monitor placed at d/c  7. CM following for any needs prior to d/c  JHONY Abdi

## 2021-12-06 NOTE — PROGRESS NOTES
0700 Bedside shift change report given to [address] (oncoming nurse) by Iris Patel (offgoing nurse). Report included the following information SBAR, Kardex, ED Summary, Intake/Output, MAR and Recent Results. This patient was assisted with Intentional Toileting every 2 hours during this shift as appropriate. Documentation of ambulation and output reflected on Flowsheet as appropriate. Purposeful hourly rounding was completed using AIDET and 5Ps. Outcomes of PHR documented as they occurred. Bed alarm in use as appropriate. Dual Suction and ambubag in place. 1425 Infectious disease RN notified RN that Pt did not meet C-Diff protocols. MD made aware C-diff orders D/C.    1930 Bedside shift change report given to Iris Patel (oncoming nurse) by Fito Ritchie RN (offgoing nurse). Report included the following information SBAR, Kardex, ED Summary, Intake/Output, MAR and Recent Results.

## 2021-12-06 NOTE — PROGRESS NOTES
CARDIOLOGY PROGRESS NOTE        [address] [facility], [place], [address] [facility]  Phone [phone]; Fax [phone]          2021 10:26 AM       Admit Date:           2021  Admit Diagnosis:  Syncope [R55]  :          1940   MRN:          [id number]        Assessment/Plan    Syncope   - multiple episodes since Oct; 3 immediately prior to admission   - CT head reviewed, CTA head and neck  , no vessel occlusion  - orthostatic vitals positive today with PT  - will hold norvasc, order compression hose   - ECHO pending   - 30 day loop monitor  ordered   - pending above results, consider ischemic eval - NM stress vs cath - outpatient    Elevated DIMER- CTA chest , no PE     Hypertension   - PTA Norvasc-holdinig for now     Left Carotid artery stenosis , status post stenting  - on brilanta , zetia , aspirin   - CTA head Neck per above                We discussed the expected course, resolution and complications of the diagnosis(es) in detail. Medication risks, benefits, costs, interactions, and alternatives were discussed as indicated. No intake/output data recorded. Last 3 Recorded Weights in this Encounter    21 0404 21 0349 21 0907   Weight: 165 lb (74.8 kg) 159 lb (72.1 kg) 159 lb (72.1 kg)          190 -  0700  In: 1900 [P.O.:780; I.V.:900]  Out: -     SUBJECTIVE           Marco Hidalgo is a [de-identified] y.o. female with history of recurrent syncope; neurological workup unrevealing thus far. Echo in 10/21 with preserved LV function. EKG/tele unremarkable. Orthostatics prev normal but found positive today with PT. Pt tearful with discussing hx - has had multiple syncopal events since Oct.  States she is always dizzy / nauseated. Prior to admission passed out 3x - first walking to bathroom and twice while in a seated position. No passing out since admission.  However felt very dizzy and lightheaded working with PT. Denies CP or breathing issues prior to admission. SOB now with ? PNA.   +family hx of CAD - son passed away from MI age 47. +tobacco hx - smoked for 30yrs - quit smoking 5 yrs ago. Denies hx of cardiac cath.         Current Facility-Administered Medications   Medication Dose Route Frequency    doxycycline (VIBRA-TABS) tablet 100 mg  100 mg Oral Q12H    amLODIPine (NORVASC) tablet 2.5 mg  2.5 mg Oral DAILY    aspirin delayed-release tablet 81 mg  81 mg Oral DAILY    pantoprazole (PROTONIX) tablet 40 mg  40 mg Oral ACB    ticagrelor (BRILINTA) tablet 45 mg  45 mg Oral Q12H    sodium chloride (NS) flush 5-40 mL  5-40 mL IntraVENous Q8H    sodium chloride (NS) flush 5-40 mL  5-40 mL IntraVENous PRN    acetaminophen (TYLENOL) tablet 650 mg  650 mg Oral Q6H PRN    Or    acetaminophen (TYLENOL) suppository 650 mg  650 mg Rectal Q6H PRN    polyethylene glycol (MIRALAX) packet 17 g  17 g Oral DAILY PRN    ondansetron (ZOFRAN ODT) tablet 4 mg  4 mg Oral Q8H PRN    Or    ondansetron (ZOFRAN) injection 4 mg  4 mg IntraVENous Q6H PRN    enoxaparin (LOVENOX) injection 40 mg  40 mg SubCUTAneous DAILY    L.acidophilus-paracasei-S.thermophil-bifidobacter (RISAQUAD) 8 billion cell capsule  1 Capsule Oral DAILY    cefTRIAXone (ROCEPHIN) 1 g in sterile water (preservative free) 10 mL IV syringe  1 g IntraVENous Q24H    lactated Ringers infusion  75 mL/hr IntraVENous CONTINUOUS    buPROPion XL (WELLBUTRIN XL) tablet 150 mg  150 mg Oral DAILY      OBJECTIVE               Intake/Output Summary (Last 24 hours) at 12/6/2021 1026  Last data filed at 12/5/2021 1840  Gross per 24 hour   Intake 1440 ml   Output --   Net 1440 ml       Review of Systems - History obtained from the patient AS PER  HPI        PHYSICAL EXAM        Visit Vitals  /75 (BP 1 Location: Left upper arm, BP Patient Position: Semi fowlers) Comment (BP Patient Position): post activity   Pulse 88   Temp 98.3 °F (36.8 °C)   Resp 18   Ht 5' 4\" (1.626 m)   Wt 159 lb (72.1 kg)   SpO2 97%   BMI 27.29 kg/m²       General - well developed well nourished, NAD, lying in bed   Neck - neck supple, no JVD  Cardiac - normal S1, S2, RRR, no murmurs, rubs or gallops. No clicks  Vascular -  radials pulses equal bilateral  Lungs - clear to auscultation bilaterals, no rales, wheezing or rhonchi  Abd - soft nontender, non-distended, +BS  Extremities - no edema, warm  Neuro - nonfocal  Psych - normal mood and affect         DATA REVIEW      No specialty comments available. 10/14/21    ECHO ADULT COMPLETE 10/22/2021 10/22/2021    Interpretation Summary  · LV: Estimated LVEF is 55 - 60%. Normal cavity size, wall thickness and systolic function (ejection fraction normal). Wall motion: normal.  · Saline contrast was given to evaluate for intracardiac shunt. Signed by: April Crawley MD on 10/22/2021  2:01 PM      Cardiac monitor: sinus         Laboratory and Imaging have been reviewed by me and are notable for  Lab Results   Component Value Date/Time    Sodium 142 12/04/2021 02:54 AM    Potassium 3.8 12/04/2021 02:54 AM    Chloride 111 (H) 12/04/2021 02:54 AM    CO2 23 12/04/2021 02:54 AM    Anion gap 8 12/04/2021 02:54 AM    Glucose 96 12/04/2021 02:54 AM    BUN 9 12/04/2021 02:54 AM    Creatinine 0.96 12/04/2021 02:54 AM    BUN/Creatinine ratio 9 (L) 12/04/2021 02:54 AM    GFR est AA >60 12/04/2021 02:54 AM    GFR est non-AA 56 (L) 12/04/2021 02:54 AM    Calcium 8.7 12/04/2021 02:54 AM    Bilirubin, total 0.2 12/02/2021 09:50 PM    Alk.  phosphatase 140 (H) 12/02/2021 09:50 PM    Protein, total 6.8 12/02/2021 09:50 PM    Albumin 2.8 (L) 12/02/2021 09:50 PM    Globulin 4.0 12/02/2021 09:50 PM    A-G Ratio 0.7 (L) 12/02/2021 09:50 PM    ALT (SGPT) 22 12/02/2021 09:50 PM    AST (SGOT) 22 12/02/2021 09:50 PM     Lab Results   Component Value Date/Time    WBC 6.1 12/04/2021 02:54 AM    HGB 11.6 12/04/2021 02:54 AM    HCT 35.3 12/04/2021 02:54 AM    PLATELET 758 54/97/0440 02:54 AM    MCV 89.6 12/04/2021 02:54 AM     Lab Results   Component Value Date/Time    Cholesterol, total 180 10/15/2021 03:15 AM    HDL Cholesterol 85 10/15/2021 03:15 AM    LDL, calculated 77.2 10/15/2021 03:15 AM    VLDL, calculated 17.8 10/15/2021 03:15 AM    Triglyceride 89 10/15/2021 03:15 AM    CHOL/HDL Ratio 2.1 10/15/2021 03:15 AM     Lab Results   Component Value Date/Time    TSH 3.44 12/03/2021 02:42 AM    T4, Free 1.3 10/17/2021 12:53 AM     Lab Results   Component Value Date/Time    Hemoglobin A1c 5.6 10/14/2021 08:34 AM     Component      Latest Ref Rng & Units 12/3/2021 12/3/2021 12/3/2021 12/3/2021           8:16 AM  5:32 AM  2:42 AM  2:42 AM   Troponin-High Sensitivity      0 - 51 ng/L   11    NT pro-BNP      <450 PG/ML    64   D-dimer      0.00 - 0.65 mg/L FEU 0.85 (H) 0.78 (H)           Ed Parr NP

## 2021-12-06 NOTE — PROGRESS NOTES
Problem: Mobility Impaired (Adult and Pediatric)  Goal: *Acute Goals and Plan of Care (Insert Text)  Description: FUNCTIONAL STATUS PRIOR TO ADMISSION: Immediately prior to admission pt was ambulatory using quad cane indoors and rollator in community. She reports approximately 1 syncopal episode per week and progressive lightheadedness/dizziness since CVA and L carotid stenting during hospitalization 10/14-10/23. Prior to CVA pt was completely independent with mobility and ADLs, living alone and active in community. HOME SUPPORT PRIOR TO ADMISSION: Pt was living with daughter. Physical Therapy Goals  Initiated 12/6/2021  1. Patient will move from supine to sit and sit to supine  in bed with modified independence within 7 day(s). 2.  Patient will transfer from bed to chair and chair to bed with supervision/set-up using the least restrictive device within 7 day(s). 3.  Patient will perform sit to stand with supervision/set-up within 7 day(s). 4.  Patient will ambulate with supervision/set-up for 100 feet with the least restrictive device within 7 day(s). 5.  Patient will ascend/descend 4 stairs with one handrail(s) with minimal assistance/contact guard assist within 7 day(s). Outcome: Not Met   PHYSICAL THERAPY EVALUATION  Patient: Jen Harp (65 y.o. female)  Date: 12/6/2021  Primary Diagnosis: Syncope [R55]        Precautions:   Fall    ASSESSMENT  Based on the objective data described below, the patient presents with orthostatic BP + presyncopal symptom, generally decreased strength with R more limited than L (consistent with premorbid status), decreased endurance, and limited functional mobility after admission 12/2/21 with nausea, vomiting, and recurrent syncope. Medical work-up has revealed possible pneumonia with bacteremia and she continues to undergo cardiology assessments.  Pt reports onset of dizziness following L carotid stenting in October 2021 with worsening symptoms and unknown etiology despite seeing multiple providers including outpatient PT. She offers excellent participation in PT evaluation, dangling edge of bed x 7 mins, transferring to multiple surfaces, and eventually returning to bed due to symptoms. She is unable to tolerate household distance ambulation and requires 24-hour assistance for safety with mobility at this time. Current Level of Function Impacting Discharge (mobility/balance): CGA    Functional Outcome Measure: The patient scored 50 on the Barthel outcome measure which is indicative of partially dependent status. Other factors to consider for discharge: daughter is PACU RN at this hospital     Patient will benefit from skilled therapy intervention to address the above noted impairments. PLAN :  Recommendations and Planned Interventions: bed mobility training, transfer training, gait training, therapeutic exercises, neuromuscular re-education, modalities, edema management/control, patient and family training/education, and therapeutic activities      Frequency/Duration: Patient will be followed by physical therapy:  5 times a week to address goals. Recommendation for discharge: (in order for the patient to meet his/her long term goals)  Therapy up to 5 days/week in rehab setting; with improved BP stability/improved symptoms she would likely be appropriate for HHPT and 24-hour supervision/assist    This discharge recommendation:  Has not yet been discussed the attending provider and/or case management    IF patient discharges home will need the following DME: pt states intent to purchase bedside commode and bed rail for home use. SUBJECTIVE:   Patient stated No body knows what's going on.  re: diagnosis/etiology of presyncopal symptoms. Pt received supine, agreeable to PT and cleared by RN.     OBJECTIVE DATA SUMMARY:   HISTORY:    Past Medical History:   Diagnosis Date    GERD (gastroesophageal reflux disease)     Hiatal hernia     Hypertension     Nausea & vomiting     Unspecified adverse effect of anesthesia 9-2009    prolonged sedation     Past Surgical History:   Procedure Laterality Date    HX APPENDECTOMY  child, open    HX CHOLECYSTECTOMY  2012    HX ORTHOPAEDIC  2009    lumbar gonzalez.       Personal factors and/or comorbidities impacting plan of care: as above    Home Situation  Home Environment: Private residence  # Steps to Enter: 4  Rails to Enter: Yes  Hand Rails : Bilateral  One/Two Story Residence: Two story, live on 1st floor  Support Systems: Child(mayra)  Patient Expects to be Discharged to[de-identified] [employer]  Current DME Used/Available at Home: Dhruv Shore, rollator, Yolanda Piles, quad, Shower chair  Tub or Shower Type: Shower    EXAMINATION/PRESENTATION/DECISION MAKING:   Critical Behavior:  Neurologic State: Alert  Orientation Level: Oriented X4  Cognition: Follows commands, Appropriate decision making     Hearing:     Skin:  LE exposed skin intact  Edema: none noted LEs  Range Of Motion:   AROM WFL LEs. Strength:           Generally decreased, functional BLEs; R more limited than L (premorbid s/p CVA per pt reports)              Tone & Sensation:       Normal Le tone                           Coordination:   WFL for activities below       Functional Mobility:  Bed Mobility:     Supine to Sit: Additional time; Contact guard assistance; Adaptive equipment; Bed Modified  Sit to Supine: Additional time; Contact guard assistance; Adaptive equipment  Scooting: Supervision  Transfers:  Sit to Stand: Stand-by assistance  Stand to Sit: Stand-by assistance               Dangles edge of bed x 7 mins; stand for orthostatic BP assessment and requires immediate return to sitting due to severity of lightheadedness. Pt reclines in bedside chair x 5 mins for improved symptoms and BP recovery. Bed to/from commode transfer performed with CGA. Set-up assist for hygiene.   Pt reclines in bedside chair x 10 mins with fluctuating dizziness; requests return to supine for safety and improved symptoms. Cardiology NP rounds with pt during PT encounter. Pt reports improved dizziness/lightheadedness after supine rest x 10 mins. Balance:   Sitting: Intact  Standing: With support  Standing - Static: Good  Standing - Dynamic : Fair  Ambulation/Gait Training:  Distance (ft): 5 Feet (ft) (bed to chair)  Assistive Device: Walker, rollator; Gait belt  Ambulation - Level of Assistance: Contact guard assistance                                             No loss of balance                 Therapeutic Exercises: Ankle pumps 4 x 20 with education for performance after initial sitting. Functional Measure:  Barthel Index:    Bathin  Bladder: 10  Bowels: 5  Groomin  Dressin  Feeding: 10  Mobility: 0  Stairs: 0  Toilet Use: 5  Transfer (Bed to Chair and Back): 10  Total: 50/100       The Barthel ADL Index: Guidelines  1. The index should be used as a record of what a patient does, not as a record of what a patient could do. 2. The main aim is to establish degree of independence from any help, physical or verbal, however minor and for whatever reason. 3. The need for supervision renders the patient not independent. 4. A patient's performance should be established using the best available evidence. Asking the patient, friends/relatives and nurses are the usual sources, but direct observation and common sense are also important. However direct testing is not needed. 5. Usually the patient's performance over the preceding 24-48 hours is important, but occasionally longer periods will be relevant. 6. Middle categories imply that the patient supplies over 50 per cent of the effort. 7. Use of aids to be independent is allowed. Score Interpretation (from [address] [place] [address])    Independent   60-79 Minimally independent   40-59 Partially dependent   20-39 Very dependent   <20 Totally dependent     -Artie Gil., Barthel, DMaryanW. (1965).  Functional evaluation: the Barthel Index. [facility] ([address] [place] [address] (1997). The Barthel activities of daily living index: self-reporting versus actual performance in the old (> or = 75 years). Journal of [address] [place] [address] 45(7), [address] [facility], DENNIS, Barron Hall., Nuvia Cargo. (1999). Measuring the change in disability after inpatient rehabilitation; comparison of the responsiveness of the Barthel Index and Functional Kemper Measure. Journal of Neurology, Neurosurgery, and Psychiatry, 66(4), 267-703. ANNA Brooks, ARIANA Anderson, & Emmanuel Zamora M.A. (2004) Assessment of post-stroke quality of life in cost-effectiveness studies: The usefulness of the Barthel Index and the EuroQoL-5D. Quality of Life Research, 15, 144-97            Physical Therapy Evaluation Charge Determination   History Examination Presentation Decision-Making   HIGH Complexity :3+ comorbidities / personal factors will impact the outcome/ POC  HIGH Complexity : 4+ Standardized tests and measures addressing body structure, function, activity limitation and / or participation in recreation  HIGH Complexity : Unstable and unpredictable characteristics  Other outcome measures barthel  MEDIUM      Based on the above components, the patient evaluation is determined to be of the following complexity level: MEDIUM    Pain Rating:  Denies pain    Activity Tolerance:   signs and symptoms of orthostatic hypotension    After treatment patient left in no apparent distress:   Supine in bed, Call bell within reach, Bed / chair alarm activated, and Side rails x 3    COMMUNICATION/EDUCATION:   The patients plan of care was discussed with: Registered nurse and cardiology NP , attending provider via perfectserve. Fall prevention education was provided and the patient/caregiver indicated understanding., Patient/family have participated as able in goal setting and plan of care. , and Patient/family agree to work toward stated goals and plan of care.     Thank you for this referral.  Ima Dancer, PT, DPT   Time Calculation: 67 mins

## 2021-12-06 NOTE — PROGRESS NOTES
Progress Note  Date:2021       Room:[facility]  Patient Berny Irwin     YOB: 1940     Age:81 y.o. Subjective    Subjective     Patient seen today. She was orthostatic + with PT today. She denies any CP. She claims that she gets lightheaded when standing from sitting or lying positions. She also complaints of diarrhea since admission, BM watery x4  yesterday. Review of Systems   Constitutional: Negative. Negative for fever. HENT: Negative. Eyes: Negative. Gastrointestinal: Negative for abdominal pain. Endocrine: Negative. Genitourinary: Negative. Musculoskeletal: Negative. Neurological: Positive for syncope. Hematological: Negative. Psychiatric/Behavioral: Negative. Objective         Vitals Last 24 Hours:  TEMPERATURE:  Temp  Av.5 °F (36.9 °C)  Min: 97.9 °F (36.6 °C)  Max: 99.2 °F (37.3 °C)  RESPIRATIONS RANGE: Resp  Av.3  Min: 14  Max: 18  PULSE OXIMETRY RANGE: SpO2  Av.9 %  Min: 95 %  Max: 100 %  PULSE RANGE: Pulse  Av.1  Min: 73  Max: 96  BLOOD PRESSURE RANGE: Systolic (80XTK), RRW:743 , Min:121 , OXX:110   ; Diastolic (98EDE), UPZ:35, Min:61, Max:82    I/O (24Hr): Intake/Output Summary (Last 24 hours) at 2021 1726  Last data filed at 2021 0755  Gross per 24 hour   Intake 1440 ml   Output --   Net 1440 ml     Objective:  General Appearance:  Comfortable. Vital signs: (most recent): Blood pressure 137/75, pulse 88, temperature 98.3 °F (36.8 °C), resp. rate 18, height 5' 4\" (1.626 m), weight 72.1 kg (159 lb), SpO2 97 %. Vital signs are normal.  No fever. Lungs:  Normal effort and normal respiratory rate. Breath sounds clear to auscultation. Heart: Normal rate. Regular rhythm. Abdomen: Abdomen is soft. Bowel sounds are normal.     Extremities: There is no deformity. Neurological: Patient is oriented to person, place and time. (Right hemiparesis ). Skin:  Warm. Labs/Imaging/Diagnostics    Labs:  CBC:  Recent Labs     12/04/21 0254   WBC 6.1   RBC 3.94   HGB 11.6   HCT 35.3   MCV 89.6   RDW 13.4        CHEMISTRIES:  Recent Labs     12/04/21 0254      K 3.8   *   CO2 23   BUN 9   CA 8.7   PT/INR:No results for input(s): INR, INREXT, INREXT in the last 72 hours. No lab exists for component: PROTIME  APTT:No results for input(s): APTT in the last 72 hours. LIVER PROFILE:  No results for input(s): AST, ALT in the last 72 hours. No lab exists for component: Racquel Sabrina, ALKPHOS  Lab Results   Component Value Date/Time    ALT (SGPT) 22 12/02/2021 09:50 PM    AST (SGOT) 22 12/02/2021 09:50 PM    Alk. phosphatase 140 (H) 12/02/2021 09:50 PM    Bilirubin, total 0.2 12/02/2021 09:50 PM       Imaging Last 24 Hours:  ECHO ADULT COMPLETE    Result Date: 12/6/2021  · LV: Estimated LVEF is 55 - 60%. Normal cavity size and systolic function (ejection fraction normal). Upper normal wall thickness. Wall motion: unable to assess due to limited image quality. Age-appropriate left ventricular diastolic function. Assessment//Plan   Principal Problem:    Syncope (12/3/2021)    Active Problems:    Bacteremia (12/4/2021)        Assessment & Plan    Syncope   - Likely Orthostatic   - patient had 3 episodes of syncope , witnessed prior to admission  - Orthotatic + with PT this am.  - gentle  cc only pending ECHO results   - CT head reviewed, CTA head and neck 12/2 reviewed , no vessel occlusion  - orthostatic vitals normal   - Possible cardiac in origin  - ECHO reviewed: EF 60%  - PT recommendations pending   - OP 30 day monitor  per cardiology   - Norvasc held yesterday ,however resume after hydration in am  - Gentle IVF  - Cardiology consulted. Recommending discharge from cardiac standpoint. Possible Pneumonia    - CTA chest reviewed .    - Empiric Rocephin and Doxycycline x 5 days  - Procalcitonin < 0.05  - Suppl O2 if needed     Bacteremia   - 1/4 bottles + GPC CoNS more likely skin contamination  - discontinued Vancomycin  - ID consulted .  Signed off    Nausea/Vomiting   - resolved   - Monitor electrolytes and replace     Elevated DDIMER  - CTA chest , no PE     Hypokalemia  - Resolved     Hypertension  - resume Norvasc home dose     Diarrhea   - r/o C DIFF pending  - Enteric culture pending  - enteric precautions     Depression  - On Wellbutrin    Left Carotid artery stenosis , status post stenting  - on brilanta , zetia , aspirin     Disposition : Possible discharge tomorrow to [facility] vs [facility]        Electronically signed by Shaniqua Skinner MD on 12/6/2021 at 8:28 AM

## 2021-12-07 VITALS
OXYGEN SATURATION: 97 % | WEIGHT: 159 LBS | BODY MASS INDEX: 27.14 KG/M2 | SYSTOLIC BLOOD PRESSURE: 136 MMHG | RESPIRATION RATE: 14 BRPM | DIASTOLIC BLOOD PRESSURE: 75 MMHG | HEIGHT: 64 IN | TEMPERATURE: 98 F | HEART RATE: 79 BPM

## 2021-12-07 PROCEDURE — 74011250637 HC RX REV CODE- 250/637: Performed by: FAMILY MEDICINE

## 2021-12-07 PROCEDURE — 74011250637 HC RX REV CODE- 250/637: Performed by: INTERNAL MEDICINE

## 2021-12-07 PROCEDURE — 94760 N-INVAS EAR/PLS OXIMETRY 1: CPT

## 2021-12-07 PROCEDURE — 97116 GAIT TRAINING THERAPY: CPT

## 2021-12-07 PROCEDURE — 74011250636 HC RX REV CODE- 250/636: Performed by: FAMILY MEDICINE

## 2021-12-07 PROCEDURE — 74011250636 HC RX REV CODE- 250/636: Performed by: INTERNAL MEDICINE

## 2021-12-07 PROCEDURE — 97530 THERAPEUTIC ACTIVITIES: CPT

## 2021-12-07 PROCEDURE — 74011000250 HC RX REV CODE- 250: Performed by: INTERNAL MEDICINE

## 2021-12-07 RX ORDER — ONDANSETRON 4 MG/1
4 TABLET, ORALLY DISINTEGRATING ORAL
Qty: 30 TABLET | Refills: 0 | Status: SHIPPED | OUTPATIENT
Start: 2021-12-07

## 2021-12-07 RX ORDER — AMLODIPINE BESYLATE 5 MG/1
2.5 TABLET ORAL DAILY
Status: DISCONTINUED | OUTPATIENT
Start: 2021-12-07 | End: 2021-12-08 | Stop reason: HOSPADM

## 2021-12-07 RX ADMIN — BUPROPION HYDROCHLORIDE 150 MG: 150 TABLET, EXTENDED RELEASE ORAL at 08:09

## 2021-12-07 RX ADMIN — AMLODIPINE BESYLATE 2.5 MG: 5 TABLET ORAL at 09:41

## 2021-12-07 RX ADMIN — Medication 10 ML: at 13:34

## 2021-12-07 RX ADMIN — ASPIRIN 81 MG: 81 TABLET, COATED ORAL at 08:09

## 2021-12-07 RX ADMIN — TICAGRELOR 45 MG: 90 TABLET ORAL at 17:29

## 2021-12-07 RX ADMIN — WATER 1 G: 1 INJECTION INTRAMUSCULAR; INTRAVENOUS; SUBCUTANEOUS at 03:45

## 2021-12-07 RX ADMIN — ENOXAPARIN SODIUM 40 MG: 40 INJECTION SUBCUTANEOUS at 08:08

## 2021-12-07 RX ADMIN — PANTOPRAZOLE SODIUM 40 MG: 40 TABLET, DELAYED RELEASE ORAL at 07:30

## 2021-12-07 RX ADMIN — Medication 10 ML: at 06:42

## 2021-12-07 RX ADMIN — DOXYCYCLINE HYCLATE 100 MG: 100 TABLET, COATED ORAL at 17:29

## 2021-12-07 RX ADMIN — Medication 1 CAPSULE: at 08:09

## 2021-12-07 RX ADMIN — SODIUM CHLORIDE 75 ML/HR: 9 INJECTION, SOLUTION INTRAVENOUS at 06:42

## 2021-12-07 RX ADMIN — TICAGRELOR 45 MG: 90 TABLET ORAL at 06:41

## 2021-12-07 RX ADMIN — DOXYCYCLINE HYCLATE 100 MG: 100 TABLET, COATED ORAL at 08:09

## 2021-12-07 RX ADMIN — ONDANSETRON 4 MG: 2 INJECTION INTRAMUSCULAR; INTRAVENOUS at 09:41

## 2021-12-07 NOTE — PROGRESS NOTES
Problem: Mobility Impaired (Adult and Pediatric)  Goal: *Acute Goals and Plan of Care (Insert Text)  Description: FUNCTIONAL STATUS PRIOR TO ADMISSION: Immediately prior to admission pt was ambulatory using quad cane indoors and rollator in community. She reports approximately 1 syncopal episode per week and progressive lightheadedness/dizziness since CVA and L carotid stenting during hospitalization 10/14-10/23. Prior to CVA pt was completely independent with mobility and ADLs, living alone and active in community. HOME SUPPORT PRIOR TO ADMISSION: Pt was living with daughter. Physical Therapy Goals  Initiated 12/6/2021  1. Patient will move from supine to sit and sit to supine  in bed with modified independence within 7 day(s). 2.  Patient will transfer from bed to chair and chair to bed with supervision/set-up using the least restrictive device within 7 day(s). 3.  Patient will perform sit to stand with supervision/set-up within 7 day(s). 4.  Patient will ambulate with supervision/set-up for 100 feet with the least restrictive device within 7 day(s). 5.  Patient will ascend/descend 4 stairs with one handrail(s) with minimal assistance/contact guard assist within 7 day(s). Outcome: Progressing Towards Goal   PHYSICAL THERAPY TREATMENT  Patient: Sung Allen (19 y.o. female)  Date: 12/7/2021  Diagnosis: Syncope [R55]   Syncope       Precautions: Fall  Chart, physical therapy assessment, plan of care and goals were reviewed. ASSESSMENT  Patient continues with skilled PT services and is progressing towards goals. Pt received supine in bed stating vomited this morning and in need of wearing a brief. Assisted with changing gown. Supine to sit with CGA and verbal cues for body mechanics. Stating need for BSC due to continues loose bowels/diarrhea. Sit to stand with Min A to CGA with RW.  3' to [facility] with RW same. Pt able to perform hygenic care with set up. Placed in chair after tasks.   Pt continues to be orthostatic per below. Asymptomatic with VSS in recliner with BLE elevated. RN informed. Recommending HHPT with discharge to [facility] per pt statement. CM informed. Patient Vitals for the past 4 hrs:   Temp Pulse Resp BP SpO2   12/07/21 1123  sitting 97.7 °F (36.5 °C) 81 17 137/66 98 %   12/07/21 1057  sitting -- -- -- 122/65 --   12/07/21 1053 standing -- 81 -- 122/74 --   12/07/21 1042 sitting -- -- -- (!) 150/76 --   12/07/21 1039 supine -- 81 -- (!) 173/76 --   12/07/21 1038 -- 80 -- (!) 164/79 95 %   12/07/21 0949 -- -- -- (!) 151/69 --   12/07/21 0947 -- -- -- (!) 151/78 --   12/07/21 0946 -- -- -- (!) 151/77 --          Current Level of Function Impacting Discharge (mobility/balance): M    Other factors to consider for discharge: per above         PLAN :  Patient continues to benefit from skilled intervention to address the above impairments. Continue treatment per established plan of care. to address goals. Recommendation for discharge: (in order for the patient to meet his/her long term goals)  Physical therapy at least 2 days/week in the home     This discharge recommendation:  Has been made in collaboration with the attending provider and/or case management    IF patient discharges home will need the following DME: has rollator       SUBJECTIVE:   Patient stated I want to go home, but am still having diarrhea.     OBJECTIVE DATA SUMMARY:   Critical Behavior:  Neurologic State: Alert  Orientation Level: Oriented X4  Cognition: Appropriate decision making, Appropriate for age attention/concentration, Appropriate safety awareness     Functional Mobility Training:  Bed Mobility:     Supine to Sit: Contact guard assistance;  Additional time (verbal cues for hand placement)     Scooting: Modified independent        Transfers:  Sit to Stand: Stand-by assistance; Contact guard assistance  Stand to Sit: Stand-by assistance; Contact guard assistance Balance:  Sitting: Intact  Standing: Intact; With support  Standing - Static: Good  Standing - Dynamic : Fair  Ambulation/Gait Training:  Distance (ft): 3 Feet (ft)  Assistive Device: Walker, rolling; Gait belt  Ambulation - Level of Assistance: Contact guard assistance; Minimal assistance                 Base of Support: Narrowed     Speed/Anusha: Slow                   Activity Tolerance:   Fair    After treatment patient left in no apparent distress:   Sitting in chair, Heels elevated for pressure relief, Call bell within reach, and Bed / chair alarm activated    COMMUNICATION/COLLABORATION:   The patients plan of care was discussed with: Registered nurse and Case management.      Freddy Benson, PT   Time Calculation: 33 mins

## 2021-12-07 NOTE — PROGRESS NOTES
In preparation for discharge, I have completed AVS Med up-dates and added Educational information on new medication. Patient will need cardiac event monitor and home health set up by Case Management. Primary nurse updated.

## 2021-12-07 NOTE — DISCHARGE SUMMARY
Admit date: 12/2/2021   Admitting Provider: Alberta Elise MD    Discharge date: 12/7/2021  Discharging Provider: Terry Sue MD      * Admission Diagnoses: Syncope [R55]    * Discharge Diagnoses:    Hospital Problems as of 12/7/2021 Date Reviewed: 12/3/2021          Codes Class Noted - Resolved POA    Bacteremia ICD-10-CM: R78.81  ICD-9-CM: 790.7  12/4/2021 - Present Yes        * (Principal) Syncope ICD-10-CM: R55  ICD-9-CM: 780.2  12/3/2021 - Present Yes              * Hospital Course:     Admission H&P:    CHIEF COMPLAINT:  Nausea and vomiting.     HISTORY OF PRESENT ILLNESS:  This 81-year-old woman with past medical history significant for dyslipidemia, hypertension, depression, left carotid artery stenosis; status post stenting was in her usual state of health until about a month ago when the patient started experiencing nausea and vomiting as well as diarrhea. The symptoms are progressive according to the patient. These symptoms started shortly after she was admitted and treated for acute CVA. The patient was admitted to [facility] from 10/14/2021 to 10/23/2021. The patient was admitted and treated for acute CVA. During that hospitalization, the patient was found to have left carotid artery stenosis and underwent stenting. The patient stated that her symptoms started shortly after that. She has also been having dizziness which the patient described as room spinning around her. According to her, she is unsteady on her feet. She felt like she was going to fall down. She is having the feeling like she was drunk. On the day of her presentation at the emergency room, the patient developed syncope following episode of nausea, vomiting, and diarrhea and was brought to the emergency room for further evaluation. No seizure activity was reported. It was also reported that the patient's blood pressure was low.   When the patient arrived at the emergency room, CT scan of the head was obtained and this was negative. The CTA of the head and neck was also performed which did not show any large vessel occlusion in the head or neck. A left carotid stent is noted. The patient was subsequently referred to the hospitalist service for evaluation for admission. The chest x-ray shows right lower lobe infection. Inpatient Summary :    Syncope   - Likely Orthostatic   - patient had 3 episodes of syncope , witnessed prior to admission  - Orthotatic + with PT this am.  - gentle  cc only pending ECHO results   - CT head reviewed, CTA head and neck 12/2 reviewed , no vessel occlusion  - orthostatic vitals normal   - Possible cardiac in origin  - ECHO reviewed: EF 60%  - PT recommendations pending   - OP 30 day monitor  per cardiology   - Norvasc held yesterday ,however resume after hydration in am  - Gentle IVF  - Patient will need OP evaluation by ENT for fullness in EARS and lightheadedness to r/o Meniere's dx  - Cardiology consulted. Recommending discharge from cardiac standpoint.     Possible Pneumonia    - CTA chest reviewed . - Completed Empiric Rocephin and Doxycycline x 5 days  - Procalcitonin < 0.05  - Suppl O2 if needed      Bacteremia   - 1/4 bottles + GPC CoNS more likely skin contamination  - discontinued Vancomycin  - ID consulted .  Signed off     Nausea/Vomiting   - resolved   - zofran as needed   - Monitor electrolytes and replace      Elevated DDIMER  - CTA chest , no PE      Hypokalemia  - Resolved      Hypertension  -Hold Norvasc on discharge due to orthostatic hypotension  - Follow up with PCP outpatient to review      Diarrhea   - resolved   -  C DIFF test d/c'd  - Enteric culture test d/c'd        Depression  - On Wellbutrin     Left Carotid artery stenosis , status post stenting  - on brilanta , zetia , aspirin        * Procedures: none  * No surgery found *      Consults: Cardiology and ID    Significant Diagnostic Studies: radiology: CT scan:     CTA chest  IMPRESSION  1. No pulmonary embolism, no acute vascular abnormality. 2. Nonspecific focus of groundglass opacification in the right lower lobe  favoring an infectious or inflammatory etiology. 3. Enlarged anterior mediastinal lymph node of uncertain significance, possibly  Reactive.      and cardiac graphics:     CT ABD/PELVIS   IMPRESSION  1. No acute abnormality in the abdomen or pelvis.     2. Right lower lobe airspace opacity suggesting nonspecific infection or  inflammation. Echocardiogram:     Left Ventricle Normal cavity size and systolic function (ejection fraction normal). Upper normal wall thickness. Wall motion: unable to assess due to limited image quality. The estimated EF is 55 - 60%. There is age-appropriate left ventricular diastolic function. Left Atrium Normal cavity size. Right Ventricle Normal cavity size, wall thickness and global systolic function. Right Atrium Normal cavity size. Interatrial Septum No interatrial shunt visualized on color doppler. Aortic Valve Aortic valve not well visualized. No stenosis. Probably trileaflet aortic valve. Trace aortic valve regurgitation. Mitral Valve Normal valve structure and no stenosis. Trace regurgitation. Tricuspid Valve Normal valve structure and no stenosis. Trace regurgitation. Pulmonic Valve Pulmonic valve not well visualized, but normal doppler findings. No stenosis. Aorta Normal aortic root. Pulmonary Artery Normal pulmonary arteries. IVC/Hepatic Veins Normal structure. Pericardium No evidence of pericardial effusion. Discharge Exam:  Visit Vitals  /75 (BP 1 Location: Left arm, BP Patient Position: At rest)   Pulse 79   Temp 98 °F (36.7 °C)   Resp 14   Ht 5' 4\" (1.626 m)   Wt 72.1 kg (159 lb)   SpO2 97%   BMI 27.29 kg/m²     General:  Alert, cooperative, no distress, appears stated age. Head:  Normocephalic, without obvious abnormality, atraumatic. Eyes:  Conjunctivae/corneas clear. PERRL, EOMs intact.  Fundi benign. Ears:  Normal TMs and external ear canals both ears. Nose: Nares normal. Septum midline. Mucosa normal. No drainage or sinus tenderness. Throat: Lips, mucosa, and tongue normal. Teeth and gums normal.   Neck: Supple, symmetrical, trachea midline, no adenopathy, thyroid: no enlargement/tenderness/nodules, no carotid bruit and no JVD. Back:   Symmetric, no curvature. ROM normal. No CVA tenderness. Lungs:   Clear to auscultation bilaterally. Chest wall:  No tenderness or deformity. Heart:  Regular rate and rhythm, S1, S2 normal, no murmur, click, rub or gallop. Breast Exam:  No tenderness, masses, or nipple abnormality. Abdomen:   Soft, non-tender. Bowel sounds normal. No masses,  No organomegaly. Genitalia:  Normal female without lesion, discharge or tenderness. Rectal:  Normal tone,  no masses or tenderness  Guaiac negative stool. Extremities: Extremities normal, atraumatic, no cyanosis or edema. Pulses: 2+ and symmetric all extremities. Skin: Skin color, texture, turgor normal. No rashes or lesions. Lymph nodes: Cervical, supraclavicular, and axillary nodes normal.   Neurologic: CNII-XII intact. Normal strength, sensation and reflexes throughout. * Discharge Condition: stable  * Disposition: Home    Discharge Medications:  Current Discharge Medication List      START taking these medications    Details   ondansetron (ZOFRAN ODT) 4 mg disintegrating tablet Take 1 Tablet by mouth every eight (8) hours as needed for Nausea or Vomiting. Qty: 30 Tablet, Refills: 0  Start date: 12/7/2021         CONTINUE these medications which have NOT CHANGED    Details   aspirin 81 mg chewable tablet Take 81 mg by mouth daily. buPROPion XL (WELLBUTRIN XL) 150 mg tablet Take 150 mg by mouth daily. lansoprazole (PREVACID) 30 mg capsule Take 30 mg by mouth Daily (before breakfast). evolocumab (REPATHA SURECLICK) pen injection 671 mg by SubCUTAneous route every fourteen (14) days. cyanocobalamin 1,000 mcg tablet Take 1,000 mcg by mouth daily. therapeutic multivitamin (THERAGRAN) tablet Take 1 Tablet by mouth daily. acetaminophen (TYLENOL) 325 mg tablet Take 650 mg by mouth every six (6) hours as needed (headache). latanoprost (XALATAN) 0.005 % ophthalmic solution Administer 1 Drop to both eyes nightly. OTHER,NON-FORMULARY, Take  by mouth nightly. Over-the-counter medication for Restless Legs Syndrome      meclizine (ANTIVERT) 12.5 mg tablet Take 12.5 mg by mouth two (2) times daily as needed for Dizziness. ticagrelor (BRILINTA) 90 mg tablet Take 0.5 Tablets by mouth every twelve (12) hours every twelve (12) hours. Qty: 60 Tablet, Refills: 0         STOP taking these medications       amLODIPine (NORVASC) 2.5 mg tablet Comments:   Reason for Stopping:               * Follow-up Care/Patient Instructions:   Activity: Activity as tolerated and PT/OT per Home Health  Diet: Regular Diet  Wound Care: None needed    Follow-up Information     Follow up With Specialties Details Why Contact Info    Jac Thorpe NP Nurse Practitioner On 1/11/2021 at 10:40am [place] [id number]  [address]  [place] [id number] [phone]      Migel Parish MD Internal Medicine   [address] [place], [place] [address] [facility] [phone]            Signed:  Shaniqua Skinner MD  12/7/2021  3:59 PM

## 2021-12-07 NOTE — PROGRESS NOTES
Transition of Care Plan:  RUR-10%; LOS 5 days  1. PT recommendation for home health; pt chose [facility]  2. Pt plans to get a BSC and a bed rail for home  3. Cardiology following  4. Family to transport at d/c-pt will go to her daughter's home  5. Pt to have event monitor placed at d/c  6. CM following for any needs prior to d/c  JHONY Hassan, [address]:  PT recommended home health PT. She had been going to OP tx prior to admission. Pt agreeable to home health & chose [employer]. Need a home health order. CM Note:  Attempted to see pt for home health choice. She was working with therapy. Will return when finished with tx session.   CRISTAL Gutiérrez

## 2021-12-07 NOTE — PROGRESS NOTES
0700 Bedside and Verbal shift change report given to KOBI BEE (oncoming nurse) by Mihaela Merritt RN (offgoing nurse). Report included the following information SBAR, Kardex, Intake/Output, MAR, Accordion, Recent Results and Med Rec Status. This patient was assisted with Intentional Toileting every 2 hours during this shift as appropriate. Documentation of ambulation and output reflected on Flowsheet as appropriate. Purposeful hourly rounding was completed using AIDET and 5Ps. Outcomes of PHR documented as they occurred. Bed alarm in use as appropriate. Dual Suction and ambubag in place. 1615 Discharge orders received; patient's ride will arrive at approximately 31 [phone].     1815 I have reviewed discharge instructions with the patient. The patient verbalized understanding.

## 2021-12-08 ENCOUNTER — HOME HEALTH ADMISSION (OUTPATIENT)
Dept: HOME HEALTH SERVICES | Facility: HOME HEALTH | Age: 81
End: 2021-12-08

## 2021-12-08 NOTE — PROGRESS NOTES
Patient was dc'd 12/07 21:33 pm. Ordered monitor from [facility] [employer] for home delivery 12/08 1:48 pm.   I tried calling patient today to notify a monitor will be shipped to home address. No answer and does not roll into a voicemail. I will try again.

## 2021-12-08 NOTE — PROGRESS NOTES
12/8/2021  2:01 PM  Chart accessed to [facility] order for PT  HADLEY BRITT [facility] has accepted py is requesting order  Juan Luna, BS

## 2021-12-10 LAB
BACTERIA SPEC CULT: ABNORMAL
SERVICE CMNT-IMP: ABNORMAL

## 2021-12-11 ENCOUNTER — HOME CARE VISIT (OUTPATIENT)
Dept: HOME HEALTH SERVICES | Facility: HOME HEALTH | Age: 81
End: 2021-12-11

## 2021-12-13 ENCOUNTER — HOME CARE VISIT (OUTPATIENT)
Dept: HOME HEALTH SERVICES | Facility: HOME HEALTH | Age: 81
End: 2021-12-13

## 2021-12-15 ENCOUNTER — HOME CARE VISIT (OUTPATIENT)
Dept: HOME HEALTH SERVICES | Facility: HOME HEALTH | Age: 81
End: 2021-12-15

## 2021-12-16 ENCOUNTER — HOME CARE VISIT (OUTPATIENT)
Dept: HOME HEALTH SERVICES | Facility: HOME HEALTH | Age: 81
End: 2021-12-16

## 2021-12-16 NOTE — PROGRESS NOTES
Physician Progress Note      PATIENT:               Gaby Oshea  CSN #:                  [id number]  :                       1940  ADMIT DATE:       2021 9:13 PM  [address] DATE:        2021 9:33 PM  RESPONDING  PROVIDER #:        Meme Burton MD          QUERY TEXT:    Pt admitted with N/V/D. Pt noted to have Hx. dyslipidemia, hypertension, depression, left carotid artery stenosis; status post stenting. Patient noted to have Syncope likely Orthostatic. Patient also noted to have Bacterial pneumonia. If possible, please clarify documentation in the progress notes and d/c summary if you are evaluating and / or treating any of the following: The medical record reflects the following:  Risk Factors: Hx: dyslipidemia, hypertension, depression, left carotid artery stenosis; status post stenting  Clinical Indicators: hr: 86-91; rr: 22; bp: 145/61 ^ 140/87. .. Benedetta Ou Benedetta Ou K+: 3.3; Gluc: 97; bun/cr: 17/1.13; Alb: 2.8; Alk phos: 140; Lac acid: 0.6; Trop. high sensitivity: 9 ^ 11; pBNP: 64. ... Benedetta Ou Benedetta Ou BCx: 1/4 bottles + GPC CoNS more likely skin contamination. Benedetta Ou Benedetta Ou Benedetta Ou Echo: LV: Estimated LVEF is 55 - 60%. Normal cavity size and systolic function (ejection fraction normal). Upper normal wall thickness. Wall motion: unable to assess due to limited image quality. Age-appropriate left ventricular diastolic function. .... Benedetta Ou CT HEAD: neg. ...... CT ABD/PELVIS: neg. ..... Benedetta Ou CXR: RLL infection/inflammation. .... Benedetta Ou CTA HEAD/NECK: Status post left cervical ICA stenting with a waist in the mid stent causing less than 30% stenosis; Specific 12 mm anterior mediastinal node, possibly reactive. Benedetta Ou Benedetta Ou Benedetta Ou Noted: patient had 3 episodes of syncope , witnessed prior to admission; orthostatic vitals normal, Possible cardiac in origin. ... Benedetta Ou Benedetta Ou Noted: OP 30 day monitor  per cardiology. ...... Myron Gustafson Patient will need OP evaluation by ENT for fullness in EARS and lightheadedness to r/o Meniere's dx    Treatment: Procal; Bcx;  IVF bolus; CT Head; CT ABD/PELVIS; CXR; CTA Head/Neck; Orthostatic VS; Echo; Cards consult; Continue empiric Rocephin and Doxycycline; Suppl O2 as needed    Thank you,    Vik Iraheta  [facility]  [phone]  Options provided:  -- Syncope due to Orthostatic hypotension  -- Syncope due to Orthostatic pneumonia  -- Other - I will add my own diagnosis  -- Disagree - Not applicable / Not valid  -- Disagree - Clinically unable to determine / Unknown  -- Refer to Clinical Documentation Reviewer    PROVIDER RESPONSE TEXT:    This patient has Syncope due to Orthostatic hypotension.     Query created by: Osvaldo Tucker on 12/10/2021 9:39 AM      Electronically signed by:  Josie Antonio MD 12/16/2021 9:57 AM

## 2022-03-18 PROBLEM — R78.81 BACTEREMIA: Status: ACTIVE | Noted: 2021-12-04

## 2022-03-19 PROBLEM — F32.A DEPRESSION: Status: ACTIVE | Noted: 2018-10-25

## 2022-03-19 PROBLEM — M25.552 ACUTE HIP PAIN, LEFT: Status: ACTIVE | Noted: 2018-10-22

## 2022-03-19 PROBLEM — R55 SYNCOPE: Status: ACTIVE | Noted: 2021-12-03

## 2022-03-19 PROBLEM — S72.112A FRACTURE OF GREATER TROCHANTER OF LEFT FEMUR (HCC): Status: ACTIVE | Noted: 2018-10-22

## 2022-03-19 PROBLEM — I63.9 CVA (CEREBRAL VASCULAR ACCIDENT) (HCC): Status: ACTIVE | Noted: 2021-10-14

## 2022-03-20 PROBLEM — I95.1 ORTHOSTATIC HYPOTENSION: Status: ACTIVE | Noted: 2021-10-18

## 2023-05-11 RX ORDER — ACETAMINOPHEN 325 MG/1
TABLET ORAL EVERY 6 HOURS PRN
COMMUNITY

## 2023-05-11 RX ORDER — LATANOPROST 50 UG/ML
1 SOLUTION/ DROPS OPHTHALMIC
COMMUNITY

## 2023-05-11 RX ORDER — LANSOPRAZOLE 30 MG/1
CAPSULE, DELAYED RELEASE ORAL
COMMUNITY

## 2023-05-11 RX ORDER — MECLIZINE HCL 12.5 MG/1
TABLET ORAL 2 TIMES DAILY PRN
COMMUNITY

## 2023-05-11 RX ORDER — ASPIRIN 81 MG/1
81 TABLET, CHEWABLE ORAL DAILY
COMMUNITY

## 2023-05-11 RX ORDER — BUPROPION HYDROCHLORIDE 150 MG/1
150 TABLET ORAL DAILY
COMMUNITY

## 2023-05-11 RX ORDER — ONDANSETRON 4 MG/1
TABLET, ORALLY DISINTEGRATING ORAL EVERY 8 HOURS PRN
COMMUNITY
Start: 2021-12-07

## 2023-09-08 NOTE — PROGRESS NOTES
The patient was draped in a sterile fashion. Transition of Care Plan: Likely IPR: [facility] accepted: Needs PCR     RUR: 11%     PCP F/U: Dr. Collin Crowell     Disposition: Likely [facility]-needs PCR     Transportation: BLS     Main Contact: Kyle Head, [phone]: Spoke with daughter Catalina Reyes about choice. States her first choice is [facility] and second choice is [facility]. Referrals sent via [employer]. Status pending. 1039: Spoke with Ritchie Pantoja at [facility]. Willing to accept, but daughter does want to see if [facility] comes back and accepts. Have reached out to [facility] and left a message to return this CM's call. 1120: Spoke with Ilia Leung at [facility]:([phone]) States they have reviewed case with their MD and she has been approved pending medical stability, bed availability, and PCR Covid test. Attending notified.      Zoran Ramos RN, CRM

## 2024-06-16 ENCOUNTER — APPOINTMENT (OUTPATIENT)
Facility: HOSPITAL | Age: 84
End: 2024-06-16
Payer: MEDICARE

## 2024-06-16 ENCOUNTER — HOSPITAL ENCOUNTER (EMERGENCY)
Facility: HOSPITAL | Age: 84
Discharge: HOME | End: 2024-06-16
Attending: EMERGENCY MEDICINE
Payer: MEDICARE

## 2024-06-16 VITALS
DIASTOLIC BLOOD PRESSURE: 73 MMHG | OXYGEN SATURATION: 97 % | BODY MASS INDEX: 26.15 KG/M2 | RESPIRATION RATE: 20 BRPM | HEART RATE: 86 BPM | SYSTOLIC BLOOD PRESSURE: 155 MMHG | TEMPERATURE: 97.6 F | WEIGHT: 152.34 LBS

## 2024-06-16 DIAGNOSIS — S82.54XA CLOSED NONDISPLACED FRACTURE OF MEDIAL MALLEOLUS OF RIGHT TIBIA, INITIAL ENCOUNTER: Primary | ICD-10-CM

## 2024-06-16 DIAGNOSIS — S82.831A CLOSED FRACTURE OF DISTAL END OF RIGHT FIBULA, UNSPECIFIED FRACTURE MORPHOLOGY, INITIAL ENCOUNTER: ICD-10-CM

## 2024-06-16 PROCEDURE — 29515 APPLICATION SHORT LEG SPLINT: CPT

## 2024-06-16 PROCEDURE — 73610 X-RAY EXAM OF ANKLE: CPT

## 2024-06-16 PROCEDURE — 99283 EMERGENCY DEPT VISIT LOW MDM: CPT

## 2024-06-16 PROCEDURE — 73630 X-RAY EXAM OF FOOT: CPT

## 2024-06-16 PROCEDURE — 73080 X-RAY EXAM OF ELBOW: CPT

## 2024-06-16 PROCEDURE — 6370000000 HC RX 637 (ALT 250 FOR IP): Performed by: EMERGENCY MEDICINE

## 2024-06-16 RX ORDER — AMLODIPINE BESYLATE 2.5 MG/1
2.5 TABLET ORAL DAILY
COMMUNITY

## 2024-06-16 RX ORDER — TRAMADOL HYDROCHLORIDE 50 MG/1
50 TABLET ORAL
Status: COMPLETED | OUTPATIENT
Start: 2024-06-16 | End: 2024-06-16

## 2024-06-16 RX ADMIN — TRAMADOL HYDROCHLORIDE 50 MG: 50 TABLET, COATED ORAL at 17:05

## 2024-06-16 ASSESSMENT — PAIN - FUNCTIONAL ASSESSMENT: PAIN_FUNCTIONAL_ASSESSMENT: 0-10

## 2024-06-16 ASSESSMENT — PAIN SCALES - GENERAL: PAINLEVEL_OUTOF10: 8

## 2024-06-16 ASSESSMENT — PAIN DESCRIPTION - LOCATION: LOCATION: FOOT

## 2024-06-16 ASSESSMENT — PAIN DESCRIPTION - ORIENTATION: ORIENTATION: RIGHT

## 2024-06-16 NOTE — ED PROVIDER NOTES
[facility]  EMERGENCY DEPARTMENT ENCOUNTER      Pt Name: Bee Early  MRN: [id number]  Birthdate 1940  Date of evaluation: 6/16/2024  Provider: Kalpesh Crow DO      HISTORY OF PRESENT ILLNESS      HPI    83-year-old female with a history of prior CVA with right-sided foot drop presents to the emergency department stating that she accidentally twisted her right foot and ankle while stepping walking with her walker on carpet and twisted her right ankle and foot.  She notes pain and swelling to her right foot and ankle.  She denies any history of prior fractures or surgeries to this area.  She also notes left elbow swelling from her prior fall few days earlier but notes full range of motion.  She denies any new numbness or weakness.  She takes tramadol at home for chronic pain and states that she has plenty of that at home.    Nursing Notes were reviewed.    REVIEW OF SYSTEMS         Review of Systems        PAST MEDICAL HISTORY     Past Medical History:   Diagnosis Date    GERD (gastroesophageal reflux disease)     Hiatal hernia     Hypertension     Nausea & vomiting     Unspecified adverse effect of anesthesia 9-2009    prolonged sedation    Upper respiratory infection 05/01/2024         SURGICAL HISTORY       Past Surgical History:   Procedure Laterality Date    APPENDECTOMY  child, open    CHOLECYSTECTOMY  2012    ORTHOPEDIC SURGERY  2009    lumbar bob.         CURRENT MEDICATIONS       Previous Medications    ACETAMINOPHEN (TYLENOL) 325 MG TABLET    Take by mouth every 6 hours as needed    AMLODIPINE (NORVASC) 2.5 MG TABLET    Take 1 tablet by mouth daily    ASPIRIN 81 MG CHEWABLE TABLET    Take 1 tablet by mouth daily    BUPROPION (WELLBUTRIN XL) 150 MG EXTENDED RELEASE TABLET    Take 1 tablet by mouth daily    CYANOCOBALAMIN 1000 MCG TABLET    Take 1 tablet by mouth daily    EVOLOCUMAB 140 MG/ML SOAJ    Inject into the skin every 14 days    LANSOPRAZOLE (PREVACID) 30 MG DELAYED RELEASE

## 2024-06-16 NOTE — ED NOTES
The patient left the Emergency Department via wheelchair, alert and oriented and in no acute distress. The patient was encouraged to call or return to the ED for worsening issues or problems and was encouraged to schedule a follow up appointment for continuing care. The patient verbalized understanding of discharge instructions and all questions were answered. The patient has no further concerns at this time.

## 2024-06-16 NOTE — ED TRIAGE NOTES
83 year old female presents to the ED after a fall at 1530 where she landed on and injured her right foot. Patient reports experiencing right sided weakness after a stroke she had years ago. Patient reports pain as a 7/10. Patient is A&Ox4.

## 2024-10-03 ENCOUNTER — HOSPITAL ENCOUNTER (EMERGENCY)
Facility: HOSPITAL | Age: 84
Discharge: HOME | End: 2024-10-03
Attending: EMERGENCY MEDICINE
Payer: MEDICARE

## 2024-10-03 ENCOUNTER — APPOINTMENT (OUTPATIENT)
Facility: HOSPITAL | Age: 84
End: 2024-10-03
Payer: MEDICARE

## 2024-10-03 VITALS
DIASTOLIC BLOOD PRESSURE: 63 MMHG | WEIGHT: 143 LBS | HEIGHT: 64 IN | OXYGEN SATURATION: 98 % | SYSTOLIC BLOOD PRESSURE: 151 MMHG | HEART RATE: 91 BPM | TEMPERATURE: 97.5 F | RESPIRATION RATE: 14 BRPM | BODY MASS INDEX: 24.41 KG/M2

## 2024-10-03 DIAGNOSIS — S61.411A SKIN TEAR OF RIGHT HAND WITHOUT COMPLICATION, INITIAL ENCOUNTER: Primary | ICD-10-CM

## 2024-10-03 PROCEDURE — 99283 EMERGENCY DEPT VISIT LOW MDM: CPT

## 2024-10-03 PROCEDURE — 73130 X-RAY EXAM OF HAND: CPT

## 2024-10-03 PROCEDURE — 12002 RPR S/N/AX/GEN/TRNK2.6-7.5CM: CPT

## 2024-10-03 PROCEDURE — 2500000003 HC RX 250 WO HCPCS

## 2024-10-03 RX ORDER — LIDOCAINE HYDROCHLORIDE AND EPINEPHRINE 10; 10 MG/ML; UG/ML
10 INJECTION, SOLUTION INFILTRATION; PERINEURAL
Status: COMPLETED | OUTPATIENT
Start: 2024-10-03 | End: 2024-10-03

## 2024-10-03 RX ORDER — CEPHALEXIN 500 MG/1
500 CAPSULE ORAL 3 TIMES DAILY
Qty: 21 CAPSULE | Refills: 0 | Status: SHIPPED | OUTPATIENT
Start: 2024-10-03 | End: 2024-10-10

## 2024-10-03 RX ADMIN — LIDOCAINE HYDROCHLORIDE,EPINEPHRINE BITARTRATE 10 ML: 10; .01 INJECTION, SOLUTION INFILTRATION; PERINEURAL at 15:23

## 2024-10-03 ASSESSMENT — PAIN - FUNCTIONAL ASSESSMENT: PAIN_FUNCTIONAL_ASSESSMENT: NONE - DENIES PAIN

## 2024-10-03 NOTE — DISCHARGE INSTRUCTIONS
You were seen and evaluated here today for a laceration.  You had 5 sutures placed.  We also applied a dressing to the wound.  For the first 24 hours, keep the area dry.  After that, it is okay for soap and water to run over the wound, just avoid scrubbing the area directly.  The dressing should fall off on its own after about 8 days.  You can medicate with bacitracin topically through this dressing.  Return to your primary care provider, local urgent care, or this emergency department for suture removal in 10 days.

## 2024-10-03 NOTE — ED TRIAGE NOTES
Patient reports balance issues for 2 years, going to the balance classes, had multiple fall due to losing her balance, uses cane and walker when remember. Reports reports fall when was trying to open her fridge around 1255pm, skin tear noted to the right posterior arm. No LOC, denies hitting head. Need Tdap. Patient is able to move her finger, full sensation.

## 2024-10-03 NOTE — ED PROVIDER NOTES
[facility], [place] EM[facility]GENCY [facility]  EMERGENCY DEPARTMENT ENCOUNTER      Pt Name: Bee Early  MRN: [id number]  Birthdate 1940  Date of evaluation: 10/3/2024  Provider: Dilip Murguia PA-C    CHIEF COMPLAINT       Chief Complaint   Patient presents with    Laceration         HISTORY OF PRESENT ILLNESS   (Location/Symptom, Timing/Onset, Context/Setting, Quality, Duration, Modifying Factors, Severity)  Note limiting factors.   84-year-old female with past medical history as below presents with complaint of fall causing hand laceration.  Patient reports ongoing balance issues for the past 2 years ever since she had a stroke leaving some residual right-sided weakness.  She also broke her right ankle/foot a few months ago.  She has been walking, however with a cane/walker.  She denies any new weakness.  Today around 1 PM, patient reports trying to open her fridge.  When she pulled the fridge open, she fell backwards.  She reports trying to brace herself with her right hand which hit into the table.  Denies hitting her head.  No loss of consciousness.  She is not currently on any blood thinners.  She is not sure when she had her last tetanus shot, she has a prescription needing updated Tdap.  Denies chest pain, shortness of breath, dizziness, or any other complaints prior to the fall.            Review of External Medical Records:     Nursing Notes were reviewed.    REVIEW OF SYSTEMS    (2-9 systems for level 4, 10 or more for level 5)     Review of Systems    Except as noted above the remainder of the review of systems was reviewed and negative.       PAST MEDICAL HISTORY     Past Medical History:   Diagnosis Date    GERD (gastroesophageal reflux disease)     Hiatal hernia     Hypertension     Nausea & vomiting     Unspecified adverse effect of anesthesia 9-2009    prolonged sedation    Upper respiratory infection 05/01/2024         SURGICAL HISTORY       Past Surgical History:   Procedure Laterality Date    APPENDECTOMY   child, open    CHOLECYSTECTOMY  2012    ORTHOPEDIC SURGERY  2009    lumbar bob.         CURRENT MEDICATIONS       Discharge Medication List as of 10/3/2024  4:05 PM        CONTINUE these medications which have NOT CHANGED    Details   rosuvastatin (CRESTOR) 10 MG tablet Take 1 tablet by mouth dailyHistorical Med      amLODIPine (NORVASC) 2.5 MG tablet Take 1 tablet by mouth dailyHistorical Med      acetaminophen (TYLENOL) 325 MG tablet Take by mouth every 6 hours as neededHistorical Med      aspirin 81 MG chewable tablet Take 1 tablet by mouth dailyHistorical Med      buPROPion (WELLBUTRIN XL) 150 MG extended release tablet Take 1 tablet by mouth dailyHistorical Med      cyanocobalamin 1000 MCG tablet Take 1 tablet by mouth dailyHistorical Med      lansoprazole (PREVACID) 30 MG delayed release capsule Take by mouth every morning (before breakfast)Historical Med      latanoprost (XALATAN) 0.005 % ophthalmic solution Apply 1 drop to eyeHistorical Med             ALLERGIES     Patient has no known allergies.    FAMILY HISTORY       Family History   Problem Relation Age of Onset    Heart Disease Mother     Cancer Mother     Hypertension Father     Hypertension Mother           SOCIAL HISTORY       Social History     Socioeconomic History    Marital status:    Tobacco Use    Smoking status: Former     Current packs/day: 0.00     Types: Cigarettes     Quit date: 2011     Years since quittin.8   Substance and Sexual Activity    Alcohol use: Never    Drug use: Never           PHYSICAL EXAM    (up to 7 for level 4, 8 or more for level 5)     ED Triage Vitals [10/03/24 1442]   BP Systolic BP Percentile Diastolic BP Percentile Temp Temp Source Pulse Respirations SpO2   (!) 177/120 -- -- 97.5 °F (36.4 °C) Tympanic 91 14 98 %      Height Weight - Scale         1.626 m (5' 4\") 64.9 kg (143 lb)             Body mass index is 24.55 kg/m².    Physical Exam  Vitals and nursing note reviewed.   Constitutional: